# Patient Record
Sex: FEMALE | Race: WHITE | NOT HISPANIC OR LATINO
[De-identification: names, ages, dates, MRNs, and addresses within clinical notes are randomized per-mention and may not be internally consistent; named-entity substitution may affect disease eponyms.]

---

## 2018-10-17 ENCOUNTER — TRANSCRIPTION ENCOUNTER (OUTPATIENT)
Age: 63
End: 2018-10-17

## 2019-12-09 ENCOUNTER — EMERGENCY (EMERGENCY)
Facility: HOSPITAL | Age: 64
LOS: 0 days | Discharge: HOME | End: 2019-12-09
Attending: EMERGENCY MEDICINE | Admitting: EMERGENCY MEDICINE
Payer: MEDICARE

## 2019-12-09 VITALS
OXYGEN SATURATION: 98 % | HEART RATE: 96 BPM | DIASTOLIC BLOOD PRESSURE: 67 MMHG | TEMPERATURE: 99 F | SYSTOLIC BLOOD PRESSURE: 156 MMHG | RESPIRATION RATE: 16 BRPM

## 2019-12-09 DIAGNOSIS — R10.9 UNSPECIFIED ABDOMINAL PAIN: ICD-10-CM

## 2019-12-09 DIAGNOSIS — F17.200 NICOTINE DEPENDENCE, UNSPECIFIED, UNCOMPLICATED: ICD-10-CM

## 2019-12-09 DIAGNOSIS — K57.92 DIVERTICULITIS OF INTESTINE, PART UNSPECIFIED, WITHOUT PERFORATION OR ABSCESS WITHOUT BLEEDING: ICD-10-CM

## 2019-12-09 LAB
ALBUMIN SERPL ELPH-MCNC: 4.2 G/DL — SIGNIFICANT CHANGE UP (ref 3.5–5.2)
ALP SERPL-CCNC: 59 U/L — SIGNIFICANT CHANGE UP (ref 30–115)
ALT FLD-CCNC: <5 U/L — SIGNIFICANT CHANGE UP (ref 0–41)
ANION GAP SERPL CALC-SCNC: 18 MMOL/L — HIGH (ref 7–14)
AST SERPL-CCNC: 19 U/L — SIGNIFICANT CHANGE UP (ref 0–41)
BASOPHILS # BLD AUTO: 0.11 K/UL — SIGNIFICANT CHANGE UP (ref 0–0.2)
BASOPHILS NFR BLD AUTO: 0.9 % — SIGNIFICANT CHANGE UP (ref 0–1)
BILIRUB SERPL-MCNC: 0.2 MG/DL — SIGNIFICANT CHANGE UP (ref 0.2–1.2)
BUN SERPL-MCNC: 9 MG/DL — LOW (ref 10–20)
CALCIUM SERPL-MCNC: 9.6 MG/DL — SIGNIFICANT CHANGE UP (ref 8.5–10.1)
CHLORIDE SERPL-SCNC: 104 MMOL/L — SIGNIFICANT CHANGE UP (ref 98–110)
CO2 SERPL-SCNC: 21 MMOL/L — SIGNIFICANT CHANGE UP (ref 17–32)
CREAT SERPL-MCNC: 0.9 MG/DL — SIGNIFICANT CHANGE UP (ref 0.7–1.5)
EOSINOPHIL # BLD AUTO: 0.25 K/UL — SIGNIFICANT CHANGE UP (ref 0–0.7)
EOSINOPHIL NFR BLD AUTO: 1.9 % — SIGNIFICANT CHANGE UP (ref 0–8)
GLUCOSE SERPL-MCNC: 114 MG/DL — HIGH (ref 70–99)
HCT VFR BLD CALC: 39.5 % — SIGNIFICANT CHANGE UP (ref 37–47)
HGB BLD-MCNC: 13.3 G/DL — SIGNIFICANT CHANGE UP (ref 12–16)
IMM GRANULOCYTES NFR BLD AUTO: 0.4 % — HIGH (ref 0.1–0.3)
LACTATE SERPL-SCNC: 0.9 MMOL/L — SIGNIFICANT CHANGE UP (ref 0.7–2)
LIDOCAIN IGE QN: 17 U/L — SIGNIFICANT CHANGE UP (ref 7–60)
LYMPHOCYTES # BLD AUTO: 1.99 K/UL — SIGNIFICANT CHANGE UP (ref 1.2–3.4)
LYMPHOCYTES # BLD AUTO: 15.5 % — LOW (ref 20.5–51.1)
MCHC RBC-ENTMCNC: 33.7 G/DL — SIGNIFICANT CHANGE UP (ref 32–37)
MCHC RBC-ENTMCNC: 33.8 PG — HIGH (ref 27–31)
MCV RBC AUTO: 100.5 FL — HIGH (ref 81–99)
MONOCYTES # BLD AUTO: 0.81 K/UL — HIGH (ref 0.1–0.6)
MONOCYTES NFR BLD AUTO: 6.3 % — SIGNIFICANT CHANGE UP (ref 1.7–9.3)
NEUTROPHILS # BLD AUTO: 9.65 K/UL — HIGH (ref 1.4–6.5)
NEUTROPHILS NFR BLD AUTO: 75 % — SIGNIFICANT CHANGE UP (ref 42.2–75.2)
NRBC # BLD: 0 /100 WBCS — SIGNIFICANT CHANGE UP (ref 0–0)
PLATELET # BLD AUTO: 560 K/UL — HIGH (ref 130–400)
POTASSIUM SERPL-MCNC: 4.2 MMOL/L — SIGNIFICANT CHANGE UP (ref 3.5–5)
POTASSIUM SERPL-SCNC: 4.2 MMOL/L — SIGNIFICANT CHANGE UP (ref 3.5–5)
PROT SERPL-MCNC: 6.3 G/DL — SIGNIFICANT CHANGE UP (ref 6–8)
RBC # BLD: 3.93 M/UL — LOW (ref 4.2–5.4)
RBC # FLD: 12.3 % — SIGNIFICANT CHANGE UP (ref 11.5–14.5)
SODIUM SERPL-SCNC: 143 MMOL/L — SIGNIFICANT CHANGE UP (ref 135–146)
WBC # BLD: 12.86 K/UL — HIGH (ref 4.8–10.8)
WBC # FLD AUTO: 12.86 K/UL — HIGH (ref 4.8–10.8)

## 2019-12-09 PROCEDURE — 99284 EMERGENCY DEPT VISIT MOD MDM: CPT

## 2019-12-09 PROCEDURE — 74177 CT ABD & PELVIS W/CONTRAST: CPT | Mod: 26

## 2019-12-09 RX ORDER — METRONIDAZOLE 500 MG
1 TABLET ORAL
Qty: 21 | Refills: 0
Start: 2019-12-09 | End: 2019-12-15

## 2019-12-09 RX ORDER — MOXIFLOXACIN HYDROCHLORIDE TABLETS, 400 MG 400 MG/1
1 TABLET, FILM COATED ORAL
Qty: 14 | Refills: 0
Start: 2019-12-09 | End: 2019-12-15

## 2019-12-09 NOTE — ED PROVIDER NOTE - CLINICAL SUMMARY MEDICAL DECISION MAKING FREE TEXT BOX
pt presented for left lower abd pain in settting of recent tx of diverticulitis. ct done exclude perforation of abscess formation. rectal exam unremarkable as per TANYA kelly. ct showing diverticulitis. pt advised GI f/u dc home

## 2019-12-09 NOTE — ED PROVIDER NOTE - PATIENT PORTAL LINK FT
You can access the FollowMyHealth Patient Portal offered by [facility] by registering at the following website: [email or web address]. By joining [facility]’s FollowMyHealth portal, you will also be able to view your health information using other applications (apps) compatible with our system.

## 2019-12-09 NOTE — ED PROVIDER NOTE - OBJECTIVE STATEMENT
Pt is a 64 year old female with PMH Diverticulitis, presents to ED with c/o abdominal pain. Pt states pain began x2 weeks ago Pt is a 64 year old female with PMH hypothyroidism, diverticulitis, presents to ED with c/o abdominal pain. Pt states pain began x2 weeks ago. Pain is sharp located to left lower quadrant. Pain is moderate, and constant. States she [place] [facility] [place] had CT done showing diverticulitis. States she has been on cipro/flagyl for 10 days finished her last course on Sat. Last colonoscopy 3 yo ago. Last flare up 2 yo and a half ago. States she felt relief from Wed-Sat but states pain is now worsening and is now having pain with defecation. No cp, sob, fever, chills, vomiting, diarrhea, back pain, urinary symptoms, headache, dizziness, paresthesias, or weakness.

## 2019-12-09 NOTE — ED ADULT NURSE NOTE - OBJECTIVE STATEMENT
patient alert and oriented x4, complaining of abdominal pain. states she was dx with diverticulitis and finished her abx on saturday. states she still has intermittent sharp abdominal pain. denies fever, nausea, vomiting

## 2019-12-09 NOTE — ED PROVIDER NOTE - CARE PROVIDER_API CALL
Troy Gibson)  Gastroenterology  [address] [place] [address]  Phone: [phone]  Fax: [phone]  Follow Up Time: 1-3 Days

## 2019-12-09 NOTE — ED PROVIDER NOTE - PROGRESS NOTE DETAILS
Attending Note: 65 y/o F presents for evaluation of LLQ abdominal pain and pain when going to the bathroom. Pt states she went to [facility] 2 weeks ago and had CT showing mild diverticulitis. Pt took ABX but is still having pain. Pt notes she feels bloated. No urinary symptoms, back pain, fevers or chills. PE: Well appearing, awake and alert. Cardio – S1S2. Resp - CTAB. Abdomen: Mild LLQ tenderness, soft, ND. Ext- no calf swelling. Neuro – grossly unremarkable. Impression: Repeat CT to ensure no progression of disease. I was directly involved in the management of this patient. Case was discussed with PA Medardo Carey.

## 2019-12-09 NOTE — ED PROVIDER NOTE - NS ED ROS FT
Review of Systems:  	•	CONSTITUTIONAL - no fever, no diaphoresis, no chills  	•	SKIN - no rash  	•	HEMATOLOGIC - no bleeding, no bruising  	•	EYES - no eye pain, no blurry vision  	•	ENT - no congestion  	•	RESPIRATORY - no shortness of breath, no cough  	•	CARDIAC - no chest pain, no palpitations  	•	GI - +pain with defecation, + abd pain, + nausea, no vomiting, no diarrhea, no constipation  	•	GENITO-URINARY - no dysuria; no hematuria, no increased urinary frequency  	•	MUSCULOSKELETAL - no joint paint, no swelling, no redness  	•	NEUROLOGIC - no weakness, no headache, no paresthesias, no LOC  	•	PSYCH - no anxiety, no depression  	All other ROS are negative except as documented in HPI.

## 2019-12-29 ENCOUNTER — INPATIENT (INPATIENT)
Facility: HOSPITAL | Age: 64
LOS: 4 days | Discharge: HOME HEALTH | End: 2020-01-03
Attending: INTERNAL MEDICINE | Admitting: INTERNAL MEDICINE
Payer: MEDICARE

## 2019-12-29 VITALS
RESPIRATION RATE: 18 BRPM | SYSTOLIC BLOOD PRESSURE: 143 MMHG | WEIGHT: 130.07 LBS | DIASTOLIC BLOOD PRESSURE: 73 MMHG | OXYGEN SATURATION: 98 % | TEMPERATURE: 98 F | HEART RATE: 89 BPM | HEIGHT: 65 IN

## 2019-12-29 DIAGNOSIS — Z98.51 TUBAL LIGATION STATUS: Chronic | ICD-10-CM

## 2019-12-29 LAB
ALBUMIN SERPL ELPH-MCNC: 4 G/DL — SIGNIFICANT CHANGE UP (ref 3.5–5.2)
ALP SERPL-CCNC: 66 U/L — SIGNIFICANT CHANGE UP (ref 30–115)
ALT FLD-CCNC: <5 U/L — SIGNIFICANT CHANGE UP (ref 0–41)
ANION GAP SERPL CALC-SCNC: 14 MMOL/L — SIGNIFICANT CHANGE UP (ref 7–14)
APPEARANCE UR: CLEAR — SIGNIFICANT CHANGE UP
AST SERPL-CCNC: 17 U/L — SIGNIFICANT CHANGE UP (ref 0–41)
BACTERIA # UR AUTO: NEGATIVE — SIGNIFICANT CHANGE UP
BASOPHILS # BLD AUTO: 0.07 K/UL — SIGNIFICANT CHANGE UP (ref 0–0.2)
BASOPHILS NFR BLD AUTO: 0.5 % — SIGNIFICANT CHANGE UP (ref 0–1)
BILIRUB SERPL-MCNC: 0.4 MG/DL — SIGNIFICANT CHANGE UP (ref 0.2–1.2)
BILIRUB UR-MCNC: NEGATIVE — SIGNIFICANT CHANGE UP
BUN SERPL-MCNC: 10 MG/DL — SIGNIFICANT CHANGE UP (ref 10–20)
CALCIUM SERPL-MCNC: 9.7 MG/DL — SIGNIFICANT CHANGE UP (ref 8.5–10.1)
CHLORIDE SERPL-SCNC: 102 MMOL/L — SIGNIFICANT CHANGE UP (ref 98–110)
CO2 SERPL-SCNC: 23 MMOL/L — SIGNIFICANT CHANGE UP (ref 17–32)
COLOR SPEC: SIGNIFICANT CHANGE UP
CREAT SERPL-MCNC: 0.7 MG/DL — SIGNIFICANT CHANGE UP (ref 0.7–1.5)
DIFF PNL FLD: NEGATIVE — SIGNIFICANT CHANGE UP
EOSINOPHIL # BLD AUTO: 0.33 K/UL — SIGNIFICANT CHANGE UP (ref 0–0.7)
EOSINOPHIL NFR BLD AUTO: 2.4 % — SIGNIFICANT CHANGE UP (ref 0–8)
EPI CELLS # UR: 4 /HPF — SIGNIFICANT CHANGE UP (ref 0–5)
GLUCOSE SERPL-MCNC: 88 MG/DL — SIGNIFICANT CHANGE UP (ref 70–99)
GLUCOSE UR QL: NEGATIVE — SIGNIFICANT CHANGE UP
HCT VFR BLD CALC: 40.1 % — SIGNIFICANT CHANGE UP (ref 37–47)
HGB BLD-MCNC: 13.5 G/DL — SIGNIFICANT CHANGE UP (ref 12–16)
HYALINE CASTS # UR AUTO: 1 /LPF — SIGNIFICANT CHANGE UP (ref 0–7)
IMM GRANULOCYTES NFR BLD AUTO: 0.4 % — HIGH (ref 0.1–0.3)
KETONES UR-MCNC: NEGATIVE — SIGNIFICANT CHANGE UP
LACTATE SERPL-SCNC: 1.2 MMOL/L — SIGNIFICANT CHANGE UP (ref 0.7–2)
LEUKOCYTE ESTERASE UR-ACNC: ABNORMAL
LIDOCAIN IGE QN: 17 U/L — SIGNIFICANT CHANGE UP (ref 7–60)
LYMPHOCYTES # BLD AUTO: 17.6 % — LOW (ref 20.5–51.1)
LYMPHOCYTES # BLD AUTO: 2.42 K/UL — SIGNIFICANT CHANGE UP (ref 1.2–3.4)
MCHC RBC-ENTMCNC: 33.7 G/DL — SIGNIFICANT CHANGE UP (ref 32–37)
MCHC RBC-ENTMCNC: 33.7 PG — HIGH (ref 27–31)
MCV RBC AUTO: 100 FL — HIGH (ref 81–99)
MONOCYTES # BLD AUTO: 0.72 K/UL — HIGH (ref 0.1–0.6)
MONOCYTES NFR BLD AUTO: 5.2 % — SIGNIFICANT CHANGE UP (ref 1.7–9.3)
NEUTROPHILS # BLD AUTO: 10.16 K/UL — HIGH (ref 1.4–6.5)
NEUTROPHILS NFR BLD AUTO: 73.9 % — SIGNIFICANT CHANGE UP (ref 42.2–75.2)
NITRITE UR-MCNC: NEGATIVE — SIGNIFICANT CHANGE UP
NRBC # BLD: 0 /100 WBCS — SIGNIFICANT CHANGE UP (ref 0–0)
PH UR: 6.5 — SIGNIFICANT CHANGE UP (ref 5–8)
PLATELET # BLD AUTO: 345 K/UL — SIGNIFICANT CHANGE UP (ref 130–400)
POTASSIUM SERPL-MCNC: 4.7 MMOL/L — SIGNIFICANT CHANGE UP (ref 3.5–5)
POTASSIUM SERPL-SCNC: 4.7 MMOL/L — SIGNIFICANT CHANGE UP (ref 3.5–5)
PROT SERPL-MCNC: 6.5 G/DL — SIGNIFICANT CHANGE UP (ref 6–8)
PROT UR-MCNC: NEGATIVE — SIGNIFICANT CHANGE UP
RBC # BLD: 4.01 M/UL — LOW (ref 4.2–5.4)
RBC # FLD: 12.5 % — SIGNIFICANT CHANGE UP (ref 11.5–14.5)
RBC CASTS # UR COMP ASSIST: 1 /HPF — SIGNIFICANT CHANGE UP (ref 0–4)
SODIUM SERPL-SCNC: 139 MMOL/L — SIGNIFICANT CHANGE UP (ref 135–146)
SP GR SPEC: 1.01 — SIGNIFICANT CHANGE UP (ref 1.01–1.02)
UROBILINOGEN FLD QL: SIGNIFICANT CHANGE UP
WBC # BLD: 13.76 K/UL — HIGH (ref 4.8–10.8)
WBC # FLD AUTO: 13.76 K/UL — HIGH (ref 4.8–10.8)
WBC UR QL: 13 /HPF — HIGH (ref 0–5)

## 2019-12-29 PROCEDURE — 99285 EMERGENCY DEPT VISIT HI MDM: CPT

## 2019-12-29 PROCEDURE — 74177 CT ABD & PELVIS W/CONTRAST: CPT | Mod: 26

## 2019-12-29 RX ORDER — ENOXAPARIN SODIUM 100 MG/ML
40 INJECTION SUBCUTANEOUS DAILY
Refills: 0 | Status: DISCONTINUED | OUTPATIENT
Start: 2019-12-29 | End: 2020-01-03

## 2019-12-29 RX ORDER — LEVOTHYROXINE SODIUM 125 MCG
25 TABLET ORAL DAILY
Refills: 0 | Status: DISCONTINUED | OUTPATIENT
Start: 2019-12-29 | End: 2020-01-03

## 2019-12-29 RX ORDER — SODIUM CHLORIDE 9 MG/ML
1000 INJECTION, SOLUTION INTRAVENOUS
Refills: 0 | Status: DISCONTINUED | OUTPATIENT
Start: 2019-12-29 | End: 2020-01-01

## 2019-12-29 RX ORDER — PIPERACILLIN AND TAZOBACTAM 4; .5 G/20ML; G/20ML
3.38 INJECTION, POWDER, LYOPHILIZED, FOR SOLUTION INTRAVENOUS ONCE
Refills: 0 | Status: COMPLETED | OUTPATIENT
Start: 2019-12-29 | End: 2019-12-29

## 2019-12-29 RX ORDER — SODIUM CHLORIDE 9 MG/ML
1000 INJECTION, SOLUTION INTRAVENOUS ONCE
Refills: 0 | Status: COMPLETED | OUTPATIENT
Start: 2019-12-29 | End: 2019-12-29

## 2019-12-29 RX ORDER — PIPERACILLIN AND TAZOBACTAM 4; .5 G/20ML; G/20ML
3.38 INJECTION, POWDER, LYOPHILIZED, FOR SOLUTION INTRAVENOUS EVERY 8 HOURS
Refills: 0 | Status: DISCONTINUED | OUTPATIENT
Start: 2019-12-29 | End: 2020-01-02

## 2019-12-29 RX ORDER — CHLORHEXIDINE GLUCONATE 213 G/1000ML
1 SOLUTION TOPICAL
Refills: 0 | Status: DISCONTINUED | OUTPATIENT
Start: 2019-12-29 | End: 2020-01-03

## 2019-12-29 RX ADMIN — SODIUM CHLORIDE 1000 MILLILITER(S): 9 INJECTION, SOLUTION INTRAVENOUS at 16:55

## 2019-12-29 RX ADMIN — PIPERACILLIN AND TAZOBACTAM 200 GRAM(S): 4; .5 INJECTION, POWDER, LYOPHILIZED, FOR SOLUTION INTRAVENOUS at 21:10

## 2019-12-29 NOTE — H&P ADULT - ASSESSMENT
# Diverticulitis  - c/w zosyn  - IV hydration  - GI consult for Dr. Gibson  - Bowel rest for 24 hrs  - If no symptomatic improvement may need surgical eval    # Pancreatic calcifications  - patient reports these where also seen on past CT scan 10 years ago  - no evidence of acute pancreatitis    # Hypothyroidism  - c/w synthroid    #DVT ppx: lovenox  #Diet: NPO  #Activity: ambulate w/ assistance

## 2019-12-29 NOTE — ED ADULT TRIAGE NOTE - CHIEF COMPLAINT QUOTE
Pt seen in ED 12/9 weeks ago for diverticulitis. Pt followed up with GI and was switched from cipro to augmentin by GI for 10 days which completed on 12/22. Pt reports increased LLQ pain since last night with painful BM. Pt denies N/V/D.

## 2019-12-29 NOTE — H&P ADULT - NSHPPHYSICALEXAM_GEN_ALL_CORE
PHYSICAL EXAM:  GENERAL: NAD, well-developed  HEAD:  Atraumatic, Normocephalic  CHEST/LUNG: Clear to auscultation bilaterally; No wheeze, ronchi or rales  HEART: Regular rate and rhythm; No murmurs, rubs, or gallops  ABDOMEN: tender to deep palpation of left lower quadrant  EXTREMITIES:  2+ Peripheral Pulses, No clubbing, cyanosis, or edema  PSYCH: AAOx3  NEUROLOGY: non-focal  SKIN: No rashes or lesions

## 2019-12-29 NOTE — H&P ADULT - NSHPLABSRESULTS_GEN_ALL_CORE
13.5   13.76 )-----------( 345      ( 29 Dec 2019 16:45 )             40.1           139  |  102  |  10  ----------------------------<  88  4.7   |  23  |  0.7    Ca    9.7      29 Dec 2019 16:45    TPro  6.5  /  Alb  4.0  /  TBili  0.4  /  DBili  x   /  AST  17  /  ALT  <5  /  AlkPhos  66                Urinalysis Basic - ( 29 Dec 2019 16:45 )    Color: Light Yellow / Appearance: Clear / S.010 / pH: x  Gluc: x / Ketone: Negative  / Bili: Negative / Urobili: <2 mg/dL   Blood: x / Protein: Negative / Nitrite: Negative   Leuk Esterase: Moderate / RBC: 1 /HPF / WBC 13 /HPF   Sq Epi: x / Non Sq Epi: 4 /HPF / Bacteria: Negative            Lactate Trend   @ 16:45 Lactate:1.2             CAPILLARY BLOOD GLUCOSE        < from: CT Abdomen and Pelvis w/ IV Cont (19 @ 19:23) >    IMPRESSION:     Colonic diverticulosis with focal circumferential thickening of the sigmoid colon and pericolonic inflammatory changes. Findings consistent with uncomplicated diverticulitis, overall unchanged from CT abdomen and pelvis 2019.     < end of copied text >

## 2019-12-29 NOTE — H&P ADULT - HISTORY OF PRESENT ILLNESS
65 y/o F w/ PMHx of hypothyroidism and prior diverticulitis roughly 10 years ago presents for abdominal pain. History goes back to Nov 26th when patient first started to feel a left lower quadrant that gives a gassy/bloating sensation. She started oral cipro and flagyl with no improvement then subsequently went to urgent care where a CT scan shows diverticulitis and she continued with antibiotics. However there was no improvement and then patient came to [facility] ED where another CT scan showed persistent diverticulitis. Patient sent home on continued cipro and flagyl. Saw her gastroenterologist where she was then started on Augmentin on Dec 13th. Patient has continued to have to improvement in pain after finishing Augmentin. Now present to ED with persistent pain. Her pain is associated with pain with defecation. Denies any nausea, vomiting, fever, night sweats, blood per rectum.

## 2019-12-29 NOTE — ED PROVIDER NOTE - CLINICAL SUMMARY MEDICAL DECISION MAKING FREE TEXT BOX
Pt presented to ED for LLQ pain, consistent with hx of diverticulitis, that failed 3 outpt abx regiments. Pt states feels the same. Labs, advanced imaging obtained. PT with diverticulitis without perf or abscess. IV abx ordered. Will admit to inpatient management. Endorsed to medical team.

## 2019-12-29 NOTE — ED PROVIDER NOTE - OBJECTIVE STATEMENT
Pt is a 63 y/o female with hx of hypothyroidism, diverticulitis, s/p 2 rounds of abx for diverticulitis presents to ED for continual pain to LLQ for 2 weeks, worse since last night, moderate, crampy, worse with palpation. + nausea. No vomiting, diarrhea, black or bloody stool, urinary complaints, fever.

## 2019-12-29 NOTE — H&P ADULT - ATTENDING COMMENTS
65 y/o F w/ PMHx of hypothyroidism and prior diverticulitis roughly 10 years ago presents for abdominal pain. History goes back to Nov 26th when patient first started to feel a left lower quadrant that gives a gassy/bloating sensation. She started oral Cipro and flagyl with no improvement then subsequently went to urgent care where a CT scan shows diverticulitis and she continued with antibiotics. However there was no improvement and then patient came to [facility] ED where another CT scan showed persistent diverticulitis. Patient sent home on continued cipro and flagyl. Saw her gastroenterologist where she was then started on Augmentin on Dec 13th. Patient has continued to have to improvement in pain after finishing Augmentin. Now present to ED with persistent pain. Her pain is associated with pain with defecation. Denies any nausea, vomiting, fever, night sweats, blood per rectum.      REVIEW OF SYSTEMS: see cc/HPI  CONSTITUTIONAL: No weakness, fevers or chills  EYES/ENT: No visual changes;  No vertigo or throat pain   NECK: No pain or stiffness  RESPIRATORY: No cough, wheezing, hemoptysis; No shortness of breath  CARDIOVASCULAR: No chest pain or palpitations  GASTROINTESTINAL: (+) abdominal - LLQ pain. No nausea, vomiting, or hematemesis; No diarrhea or constipation. No melena or hematochezia.  GENITOURINARY: No dysuria, frequency or hematuria  NEUROLOGICAL: No numbness or weakness  SKIN: No itching, rashes    Physical Exam:  General: WN/WD NAD  Neurology: A&Ox3, nonfocal, follows commands  Eyes: PERRLA/ EOMI  ENT/Neck: Neck supple, trachea midline, No JVD  Respiratory: CTA B/L, No wheezing, rales, rhonchi  CV: Normal rate regular rhythm, S1S2, no murmurs, rubs or gallops  Abdominal: Soft, NT, ND +BS,   Extremities: No edema, + peripheral pulses  Skin: No Rashes, Hematoma, Ecchymosis  Incisions: n/a  Tubes: n/a 63 y/o F w/ PMHx of hypothyroidism and prior diverticulitis roughly 10 years ago presents for abdominal pain. History goes back to Nov 26th when patient first started to feel a left lower quadrant that gives a gassy/bloating sensation. She started oral Cipro and flagyl with no improvement then subsequently went to urgent care where a CT scan shows diverticulitis and she continued with antibiotics. However there was no improvement and then patient came to [facility] ED where another CT scan showed persistent diverticulitis. Patient sent home on continued cipro and flagyl. Saw her gastroenterologist where she was then started on Augmentin on Dec 13th. Patient has continued to have to improvement in pain after finishing Augmentin. Now present to ED with persistent pain. Her pain is associated with pain with defecation. Denies any nausea, vomiting, fever, night sweats, blood per rectum.      REVIEW OF SYSTEMS: see cc/HPI  CONSTITUTIONAL: No weakness, fevers or chills  EYES/ENT: No visual changes;  No vertigo or throat pain   NECK: No pain or stiffness  RESPIRATORY: No cough, wheezing, hemoptysis; No shortness of breath  CARDIOVASCULAR: No chest pain or palpitations  GASTROINTESTINAL: (+) abdominal - LLQ pain. No nausea, vomiting, or hematemesis; No diarrhea or constipation. No melena or hematochezia.  GENITOURINARY: No dysuria, frequency or hematuria  NEUROLOGICAL: No numbness or weakness  SKIN: No itching, rashes    Physical Exam:  General: WN/WD NAD  Neurology: A&Ox3, nonfocal, follows commands  Eyes: PERRLA/ EOMI  ENT/Neck: Neck supple, trachea midline, No JVD  Respiratory: CTA B/L, No wheezing, rales, rhonchi  CV: Normal rate regular rhythm, S1S2, no murmurs, rubs or gallops  Abdominal: Soft, (+) LLQ tenderness, ND +BS,   Extremities: No edema, + peripheral pulses  Skin: No Rashes, Hematoma, Ecchymosis  Incisions: n/a  Tubes: n/a    A/P   Acute diverticulitis w/ failure of outpatient Rx  -IV Abx   -check blood Cx and repeat WBC  -IV fluids and PPI  -NPO for now  -GI eval     Pancreatic calcification - chronic   -no intervention     Hypothyroidism  -c/w levothyroxine     DVT prophylaxis

## 2019-12-29 NOTE — H&P ADULT - NSHPSOCIALHISTORY_GEN_ALL_CORE
Active smoker since age 18 years - 1/2 PPD, also has extensive drinking history 2 drinks of wine daily - last drink one month ago - has not drank since abdominal pain started

## 2019-12-30 LAB
ALBUMIN SERPL ELPH-MCNC: 3.6 G/DL — SIGNIFICANT CHANGE UP (ref 3.5–5.2)
ALP SERPL-CCNC: 57 U/L — SIGNIFICANT CHANGE UP (ref 30–115)
ALT FLD-CCNC: <5 U/L — SIGNIFICANT CHANGE UP (ref 0–41)
ANION GAP SERPL CALC-SCNC: 12 MMOL/L — SIGNIFICANT CHANGE UP (ref 7–14)
AST SERPL-CCNC: 13 U/L — SIGNIFICANT CHANGE UP (ref 0–41)
BILIRUB SERPL-MCNC: 0.5 MG/DL — SIGNIFICANT CHANGE UP (ref 0.2–1.2)
BUN SERPL-MCNC: 8 MG/DL — LOW (ref 10–20)
CALCIUM SERPL-MCNC: 9.1 MG/DL — SIGNIFICANT CHANGE UP (ref 8.5–10.1)
CHLORIDE SERPL-SCNC: 105 MMOL/L — SIGNIFICANT CHANGE UP (ref 98–110)
CO2 SERPL-SCNC: 26 MMOL/L — SIGNIFICANT CHANGE UP (ref 17–32)
CREAT SERPL-MCNC: 0.8 MG/DL — SIGNIFICANT CHANGE UP (ref 0.7–1.5)
GLUCOSE SERPL-MCNC: 90 MG/DL — SIGNIFICANT CHANGE UP (ref 70–99)
MAGNESIUM SERPL-MCNC: 1.5 MG/DL — LOW (ref 1.8–2.4)
POTASSIUM SERPL-MCNC: 4.4 MMOL/L — SIGNIFICANT CHANGE UP (ref 3.5–5)
POTASSIUM SERPL-SCNC: 4.4 MMOL/L — SIGNIFICANT CHANGE UP (ref 3.5–5)
PROT SERPL-MCNC: 5.6 G/DL — LOW (ref 6–8)
SODIUM SERPL-SCNC: 143 MMOL/L — SIGNIFICANT CHANGE UP (ref 135–146)

## 2019-12-30 PROCEDURE — 99233 SBSQ HOSP IP/OBS HIGH 50: CPT

## 2019-12-30 RX ORDER — INFLUENZA VIRUS VACCINE 15; 15; 15; 15 UG/.5ML; UG/.5ML; UG/.5ML; UG/.5ML
0.5 SUSPENSION INTRAMUSCULAR ONCE
Refills: 0 | Status: DISCONTINUED | OUTPATIENT
Start: 2019-12-30 | End: 2020-01-03

## 2019-12-30 RX ADMIN — Medication 25 MICROGRAM(S): at 05:57

## 2019-12-30 RX ADMIN — CHLORHEXIDINE GLUCONATE 1 APPLICATION(S): 213 SOLUTION TOPICAL at 05:57

## 2019-12-30 RX ADMIN — PIPERACILLIN AND TAZOBACTAM 25 GRAM(S): 4; .5 INJECTION, POWDER, LYOPHILIZED, FOR SOLUTION INTRAVENOUS at 05:57

## 2019-12-30 RX ADMIN — PIPERACILLIN AND TAZOBACTAM 25 GRAM(S): 4; .5 INJECTION, POWDER, LYOPHILIZED, FOR SOLUTION INTRAVENOUS at 14:47

## 2019-12-30 NOTE — CONSULT NOTE ADULT - SUBJECTIVE AND OBJECTIVE BOX
Gastroenterology Consultation:    Patient is a 64y old  Female who presented with a chief complaint of persistent left lower abdominal pain    Admitted on: 12-29-19  HPI:  65 y/o F w/ PMHx of hypothyroidism and prior diverticulitis roughly 10 years ago presents for abdominal pain. History goes back to Nov 26th when patient first started to feel a left lower quadrant that gives a gassy/bloating sensation. She started oral cipro and flagyl with no improvement then subsequently went to urgent care where a CT scan shows diverticulitis and she continued with antibiotics. However there was no improvement and then patient came to [facility] ED where another CT scan showed persistent diverticulitis. Patient sent home on continued cipro and flagyl. Saw her gastroenterologist where she was then started on Augmentin on Dec 13th. Patient has continued to have to improvement in pain after finishing Augmentin. Now present to ED with persistent pain. Her pain is associated with pain with defecation. Denies any nausea, vomiting, fever, night sweats, blood per rectum. (29 Dec 2019 21:35)      Prior records Reviewed (Y/N): y  History obtained from person other than patient (Y/N): n    Prior EGD:  Prior Colonoscopy:      PAST MEDICAL & SURGICAL HISTORY:  Diverticulitis  Hypothyroidism  H/O tubal ligation      FAMILY HISTORY:  FH: myocardial infarction: mother  FH: diabetes mellitus: mother      Social History:  Tobacco:  Alcohol:  Drugs:    Home Medications:  Synthroid 25 mcg (0.025 mg) oral tablet: 1 tab(s) orally once a day (29 Dec 2019 22:46)    MEDICATIONS  (STANDING):  chlorhexidine 4% Liquid 1 Application(s) Topical <User Schedule>  enoxaparin Injectable 40 milliGRAM(s) SubCutaneous daily  lactated ringers. 1000 milliLiter(s) (75 mL/Hr) IV Continuous <Continuous>  levothyroxine 25 MICROGram(s) Oral daily  piperacillin/tazobactam IVPB.. 3.375 Gram(s) IV Intermittent every 8 hours    MEDICATIONS  (PRN):      Allergies  No Known Allergies      Review of Systems:   Constitutional:  No Fever, No Chills  ENT/Mouth:  No Hearing Changes,  No Difficulty Swallowing  Eyes:  No Eye Pain, No Vision Changes  Cardiovascular:  No Chest Pain, No Palpitations  Respiratory:  No Cough, No Dyspnea  Gastrointestinal:  As described in HPI  Musculoskeletal:  No Joint Swelling, No Back Pain  Skin:  No Skin Lesions, No Jaundice  Neuro:  No Syncope, No Dizziness  Heme/Lymph:  No Bruising, No Bleeding.          Physical Examination:  T(C): 36.4 (12-29-19 @ 23:41), Max: 36.9 (12-29-19 @ 13:58)  HR: 65 (12-29-19 @ 23:41) (65 - 89)  BP: 124/75 (12-29-19 @ 23:41) (124/75 - 143/75)  RR: 18 (12-29-19 @ 23:41) (18 - 18)  SpO2: 100% (12-29-19 @ 16:36) (98% - 100%)  Height (cm): 165.1 (12-29-19 @ 13:58)  Weight (kg): 59 (12-29-19 @ 13:58)      Constitutional: No acute distress.  Eyes:. Conjunctivae are clear, Sclera is non-icteric.  Ears Nose and Throat: The external ears are normal appearing,  Oral mucosa is pink and moist.  Respiratory:  No signs of respiratory distress. Lung sounds are clear bilaterally.  Cardiovascular:  S1 S2, Regular rate and rhythm.  GI: Abdomen is soft, symmetric, and non-tender without distention. There are no visible lesions or scars. Bowel sounds are present and normoactive in all four quadrants. No masses, hepatomegaly, or splenomegaly are noted.   Neuro: No Tremor, No involuntary movements  Skin: No rashes, No Jaundice.          Data: (reviewed by attending)                        13.5   13.76 )-----------( 345      ( 29 Dec 2019 16:45 )             40.1     Hgb Trend:  13.5  12-29-19 @ 16:45      12-29    139  |  102  |  10  ----------------------------<  88  4.7   |  23  |  0.7    Ca    9.7      29 Dec 2019 16:45    TPro  6.5  /  Alb  4.0  /  TBili  0.4  /  DBili  x   /  AST  17  /  ALT  <5  /  AlkPhos  66  12-29    Liver panel trend:  TBili 0.4   /   AST 17   /   ALT <5   /   AlkP 66   /   Tptn 6.5   /   Alb 4.0    /   DBili --      12-29              Radiology:(reviewed by attending)  CT Abdomen and Pelvis w/ IV Cont:   EXAM:  CT ABDOMEN AND PELVIS IC            PROCEDURE DATE:  12/29/2019            INTERPRETATION:  CLINICAL STATEMENT: Left lower quadrant abdominal pain.      TECHNIQUE: Contiguous axial CT images were obtained from the lower chest to the pubic symphysis following administration of 100cc Optiray 320 intravenous contrast.  Oral contrast was not administered.  Reformatted images in the coronal and sagittal planes were acquired.    COMPARISON: CT abdomen and pelvis 12/9/2019.      FINDINGS:    LOWER CHEST: Right middle lobe subsegmental atelectasis and bibasilar subsegmental atelectasis.    HEPATOBILIARY: Unremarkable.    SPLEEN: Unremarkable.    PANCREAS: Coarse calcifications of the pancreatic parenchyma with mild prominence of the pancreatic duct, consistent with sequelae of chronic pancreatitis.    ADRENAL GLANDS: Unremarkable.    KIDNEYS: Symmetric renal enhancement. No hydroureteronephrosis.    ABDOMINOPELVIC NODES: No adenopathy.    PELVIC ORGANS: Unremarkable.    PERITONEUM/MESENTERY/BOWEL: Colonic diverticulosis with focal circumferential thickening of the sigmoid colon and pericolonic inflammatory changes. No intraperitoneal free air or focal drainable collection. No bowel obstruction or ascites.    BONES/SOFT TISSUES: Multilevel degenerative changes of the spine. Mild degenerative changes of bilateral hip joints.    OTHER: Normal caliber aorta. Atherosclerotic vascular calcifications.      IMPRESSION:     Colonic diverticulosis with focal circumferential thickening of the sigmoid colon and pericolonic inflammatory changes. Findings consistent with uncomplicated diverticulitis, overall unchanged from CT abdomen and pelvis 12/9/2019.               TEENA CARROLL M.D., RESIDENT RADIOLOGIST  This document has been electronically signed.  BRADY CARNES M.D., ATTENDING RADIOLOGIST  This document has been electronically signed. Dec 29 2019  8:21PM             (12-29-19 @ 19:23)

## 2019-12-30 NOTE — PROGRESS NOTE ADULT - ASSESSMENT
65 y/o F w/ PMHx of hypothyroidism and prior diverticulitis roughly 10 years ago presents for abdominal pain. History goes back to Nov 26th when patient first started to feel a left lower quadrant that gives a gassy/bloating sensation. She started oral cipro and flagyl with no improvement then subsequently went to urgent care where a CT scan shows diverticulitis and she continued with antibiotics. However there was no improvement and then patient came to [facility] ED where another CT scan showed persistent diverticulitis. Patient sent home on continued cipro and flagyl. Saw her gastroenterologist where she was then started on Augmentin on Dec 13th. Patient has continued to have to improvement in pain after finishing Augmentin. Now present to ED with persistent pain. Her pain is associated with pain with defecation. Denies any nausea, vomiting, fever, night sweats, blood per rectum.      # Diverticulitis - failed po cipor/flagyl/augmentin   - c/w zosyn for now  - IV hydration  - GI consult for Dr. Gibson - will likely need surgical eval   - cont bowel rest for now      # Pancreatic calcifications  - patient reports these where also seen on past CT scan 10 years ago  - no evidence of acute pancreatitis    # Hypothyroidism  - c/w synthroid

## 2019-12-30 NOTE — PATIENT PROFILE ADULT - DO YOU FEEL UNSAFE AT SCHOOL?
SUBJECTIVE/OBJECTIVE:                           Murray Nicholson is a 61 year old male called for an initial visit for Medication Therapy Management.  He was referred to me from Txp team. Discharged from the [facility] for heart txp since 7/2/18.     Chief Complaint: Discuss blood sugars post txp.     Allergies/ADRs: Reviewed in Epic  Tobacco: No tobacco use  Alcohol: not currently using  Caffeine: 1 cups/day of coffee  Activity: been going out and about with his friends.   PMH: Reviewed in Epic    Medication Adherence/Access:  Patient uses pill box(es).  Patient takes medications 4 time(s) per day.   Per patient, misses medication 0 times per week.     Heart Transplant:  Current immunosuppressants include TAC 3mg mg qAM & 2.5mg qPM, Prednisone 20mg qAM & 15mg qPM and MMF 1500mg BID. Pt complains of soft/ loose stools.   Transplant date: 6/14/18  Estimated Creatinine Clearance: 13.2 mL/min (based on Cr of 6.29).  CMV prophylaxis: using Valcyte BIW Donor (+), Recipient (-), treat 6 months post tx   PCP prophylaxis: Bactrim 1/2 tab SS daily for 6 months post txp  Antifungal Prophylaxis: Nystatin until off steroids.   PPI use: Pantoprazole 40mg qAM  Current supplements for electrolyte replacement:  .  Tx Coordinator: Bob Ulloa MD: [place], Using Med Card: Yes  Recent Infections:  Concerns about a post OP infection. Pt is getting Vancomycin IV after Dialysis sessions.   Recent Hospitalizations: recent txp  Home BPs: July 3, 129/75, 95/59 PM, 7/4 129/73, 7/5 133/74, 7/6 152/81.    Diabetes:  Pt currently taking NPH  28 units qAM, 14 units qevening.  Increased NPH last night.  For adjusting your NPH use the following guidelines:    If your fasting glucose is less than 100, please reduce your night time NPH by 2-4 units    If your pre-dinner glucose is less than 100, please reduce your morning NPH by 2-4 units    If your fasting glucose is  greater than 200  for 2 or more days, please increase your night time NPH by 2-4 units  If  your pre-dinner glucose is greater than 200 for 2 or more days, please increase your morning NPH by 2-4 units Pt is not experiencing side effects.  SMBG: two times daily.   Ranges (patient reported):   Date FBG/ 2hours post Dinner /2hours post   7/6 (increased NPH) 227    7/5 244 312   7/4 195 306   7/3 152 199   7/2 Discharged from hospital.  173     Patient is not experiencing hypoglycemia  Recent symptoms of high blood sugar? none  Diet/Exercise: Spinach, potato, rotisserie chicken, Carrots dinner. Low sodium    ESRD: Dialysis Tues, Thurs, Saturday.     Thrombus formed around dialysis catheter: Pt is taking Eliquis 2.5mg BID, Aspirin 81mg daily. Denies bleeds.     GOUT: Pt has not been taking Allopurinol.     Current labs include:  Today's Vitals: There were no vitals taken for this visit.  BP Readings from Last 3 Encounters:   07/05/18 148/81   07/02/18 91/51   04/16/18 110/75     Lab Results   Component Value Date    A1C 7.0 04/26/2018   .  Lab Results   Component Value Date    CHOL 152 04/16/2018     Lab Results   Component Value Date    TRIG 233 04/16/2018     Lab Results   Component Value Date    HDL 46 04/16/2018     Lab Results   Component Value Date    LDL 60 04/16/2018       Liver Function Studies -   Recent Labs   Lab Test  06/23/18   2126   PROTTOTAL  7.0   ALBUMIN  3.1*   BILITOTAL  0.6   ALKPHOS  136   AST  13   ALT  26       Lab Results   Component Value Date    UCRR 136 05/17/2017    MICROL 1160 05/19/2015    UMALCR 1266.38 (H) 05/19/2015       Last Basic Metabolic Panel:  Lab Results   Component Value Date     07/05/2018      Lab Results   Component Value Date    POTASSIUM 5.0 07/05/2018     Lab Results   Component Value Date    CHLORIDE 101 07/05/2018     Lab Results   Component Value Date    BUN 54 07/05/2018     Lab Results   Component Value Date    CR 6.29 07/05/2018     GFR Estimate   Date Value Ref Range Status   07/05/2018 9 (L) >60 mL/min/1.7m2 Final     Comment:     Non   American GFR Calc   07/02/2018 9 (L) >60 mL/min/1.7m2 Final     Comment:     Non  GFR Calc   07/01/2018 13 (L) >60 mL/min/1.7m2 Final     Comment:     Non  GFR Calc     GFR Estimate If Black   Date Value Ref Range Status   07/05/2018 11 (L) >60 mL/min/1.7m2 Final     Comment:      GFR Calc   07/02/2018 11 (L) >60 mL/min/1.7m2 Final     Comment:      GFR Calc   07/01/2018 16 (L) >60 mL/min/1.7m2 Final     Comment:      GFR Calc     Most Recent Immunizations   Administered Date(s) Administered     HEPA 02/09/2010     HepB 04/05/2016     Influenza (H1N1) 02/09/2010     Influenza (IIV3) PF 11/26/2012     Influenza Vaccine IM 3yrs+ 4 Valent IIV4 11/07/2017     Mantoux Tuberculin Skin Test 01/23/2018     Pneumo Conj 13-V (2010&after) 09/23/2015     Pneumococcal 23 valent 02/23/2011     TD (ADULT, 7+) 08/12/2004     TDAP Vaccine (Adacel) 02/28/2013     Zoster vaccine, live 04/09/2015       ASSESSMENT:                             Current medications were reviewed today.     Medication Adherence: good, no issues identified    Heart Transplant:  Stable. TAC level out of goal of 10-15.     Diabetes:  Stable. Pt has been appropriately increasing NPH. BG not at goal. Instructed to to increased ever 2-3 days until he is between 100-200 as directed. I will follow up in several weeks.     ESRD: Max dose of Hydralazine should be 300mg daily. Will discuss with txp team. Preferred dose in renal disease is 25-50mg.  For now recommend reduction down to TID.     Thrombus formed around dialysis catheter: Stable    GOUT: Pt has not been taking Allouprinol, will follow up with txp team about this.     PLAN:                            Txp Team...  1. Pt has not been taking Allopurinol. States he is out of refills. If you would like him to continue this medication, please send over additional refills.  2. TAC level lower than goal 10-15.   3. Max dose of  Hydralazine is 300mg, recommend reducing to 100mg q8 hours max.     Pt to..  1. Continue adjusting NPH as needed.     I spent 50 minutes with this patient today. I offer these suggestions for consideration by the txp team. A copy of the visit note was provided to the patient's txp care provider.    Will follow up in 3 weeks.     The patient was sent via [employer] a summary of these recommendations as an after visit summary.     Eduardo Lea, PharmD  [facility] Pharmacist    Phone: [phone]      no

## 2019-12-30 NOTE — CONSULT NOTE ADULT - SUBJECTIVE AND OBJECTIVE BOX
Chief Complaint: Patient is a 64y old  Female who presents with a chief complaint of lower   abdo pains about 1 month ago and over the past month hqs been treted with cipr0 , Flagyl over 	    HPI:  Pt started having lower abdo- pains about 1 month ago and treated with ci[ro , flagyl andhas not had much of a response in the last 3 weeks.  Sent to E     Medications:  chlorhexidine 4% Liquid 1 Application(s) Topical <User Schedule>  enoxaparin Injectable 40 milliGRAM(s) SubCutaneous daily  lactated ringers. 1000 milliLiter(s) IV Continuous <Continuous>  levothyroxine 25 MICROGram(s) Oral daily  piperacillin/tazobactam IVPB.. 3.375 Gram(s) IV Intermittent every 8 hours      PMHX/PSHX:  Diverticulitis  Hypothyroidism  H/O tubal ligation      Family history:  FH: myocardial infarction  FH: diabetes mellitus      Social History:     Allergies:  No Known Allergies        Review of Systems:  General:  No wt loss, fevers, chills, night sweats, fatigue or pruritis.  Eyes:  Good vision, no reported pain or redness.  ENT:  No sore throat, pain, runny nose, or difficulty swallowing  CV:  No pain, palpitations, hypo/hypertension  Resp:  No dyspnea, cough, tachypnea, wheezing  GI:  No pain, nausea, vomiting, dysphagia, heartburn, diarrhea, constipation, or weight loss. , No rectal bleeding, tarry stools, or hematemesis.  :  No pain, bleeding/discharges, incontinence, nocturia  Musculoskeletal:  No pain, weakness or fasciculations.  Neuro:  No weakness, tingling, memory problems or paresthesias  Psych:  No fatigue, insomnia, mood problems, depression  Endocrine:  No polyuria, polydipsia, cold/heat intolerance  Heme:  No petechiae, ecchymosis, easy bruisability  Skin:  No rash, pruritis, tattoos, scars, or edema      PHYSICAL EXAM:   Vital Signs:  Vital Signs Last 24 Hrs  T(C): 36.4 (30 Dec 2019 08:30), Max: 36.9 (29 Dec 2019 13:58)  T(F): 97.5 (30 Dec 2019 08:30), Max: 98.4 (29 Dec 2019 13:58)  HR: 74 (30 Dec 2019 08:30) (65 - 89)  BP: 148/78 (30 Dec 2019 08:30) (124/75 - 148/78)  BP(mean): --  RR: 16 (30 Dec 2019 08:30) (16 - 18)  SpO2: 97% (30 Dec 2019 08:30) (97% - 100%)  Daily Height in cm: 165.1 (29 Dec 2019 13:58)    Daily     T(C): 36.4 (12-30-19 @ 08:30), Max: 36.9 (12-29-19 @ 13:58)  HR: 74 (12-30-19 @ 08:30) (65 - 89)  BP: 148/78 (12-30-19 @ 08:30) (124/75 - 148/78)  RR: 16 (12-30-19 @ 08:30) (16 - 18)  SpO2: 97% (12-30-19 @ 08:30) (97% - 100%)    GENERAL:  Appears stated age, well-groomed, well-nourished, no distress  HEENT:  Conjunctivae clear and pink, no thyromegaly, nodules, adenopathy, no JVD, sclera -anicteric  CHEST:  Full & symmetric excursion, no increased effort, breath sounds clear  HEART:  Regular rhythm, S1, S2, no murmur/rub/S3/S4, no abdominal bruit, no edema  ABDOMEN:  Soft, mild tenderness in yhe hypogastric arear, non-distended, normoactive bowel sounds,  no masses ,no hepato-splenomegaly, no signs of chronic liver disease  EXTEREMITIES:  no cyanosis,clubbing or edema  SKIN:  No rash/erythema/ecchymoses/petechiae/wounds/abscess/warm/dry  NEURO:  Alert, oriented, no asterixis, no tremor, no encephalopathy    LABS:                        12.3   8.42  )-----------( 344      ( 30 Dec 2019 04:30 )             36.7     12-30    143  |  105  |  8<L>  ----------------------------<  90  4.4   |  26  |  0.8    Ca    9.1      30 Dec 2019 04:30  Mg     1.5     12-30    TPro  5.6<L>  /  Alb  3.6  /  TBili  0.5  /  DBili  x   /  AST  13  /  ALT  <5  /  AlkPhos  57  12-30    LIVER FUNCTIONS - ( 30 Dec 2019 04:30 )  Alb: 3.6 g/dL / Pro: 5.6 g/dL / ALK PHOS: 57 U/L / ALT: <5 U/L / AST: 13 U/L / GGT: x               Amylase Serum--      Lipase serum17       Ammonia--    GGT--        Imaging:      Assessment and Plan:  Mild diverticulitis - not respounsive to oral   Cipro and Flagyl    Plan  1.o Acute diverticullitis  2. NPO    ID consult

## 2019-12-31 LAB
HCV AB S/CO SERPL IA: 0.15 S/CO — SIGNIFICANT CHANGE UP (ref 0–0.99)
HCV AB SERPL-IMP: SIGNIFICANT CHANGE UP

## 2019-12-31 PROCEDURE — 99233 SBSQ HOSP IP/OBS HIGH 50: CPT

## 2019-12-31 RX ORDER — ACETAMINOPHEN 500 MG
650 TABLET ORAL EVERY 6 HOURS
Refills: 0 | Status: DISCONTINUED | OUTPATIENT
Start: 2019-12-31 | End: 2020-01-03

## 2019-12-31 RX ORDER — MAGNESIUM SULFATE 500 MG/ML
2 VIAL (ML) INJECTION ONCE
Refills: 0 | Status: COMPLETED | OUTPATIENT
Start: 2019-12-31 | End: 2019-12-31

## 2019-12-31 RX ADMIN — ENOXAPARIN SODIUM 40 MILLIGRAM(S): 100 INJECTION SUBCUTANEOUS at 13:23

## 2019-12-31 RX ADMIN — PIPERACILLIN AND TAZOBACTAM 25 GRAM(S): 4; .5 INJECTION, POWDER, LYOPHILIZED, FOR SOLUTION INTRAVENOUS at 13:23

## 2019-12-31 RX ADMIN — Medication 25 MICROGRAM(S): at 06:26

## 2019-12-31 RX ADMIN — PIPERACILLIN AND TAZOBACTAM 25 GRAM(S): 4; .5 INJECTION, POWDER, LYOPHILIZED, FOR SOLUTION INTRAVENOUS at 21:18

## 2019-12-31 RX ADMIN — PIPERACILLIN AND TAZOBACTAM 25 GRAM(S): 4; .5 INJECTION, POWDER, LYOPHILIZED, FOR SOLUTION INTRAVENOUS at 00:14

## 2019-12-31 RX ADMIN — Medication 50 GRAM(S): at 10:35

## 2019-12-31 RX ADMIN — PIPERACILLIN AND TAZOBACTAM 25 GRAM(S): 4; .5 INJECTION, POWDER, LYOPHILIZED, FOR SOLUTION INTRAVENOUS at 06:25

## 2019-12-31 RX ADMIN — CHLORHEXIDINE GLUCONATE 1 APPLICATION(S): 213 SOLUTION TOPICAL at 06:25

## 2019-12-31 NOTE — CONSULT NOTE ADULT - ASSESSMENT
ASSESSMENT  63 y/o F w/ PMHx of hypothyroidism and prior diverticulitis roughly 10 years ago presents for abdominal pain. History goes back to Nov 26th when patient first started to feel a left lower quadrant that gives a gassy/bloating sensation. She started oral cipro and flagyl with no improvement then subsequently went to urgent care where a CT scan shows diverticulitis and she continued with antibiotics. However there was no improvement and then patient came to [facility] ED where another CT scan showed persistent diverticulitis. Patient sent home on continued cipro and flagyl. Saw her gastroenterologist where she was then started on Augmentin on Dec 13th. Patient has continued to have to improvement in pain after finishing Augmentin. Now present to ED with persistent pain.    IMPRESSION  #Diverticulitis, persistent after PO cipro/flagyl and augmentin courses    CTAP Colonic diverticulosis with focal circumferential thickening of the sigmoid colon and pericolonic inflammatory changes. Findings consistent with uncomplicated diverticulitis, overall unchanged from CT abdomen and pelvis 12/9/2019.   #Right middle lobe subsegmental atelectasis and bibasilar subsegmental atelectasis.  #Sepsis ruled out on admission     RECOMMENDATIONS  This is an incomplete pended note, all final recommendations to follow.   - ESR/CRP    Spectra [id number] ASSESSMENT  65 y/o F w/ PMHx of hypothyroidism and prior diverticulitis roughly 10 years ago presents for abdominal pain. History goes back to Nov 26th when patient first started to feel a left lower quadrant that gives a gassy/bloating sensation. She started oral cipro and flagyl with no improvement then subsequently went to urgent care where a CT scan shows diverticulitis and she continued with antibiotics. However there was no improvement and then patient came to [facility] ED where another CT scan showed persistent diverticulitis. Patient sent home on continued cipro and flagyl. Saw her gastroenterologist where she was then started on Augmentin on Dec 13th. Patient has continued to have to improvement in pain after finishing Augmentin. Now present to ED with persistent pain.    IMPRESSION  #Diverticulitis, persistent after PO cipro/flagyl and augmentin courses    CTAP Colonic diverticulosis with focal circumferential thickening of the sigmoid colon and pericolonic inflammatory changes. Findings consistent with uncomplicated diverticulitis, overall unchanged from CT abdomen and pelvis 12/9/2019.   #Right middle lobe subsegmental atelectasis and bibasilar subsegmental atelectasis.  #Sepsis ruled out on admission     RECOMMENDATIONS  - ertapenem 1g q24h iv via midline x 14 days (end 1/11/20)  - ESR/CRP  - GI followup   - Weekly CBC, CMP  - ID follow-up with Emeli Moser or Thurs Dr. Mohsena Amin      [address] [facility]       [phone] (call to make an appointment, walk-ins Tuesdays 10:30 AM)      Fax [phone] spectra [phone]

## 2019-12-31 NOTE — CONSULT NOTE ADULT - SUBJECTIVE AND OBJECTIVE BOX
KIM WEISS  64y, Female  Allergy: No Known Allergies      CHIEF COMPLAINT: diverticulitis resistant to antibiotic rx (31 Dec 2019 10:04)      HPI:  63 y/o F w/ PMHx of hypothyroidism and prior diverticulitis roughly 10 years ago presents for abdominal pain. History goes back to  when patient first started to feel a left lower quadrant that gives a gassy/bloating sensation. She started oral cipro and flagyl with no improvement then subsequently went to urgent care where a CT scan shows diverticulitis and she continued with antibiotics. However there was no improvement and then patient came to [facility] ED where another CT scan showed persistent diverticulitis. Patient sent home on continued cipro and flagyl. Saw her gastroenterologist where she was then started on Augmentin on Dec 13th. Patient has continued to have to improvement in pain after finishing Augmentin. Now present to ED with persistent pain. Her pain is associated with pain with defecation. Denies any nausea, vomiting, fever, night sweats, blood per rectum. (29 Dec 2019 21:35)      PAST MEDICAL & SURGICAL HISTORY:  Diverticulitis  Hypothyroidism  H/O tubal ligation      FAMILY HISTORY  FH: myocardial infarction  FH: diabetes mellitus      SOCIAL HISTORY  Pt denies any current heavy ETOH use, IVDU. No recent travel outside the [place].       ROS  General: Denies rigors, nightsweats  HEENT: Denies headache, rhinorrhea, sore throat, eye pain  CV: Denies CP, palpitations  PULM: Denies wheezing, hemoptysis  GI: Denies hematemesis, hematochezia, melena  : Denies discharge, hematuria  MSK: Denies arthralgias, myalgias  SKIN: Denies rash, lesions  NEURO: Denies paresthesias, weakness  PSYCH: Denies depression, anxiety    VITALS:  T(F): 97.4, Max: 97.6 (19 @ 18:21)  HR: 65  BP: 138/85  RR: 20Vital Signs Last 24 Hrs  T(C): 36.3 (31 Dec 2019 07:30), Max: 36.4 (30 Dec 2019 18:21)  T(F): 97.4 (31 Dec 2019 07:30), Max: 97.6 (30 Dec 2019 18:21)  HR: 65 (30 Dec 2019 23:38) (59 - 65)  BP: 138/85 (31 Dec 2019 07:30) (130/58 - 143/67)  BP(mean): --  RR: 20 (31 Dec 2019 07:30) (18 - 20)  SpO2: 97% (30 Dec 2019 15:53) (97% - 97%)    PHYSICAL EXAM:  Gen: NAD, resting in bed  HEENT: Normocephalic, atraumatic  Neck: supple, no lymphadenopathy  CV: Regular rate & regular rhythm  Lungs: decreased BS at bases, no fremitus  Abdomen: Soft, BS present  Ext: Warm, well perfused  Neuro: non focal, awake  Skin: no rash, no erythema  Lines: no phlebitis    TESTS & MEASUREMENTS:                        12.3   8.42  )-----------( 344      ( 30 Dec 2019 04:30 )             36.7         143  |  105  |  8<L>  ----------------------------<  90  4.4   |  26  |  0.8    Ca    9.1      30 Dec 2019 04:30  Mg     1.5         TPro  5.6<L>  /  Alb  3.6  /  TBili  0.5  /  DBili  x   /  AST  13  /  ALT  <5  /  AlkPhos  57        LIVER FUNCTIONS - ( 30 Dec 2019 04:30 )  Alb: 3.6 g/dL / Pro: 5.6 g/dL / ALK PHOS: 57 U/L / ALT: <5 U/L / AST: 13 U/L / GGT: x           Urinalysis Basic - ( 29 Dec 2019 16:45 )    Color: Light Yellow / Appearance: Clear / S.010 / pH: x  Gluc: x / Ketone: Negative  / Bili: Negative / Urobili: <2 mg/dL   Blood: x / Protein: Negative / Nitrite: Negative   Leuk Esterase: Moderate / RBC: 1 /HPF / WBC 13 /HPF   Sq Epi: x / Non Sq Epi: 4 /HPF / Bacteria: Negative          Lactate, Blood: 1.2 mmol/L (19 @ 16:45)      INFECTIOUS DISEASES TESTING  Hepatitis C Virus Interpretation: Nonreact (19 @ 04:30)      RADIOLOGY & ADDITIONAL TESTS:  I have personally reviewed the last Chest xray  CXR      CT  CT Abdomen and Pelvis w/ IV Cont:   EXAM:  CT ABDOMEN AND PELVIS IC            PROCEDURE DATE:  2019            INTERPRETATION:  CLINICAL STATEMENT: Left lower quadrant abdominal pain.      TECHNIQUE: Contiguous axial CT images were obtained from the lower chest to the pubic symphysis following administration of 100cc Optiray 320 intravenous contrast.  Oral contrast was not administered.  Reformatted images in the coronal and sagittal planes were acquired.    COMPARISON: CT abdomen and pelvis 2019.      FINDINGS:    LOWER CHEST: Right middle lobe subsegmental atelectasis and bibasilar subsegmental atelectasis.    HEPATOBILIARY: Unremarkable.    SPLEEN: Unremarkable.    PANCREAS: Coarse calcifications of the pancreatic parenchyma with mild prominence of the pancreatic duct, consistent with sequelae of chronic pancreatitis.    ADRENAL GLANDS: Unremarkable.    KIDNEYS: Symmetric renal enhancement. No hydroureteronephrosis.    ABDOMINOPELVIC NODES: No adenopathy.    PELVIC ORGANS: Unremarkable.    PERITONEUM/MESENTERY/BOWEL: Colonic diverticulosis with focal circumferential thickening of the sigmoid colon and pericolonic inflammatory changes. No intraperitoneal free air or focal drainable collection. No bowel obstruction or ascites.    BONES/SOFT TISSUES: Multilevel degenerative changes of the spine. Mild degenerative changes of bilateral hip joints.    OTHER: Normal caliber aorta. Atherosclerotic vascular calcifications.      IMPRESSION:     Colonic diverticulosis with focal circumferential thickening of the sigmoid colon and pericolonic inflammatory changes. Findings consistent with uncomplicated diverticulitis, overall unchanged from CT abdomen and pelvis 2019.               TEENA CARROLL M.D., RESIDENT RADIOLOGIST  This document has been electronically signed.  BRADY CARNES M.D., ATTENDING RADIOLOGIST  This document has been electronically signed. Dec 29 2019  8:21PM             (19 @ 19:23)      CARDIOLOGY TESTING      MEDICATIONS  chlorhexidine 4% Liquid 1  enoxaparin Injectable 40  influenza   Vaccine 0.5  lactated ringers. 1000  levothyroxine 25  piperacillin/tazobactam IVPB.. 3.375      ANTIBIOTICS:  piperacillin/tazobactam IVPB.. 3.375 Gram(s) IV Intermittent every 8 hours      ALLERGIES:  No Known Allergies KIM WEISS  64y, Female  Allergy: No Known Allergies      CHIEF COMPLAINT: diverticulitis resistant to antibiotic rx (31 Dec 2019 10:04)      HPI:  63 y/o F w/ PMHx of hypothyroidism and prior diverticulitis roughly 10 years ago presents for abdominal pain. History goes back to  when patient first started to feel a left lower quadrant that gives a gassy/bloating sensation. She started oral cipro and flagyl with no improvement then subsequently went to urgent care where a CT scan shows diverticulitis and she continued with antibiotics. However there was no improvement and then patient came to [facility] ED where another CT scan showed persistent diverticulitis. Patient sent home on continued cipro and flagyl. Saw her gastroenterologist where she was then started on Augmentin on Dec 13th. Patient has continued to have to improvement in pain after finishing Augmentin. Now present to ED with persistent pain. Her pain is associated with pain with defecation. Denies any nausea, vomiting, fever, night sweats, blood per rectum. (29 Dec 2019 21:35)      PAST MEDICAL & SURGICAL HISTORY:  Diverticulitis  Hypothyroidism  H/O tubal ligation      FAMILY HISTORY  FH: myocardial infarction  FH: diabetes mellitus      SOCIAL HISTORY  Pt denies any current heavy ETOH use, IVDU. No recent travel outside the [place].       ROS  General: Denies rigors, nightsweats  HEENT: Denies headache, rhinorrhea, sore throat, eye pain  CV: Denies CP, palpitations  PULM: Denies wheezing, hemoptysis  GI: Denies hematemesis, hematochezia, melena  : Denies discharge, hematuria  MSK: Denies arthralgias, myalgias  SKIN: Denies rash, lesions  NEURO: Denies paresthesias, weakness  PSYCH: Denies depression, anxiety    VITALS:  T(F): 97.4, Max: 97.6 (19 @ 18:21)  HR: 65  BP: 138/85  RR: 20Vital Signs Last 24 Hrs  T(C): 36.3 (31 Dec 2019 07:30), Max: 36.4 (30 Dec 2019 18:21)  T(F): 97.4 (31 Dec 2019 07:30), Max: 97.6 (30 Dec 2019 18:21)  HR: 65 (30 Dec 2019 23:38) (59 - 65)  BP: 138/85 (31 Dec 2019 07:30) (130/58 - 143/67)  BP(mean): --  RR: 20 (31 Dec 2019 07:30) (18 - 20)  SpO2: 97% (30 Dec 2019 15:53) (97% - 97%)    PHYSICAL EXAM:  Gen: NAD, resting in bed  HEENT: Normocephalic, atraumatic  Neck: supple, no lymphadenopathy  CV: Regular rate & regular rhythm  Lungs: decreased BS at bases, no fremitus  Abdomen: Soft, BS present LLQ TTP  Ext: Warm, well perfused  Neuro: non focal, awake  Skin: no rash, no erythema  Lines: no phlebitis    TESTS & MEASUREMENTS:                        12.3   8.42  )-----------( 344      ( 30 Dec 2019 04:30 )             36.7         143  |  105  |  8<L>  ----------------------------<  90  4.4   |  26  |  0.8    Ca    9.1      30 Dec 2019 04:30  Mg     1.5         TPro  5.6<L>  /  Alb  3.6  /  TBili  0.5  /  DBili  x   /  AST  13  /  ALT  <5  /  AlkPhos  57        LIVER FUNCTIONS - ( 30 Dec 2019 04:30 )  Alb: 3.6 g/dL / Pro: 5.6 g/dL / ALK PHOS: 57 U/L / ALT: <5 U/L / AST: 13 U/L / GGT: x           Urinalysis Basic - ( 29 Dec 2019 16:45 )    Color: Light Yellow / Appearance: Clear / S.010 / pH: x  Gluc: x / Ketone: Negative  / Bili: Negative / Urobili: <2 mg/dL   Blood: x / Protein: Negative / Nitrite: Negative   Leuk Esterase: Moderate / RBC: 1 /HPF / WBC 13 /HPF   Sq Epi: x / Non Sq Epi: 4 /HPF / Bacteria: Negative          Lactate, Blood: 1.2 mmol/L (19 @ 16:45)      INFECTIOUS DISEASES TESTING  Hepatitis C Virus Interpretation: Nonreact (19 @ 04:30)      RADIOLOGY & ADDITIONAL TESTS:  I have personally reviewed the last Chest xray  CXR      CT  CT Abdomen and Pelvis w/ IV Cont:   EXAM:  CT ABDOMEN AND PELVIS IC            PROCEDURE DATE:  2019            INTERPRETATION:  CLINICAL STATEMENT: Left lower quadrant abdominal pain.      TECHNIQUE: Contiguous axial CT images were obtained from the lower chest to the pubic symphysis following administration of 100cc Optiray 320 intravenous contrast.  Oral contrast was not administered.  Reformatted images in the coronal and sagittal planes were acquired.    COMPARISON: CT abdomen and pelvis 2019.      FINDINGS:    LOWER CHEST: Right middle lobe subsegmental atelectasis and bibasilar subsegmental atelectasis.    HEPATOBILIARY: Unremarkable.    SPLEEN: Unremarkable.    PANCREAS: Coarse calcifications of the pancreatic parenchyma with mild prominence of the pancreatic duct, consistent with sequelae of chronic pancreatitis.    ADRENAL GLANDS: Unremarkable.    KIDNEYS: Symmetric renal enhancement. No hydroureteronephrosis.    ABDOMINOPELVIC NODES: No adenopathy.    PELVIC ORGANS: Unremarkable.    PERITONEUM/MESENTERY/BOWEL: Colonic diverticulosis with focal circumferential thickening of the sigmoid colon and pericolonic inflammatory changes. No intraperitoneal free air or focal drainable collection. No bowel obstruction or ascites.    BONES/SOFT TISSUES: Multilevel degenerative changes of the spine. Mild degenerative changes of bilateral hip joints.    OTHER: Normal caliber aorta. Atherosclerotic vascular calcifications.      IMPRESSION:     Colonic diverticulosis with focal circumferential thickening of the sigmoid colon and pericolonic inflammatory changes. Findings consistent with uncomplicated diverticulitis, overall unchanged from CT abdomen and pelvis 2019.               TEENA CARROLL M.D., RESIDENT RADIOLOGIST  This document has been electronically signed.  BRADY CARNES M.D., ATTENDING RADIOLOGIST  This document has been electronically signed. Dec 29 2019  8:21PM             (19 @ 19:23)      CARDIOLOGY TESTING      MEDICATIONS  chlorhexidine 4% Liquid 1  enoxaparin Injectable 40  influenza   Vaccine 0.5  lactated ringers. 1000  levothyroxine 25  piperacillin/tazobactam IVPB.. 3.375      ANTIBIOTICS:  piperacillin/tazobactam IVPB.. 3.375 Gram(s) IV Intermittent every 8 hours      ALLERGIES:  No Known Allergies

## 2019-12-31 NOTE — PROGRESS NOTE ADULT - ASSESSMENT
63 y/o F w/ PMHx of hypothyroidism and prior diverticulitis roughly 10 years ago presents for abdominal pain. History goes back to Nov 26th when patient first started to feel a left lower quadrant that gives a gassy/bloating sensation. She started oral cipro and flagyl with no improvement then subsequently went to urgent care where a CT scan shows diverticulitis and she continued with antibiotics. However there was no improvement and then patient came to [facility] ED where another CT scan showed persistent diverticulitis. Patient sent home on continued cipro and flagyl. Saw her gastroenterologist where she was then started on Augmentin on Dec 13th. Patient has continued to have to improvement in pain after finishing Augmentin. Now present to ED with persistent pain. Her pain is associated with pain with defecation. Denies any nausea, vomiting, fever, night sweats, blood per rectum.  FEELING BETTER TODAY     # Diverticulitis - failed po cipor/flagyl/augmentin   - c/w zosyn for now  - IV hydration  - GI consult recc reviewed    - clears for now   - ID cx     # hypomagnesemia  - replacing via IV 2 g       # Pancreatic calcifications  - patient reports these where also seen on past CT scan 10 years ago  - no evidence of acute pancreatitis    # Hypothyroidism  - c/w synthroid    D/W DR. CHERY

## 2019-12-31 NOTE — PROGRESS NOTE ADULT - ASSESSMENT
IMP:  Diverticulitis seems to be improving    PLAN:  start clear fluids and increase slowly as toleerated

## 2020-01-01 PROCEDURE — 99233 SBSQ HOSP IP/OBS HIGH 50: CPT

## 2020-01-01 RX ADMIN — PIPERACILLIN AND TAZOBACTAM 25 GRAM(S): 4; .5 INJECTION, POWDER, LYOPHILIZED, FOR SOLUTION INTRAVENOUS at 05:38

## 2020-01-01 RX ADMIN — PIPERACILLIN AND TAZOBACTAM 25 GRAM(S): 4; .5 INJECTION, POWDER, LYOPHILIZED, FOR SOLUTION INTRAVENOUS at 21:30

## 2020-01-01 RX ADMIN — PIPERACILLIN AND TAZOBACTAM 25 GRAM(S): 4; .5 INJECTION, POWDER, LYOPHILIZED, FOR SOLUTION INTRAVENOUS at 13:16

## 2020-01-01 RX ADMIN — Medication 25 MICROGRAM(S): at 05:38

## 2020-01-01 NOTE — PROGRESS NOTE ADULT - ASSESSMENT
63 y/o F w/ PMHx of hypothyroidism and prior diverticulitis roughly 10 years ago presents for abdominal pain. History goes back to Nov 26th when patient first started to feel a left lower quadrant that gives a gassy/bloating sensation. She started oral cipro and flagyl with no improvement then subsequently went to urgent care where a CT scan shows diverticulitis and she continued with antibiotics. However there was no improvement and then patient came to [facility] ED where another CT scan showed persistent diverticulitis. Patient sent home on continued cipro and flagyl. Saw her gastroenterologist where she was then started on Augmentin on Dec 13th. Patient has continued to have to improvement in pain after finishing Augmentin. Now present to ED with persistent pain. Her pain is associated with pain with defecation. Denies any nausea, vomiting, fever, night sweats, blood per rectum.      # Diverticulitis - failed po cipor/flagyl/augmentin   - awaiting surgery eval   -midline to be placed in am and ertapenum upon dc  -gi follow up   -op id follow up  - advance diet to gi soft     # Pancreatic calcifications    # Hypothyroidism  - c/w synthroid    #Tobacco abuse counseled     #Atelectasis deep breathing     #CKD 2     #H/o 3mm left lower lobe granule    Progress Note Handoff    Pending:  anticipate dc in am     Family discussion: patient is of sound mind and agrees to plan of care    Disposition: Home___

## 2020-01-02 ENCOUNTER — TRANSCRIPTION ENCOUNTER (OUTPATIENT)
Age: 65
End: 2020-01-02

## 2020-01-02 PROCEDURE — 99239 HOSP IP/OBS DSCHRG MGMT >30: CPT

## 2020-01-02 PROCEDURE — 99221 1ST HOSP IP/OBS SF/LOW 40: CPT

## 2020-01-02 RX ORDER — ERTAPENEM SODIUM 1 G/1
1000 INJECTION, POWDER, LYOPHILIZED, FOR SOLUTION INTRAMUSCULAR; INTRAVENOUS EVERY 24 HOURS
Refills: 0 | Status: DISCONTINUED | OUTPATIENT
Start: 2020-01-02 | End: 2020-01-03

## 2020-01-02 RX ORDER — PIPERACILLIN AND TAZOBACTAM 4; .5 G/20ML; G/20ML
1 INJECTION, POWDER, LYOPHILIZED, FOR SOLUTION INTRAVENOUS
Qty: 0 | Refills: 0 | DISCHARGE
Start: 2020-01-02

## 2020-01-02 RX ORDER — ERTAPENEM SODIUM 1 G/1
1 INJECTION, POWDER, LYOPHILIZED, FOR SOLUTION INTRAMUSCULAR; INTRAVENOUS
Qty: 0 | Refills: 0 | DISCHARGE
Start: 2020-01-02

## 2020-01-02 RX ADMIN — ERTAPENEM SODIUM 120 MILLIGRAM(S): 1 INJECTION, POWDER, LYOPHILIZED, FOR SOLUTION INTRAMUSCULAR; INTRAVENOUS at 16:31

## 2020-01-02 RX ADMIN — Medication 25 MICROGRAM(S): at 06:20

## 2020-01-02 RX ADMIN — PIPERACILLIN AND TAZOBACTAM 25 GRAM(S): 4; .5 INJECTION, POWDER, LYOPHILIZED, FOR SOLUTION INTRAVENOUS at 13:36

## 2020-01-02 RX ADMIN — PIPERACILLIN AND TAZOBACTAM 25 GRAM(S): 4; .5 INJECTION, POWDER, LYOPHILIZED, FOR SOLUTION INTRAVENOUS at 06:18

## 2020-01-02 NOTE — DISCHARGE NOTE NURSING/CASE MANAGEMENT/SOCIAL WORK - NSDCFUADDAPPT_GEN_ALL_CORE_FT
ID follow-up with Emeli Moser or Thurs Dr. Mohsena Amin      [address] [facility]       [phone] (call to make an appointment, walk-ins Tuesdays 10:30 AM)      Fax [phone]

## 2020-01-02 NOTE — CONSULT NOTE ADULT - SUBJECTIVE AND OBJECTIVE BOX
KIM WEISS [id number]  64y Female    HPI:  63 y/o F w/ PMHx of hypothyroidism and prior diverticulitis roughly 10 years ago presents for abdominal pain. History goes back to Nov 26th when patient first started to feel a left lower quadrant that gives a gassy/bloating sensation. She started oral cipro and flagyl with no improvement then subsequently went to urgent care where a CT scan shows diverticulitis and she continued with antibiotics. However there was no improvement and then patient came to [facility] ED where another CT scan showed persistent diverticulitis. Patient sent home on continued cipro and flagyl. Saw her gastroenterologist where she was then started on Augmentin on Dec 13th. Patient has continued to have to improvement in pain after finishing Augmentin. Now present to ED with persistent pain. Her pain is associated with pain with defecation. Denies any nausea, vomiting, fever, night sweats, blood per rectum. (29 Dec 2019 21:35)    Surgery consulted to discuss possible surgical intervention. Per the patient she first started feeling LLQ pain on 11/25, started herself on a 10d course of oral cipro/flagyl given to her by her physician during a previous encounter. She completed her 10d course, however d/t persistent pain, presented to the ED for evaluation on 12/9, was scanned with CT AP findings of focal area of sigmoid wall thickening with adjacent inflammatory fat stranding  consistent with diverticulitis, and was discharged on 10d cipro/flagyl. She went to her GI doctor Prince for evaluation on 12/13 and was started on 10d of augmentin. D/t persistent pain, she then re-presented to the ED for evaluation w/ CT AP findings of colonic diverticulosis with focal circumferential thickening of the sigmoid colon and pericolonic inflammatory changes, globally unchanged from previous CT AP. She was admitted to Medicine for IV zosyn, with ID recommendation for IV ertapenem for 10d        PAST MEDICAL & SURGICAL HISTORY:  Diverticulitis  Hypothyroidism  H/O tubal ligation        MEDICATIONS  (STANDING):  chlorhexidine 4% Liquid 1 Application(s) Topical <User Schedule>  enoxaparin Injectable 40 milliGRAM(s) SubCutaneous daily  influenza   Vaccine 0.5 milliLiter(s) IntraMuscular once  levothyroxine 25 MICROGram(s) Oral daily  piperacillin/tazobactam IVPB.. 3.375 Gram(s) IV Intermittent every 8 hours    MEDICATIONS  (PRN):  acetaminophen   Tablet .. 650 milliGRAM(s) Oral every 6 hours PRN Temp greater or equal to 38C (100.4F), Mild Pain (1 - 3)      Allergies    No Known Allergies    Intolerances        REVIEW OF SYSTEMS    [ ] A ten-point review of systems was otherwise negative except as noted.  [ ] Due to altered mental status/intubation, subjective information were not able to be obtained from the patient. History was obtained, to the extent possible, from review of the chart and collateral sources of information.      Vital Signs Last 24 Hrs  T(C): 36.5 (02 Jan 2020 07:30), Max: 36.5 (02 Jan 2020 07:30)  T(F): 97.7 (02 Jan 2020 07:30), Max: 97.7 (02 Jan 2020 07:30)  HR: 58 (02 Jan 2020 07:30) (53 - 58)  BP: 129/65 (02 Jan 2020 07:30) (129/65 - 160/56)  BP(mean): --  RR: 17 (02 Jan 2020 07:30) (17 - 18)  SpO2: --    PHYSICAL EXAM:  GENERAL: NAD, well-appearing  CHEST/LUNG: Clear to auscultation bilaterally  HEART: Regular rate and rhythm  ABDOMEN: Soft, Nontender, Nondistended;   EXTREMITIES:  No clubbing, cyanosis, or edema      LABS:  Labs:  CAPILLARY BLOOD GLUCOSE                      LFTs:         Coags:                    RADIOLOGY & ADDITIONAL STUDIES: KIM WEISS [id number]  64y Female    HPI:  63 y/o F w/ PMHx of hypothyroidism and prior diverticulitis roughly 10 years ago presents for abdominal pain. History goes back to Nov 26th when patient first started to feel a left lower quadrant that gives a gassy/bloating sensation. She started oral cipro and flagyl with no improvement then subsequently went to urgent care where a CT scan shows diverticulitis and she continued with antibiotics. However there was no improvement and then patient came to [facility] ED where another CT scan showed persistent diverticulitis. Patient sent home on continued cipro and flagyl. Saw her gastroenterologist where she was then started on Augmentin on Dec 13th. Patient has continued to have to improvement in pain after finishing Augmentin. Now present to ED with persistent pain. Her pain is associated with pain with defecation. Denies any nausea, vomiting, fever, night sweats, blood per rectum. (29 Dec 2019 21:35)    Surgery consulted to discuss possible surgical intervention. Per the patient she first started feeling LLQ pain on 11/25, started herself on a 10d course of oral cipro/flagyl given to her by her physician during a previous encounter. She completed her 10d course, however d/t persistent pain, presented to the ED for evaluation on 12/9, was scanned with CT AP findings of focal area of sigmoid wall thickening with adjacent inflammatory fat stranding  consistent with diverticulitis, and was discharged on 10d cipro/flagyl. She went to her GI doctor Prince for evaluation on 12/13 and was started on 10d of augmentin. D/t persistent pain, she then re-presented to the ED for evaluation w/ CT AP findings of colonic diverticulosis with focal circumferential thickening of the sigmoid colon and pericolonic inflammatory changes, globally unchanged from previous CT AP. She was admitted to Medicine for IV zosyn, with ID recommendation for IV ertapenem for 10d with plan for discharge today.    PAST MEDICAL & SURGICAL HISTORY:  Diverticulitis  Hypothyroidism  H/O tubal ligation        MEDICATIONS  (STANDING):  chlorhexidine 4% Liquid 1 Application(s) Topical <User Schedule>  enoxaparin Injectable 40 milliGRAM(s) SubCutaneous daily  influenza   Vaccine 0.5 milliLiter(s) IntraMuscular once  levothyroxine 25 MICROGram(s) Oral daily  piperacillin/tazobactam IVPB.. 3.375 Gram(s) IV Intermittent every 8 hours    MEDICATIONS  (PRN):  acetaminophen   Tablet .. 650 milliGRAM(s) Oral every 6 hours PRN Temp greater or equal to 38C (100.4F), Mild Pain (1 - 3)      Allergies    No Known Allergies    Intolerances        REVIEW OF SYSTEMS    [ x] A ten-point review of systems was otherwise negative except as noted.  [ ] Due to altered mental status/intubation, subjective information were not able to be obtained from the patient. History was obtained, to the extent possible, from review of the chart and collateral sources of information.      Vital Signs Last 24 Hrs  T(C): 36.5 (02 Jan 2020 07:30), Max: 36.5 (02 Jan 2020 07:30)  T(F): 97.7 (02 Jan 2020 07:30), Max: 97.7 (02 Jan 2020 07:30)  HR: 58 (02 Jan 2020 07:30) (53 - 58)  BP: 129/65 (02 Jan 2020 07:30) (129/65 - 160/56)  BP(mean): --  RR: 17 (02 Jan 2020 07:30) (17 - 18)  SpO2: --    PHYSICAL EXAM:  GENERAL: NAD, well-appearing  CHEST/LUNG: Clear to auscultation bilaterally  HEART: Regular rate and rhythm  ABDOMEN: Soft, Nontender, Nondistended;

## 2020-01-02 NOTE — CONSULT NOTE ADULT - ASSESSMENT
63 y/o F w/ PMHx of hypothyroidism and prior diverticulitis roughly 10 years ago presenting with approximately 1 mo of diverticulitis with plan for discharge on 10d of IV ertapenem    Plan:  - recommend outpatient colonoscopy 6 weeks from admission  - please set up follow up appointment with Dr. Collins in the next 2-3 weeks  - d/w Dr. Collins  - please recall as needed

## 2020-01-02 NOTE — DISCHARGE NOTE PROVIDER - PROVIDER TOKENS
PROVIDER:[TOKEN:[[id number]]],PROVIDER:[TOKEN:[[id number]]],PROVIDER:[TOKEN:[[id number]]] PROVIDER:[TOKEN:[[id number]]],PROVIDER:[TOKEN:[[id number]]],PROVIDER:[TOKEN:[[id number]]],PROVIDER:[TOKEN:[[id number]]]

## 2020-01-02 NOTE — DISCHARGE NOTE PROVIDER - CARE PROVIDERS DIRECT ADDRESSES
,[email or web address] [email or web address],DirectAddress_Unknown,DirectAddress_Unknown

## 2020-01-02 NOTE — PROGRESS NOTE ADULT - ASSESSMENT
IMP:  Resolving diverticulitris    PLAN:  Pt to go home today on soft diet.  PICC line and continue antibiortics for another 2 weeks.  F/u office visit next week.   Colonoscopy  in 6-8 weeks.  Final decision re surgical resection afgter the colonoscopy.

## 2020-01-02 NOTE — PROCEDURE NOTE - NSPROCDETAILS_GEN_ALL_CORE
sterile dressing applied/supine position/location identified, draped/prepped, sterile technique used/ultrasound guidance

## 2020-01-02 NOTE — DISCHARGE NOTE PROVIDER - CARE PROVIDER_API CALL
Natanael Darnell)  Internal Medicine  [address] [place] [address]  Phone: [phone]  Fax: [phone]  Follow Up Time:     Lpuillo Collins)  Surgery  [address] [place] [address], [address] [place] [address]  Phone: [phone]  Fax: [phone]  Follow Up Time:     Roland Aranda)  Gastroenterology; Internal Medicine  [address] [place] [address]  Phone: [phone]  Fax: [phone]  Follow Up Time: Natanael Darnell)  Internal Medicine  [address] [place] [address]  Phone: [phone]  Fax: [phone]  Follow Up Time:     Lupillo Collins)  Surgery  [address] [place] [address], [address]rd [place] [address]  Phone: [phone]  Fax: [phone]  Follow Up Time:     Roland Aranda)  Gastroenterology; Internal Medicine  [address] [place] [address]  Phone: [phone]  Fax: [phone]  Follow Up Time:     Josias Moser)  Infectious Disease; Internal Medicine  [address] [place] [address]  Phone: [phone]  Fax: [phone]  Follow Up Time:

## 2020-01-02 NOTE — PROGRESS NOTE ADULT - ASSESSMENT
65 y/o F w/ PMHx of hypothyroidism and prior diverticulitis roughly 10 years ago presents for abdominal pain. History goes back to Nov 26th when patient first started to feel a left lower quadrant that gives a gassy/bloating sensation. She started oral cipro and flagyl with no improvement then subsequently went to urgent care where a CT scan shows diverticulitis and she continued with antibiotics. However there was no improvement and then patient came to [facility] ED where another CT scan showed persistent diverticulitis. Patient sent home on continued cipro and flagyl. Saw her gastroenterologist where she was then started on Augmentin on Dec 13th. Patient has continued to have to improvement in pain after finishing Augmentin. Now present to ED with persistent pain. Her pain is associated with pain with defecation. Denies any nausea, vomiting, fever, night sweats, blood per rectum.      # Diverticulitis - failed po cipro/flagyl/augmentin   - awaiting surgery eval   - midline to be placed in am and Ertapenem 1g q24 upon dc x 14 days till 1/11/20  - f/u CBC, ESR and weekly CBC/CMP  - gi follow up   - op id follow up  - advance diet to gi soft     # Pancreatic calcifications  - patient reports these where also seen on past CT scan 10 years ago  - no evidence of acute pancreatitis    # Hypothyroidism  - c/w synthroid    #Atelectasis deep breathing   #CKD 2   #H/o 3mm left lower lobe granule    #Diet: soft: fiber restricted  #GIppx  #DVTppx: Lovenox  #Dispo: Home w/ [facility]  #Fullcode 65 y/o F w/ PMHx of hypothyroidism and prior diverticulitis roughly 10 years ago presents for abdominal pain. History goes back to Nov 26th when patient first started to feel a left lower quadrant that gives a gassy/bloating sensation. She started oral cipro and flagyl with no improvement then subsequently went to urgent care where a CT scan shows diverticulitis and she continued with antibiotics. However there was no improvement and then patient came to [facility] ED where another CT scan showed persistent diverticulitis. Patient sent home on continued cipro and flagyl. Saw her gastroenterologist where she was then started on Augmentin on Dec 13th. Patient has continued to have to improvement in pain after finishing Augmentin. Now present to ED with persistent pain. Her pain is associated with pain with defecation. Denies any nausea, vomiting, fever, night sweats, blood per rectum.      # Diverticulitis - failed po cipro/flagyl/augmentin   - awaiting surgery eval   - midline to be placed in am and Ertapenem 1g q24 upon dc x 14 days till 1/11/20  - f/u CBC, ESR and weekly CBC/CMP  - f/u ID, GI and surgery as out patient  - advance diet to gi soft     # Pancreatic calcifications  - patient reports these where also seen on past CT scan 10 years ago  - no evidence of acute pancreatitis    # Hypothyroidism  - c/w synthroid    #Atelectasis deep breathing   #CKD 2   #H/o 3mm left lower lobe granule    #Diet: soft: fiber restricted  #GIppx  #DVTppx: Lovenox  #Dispo: Home w/ [facility]  #Fullcode

## 2020-01-02 NOTE — PROGRESS NOTE ADULT - PROVIDER SPECIALTY LIST ADULT
Internal Medicine My chart message sent that lab work fine except some elevated cholesterol. Lifestyle changes recommended.

## 2020-01-02 NOTE — DISCHARGE NOTE PROVIDER - NSDCFUSCHEDAPPT_GEN_ALL_CORE_FT
KIM WEISS ; 03/02/2020 ; [facility] [id number] KIM WEISS ; 03/02/2020 ; [facility] [id number] KIM WEISS ; 03/02/2020 ; [facility] [id number]

## 2020-01-02 NOTE — DISCHARGE NOTE PROVIDER - NSDCMRMEDTOKEN_GEN_ALL_CORE_FT
piperacillin-tazobactam: 1 gram(s) intravenous once a day for 14 days total till 1/11/20  Synthroid 25 mcg (0.025 mg) oral tablet: 1 tab(s) orally once a day ertapenem 1 g injection: 1 gram(s) injectable once a day till 1/11/20  Synthroid 25 mcg (0.025 mg) oral tablet: 1 tab(s) orally once a day

## 2020-01-02 NOTE — DISCHARGE NOTE PROVIDER - HOSPITAL COURSE
65 y/o F w/ PMHx of hypothyroidism and prior diverticulitis roughly 10 years ago presents for abdominal pain. History goes back to Nov 26th when patient first started to feel a left lower quadrant that gives a gassy/bloating sensation. She started oral cipro and flagyl with no improvement then subsequently went to urgent care where a CT scan shows diverticulitis and she continued with antibiotics. However there was no improvement and then patient came to [facility] ED where another CT scan showed persistent diverticulitis. Patient sent home on continued cipro and flagyl. Saw her gastroenterologist where she was then started on Augmentin on Dec 13th. Patient has continued to have to improvement in pain after finishing Augmentin. Now present to ED with persistent pain. Her pain is associated with pain with defecation. Denies any nausea, vomiting, fever, night sweats, blood per rectum.    Pt was followed up by id, started on ertapenem, will go home on abx via midline. Will f/u with ID as out patient and surgery for colonscopy after 6 weeks.

## 2020-01-02 NOTE — CONSULT NOTE ADULT - ATTENDING COMMENTS
REINA Aranda
this patient has uncomplicated diverticulitis with initial treatment failure, likely related to inadequate abx coverage.  She can see me or another surgeon as outpatient to discuss surveillance and surgical options.

## 2020-01-02 NOTE — DISCHARGE NOTE PROVIDER - NSDCCPCAREPLAN_GEN_ALL_CORE_FT
PRINCIPAL DISCHARGE DIAGNOSIS  Diagnosis: Diverticulitis  Assessment and Plan of Treatment: You came in becasue of abdominal pain due to recurrent diverticulitis. We did imaging studies and schanged your previosu antibiotics to new antyibiotics. Pleasebe compliant with your medications and follow up with your PMD and GI as out patient.

## 2020-01-02 NOTE — DISCHARGE NOTE PROVIDER - NSDCFUADDAPPT_GEN_ALL_CORE_FT
ID follow-up with Emeli Moser or Thurs Dr. Mohsena Amin      [address] [facility]       [phone] (call to make an appointment, walk-ins Tuesdays 10:30 AM)      Fax [phone]

## 2020-01-03 VITALS
DIASTOLIC BLOOD PRESSURE: 69 MMHG | SYSTOLIC BLOOD PRESSURE: 141 MMHG | RESPIRATION RATE: 18 BRPM | TEMPERATURE: 98 F | HEART RATE: 73 BPM

## 2020-01-03 LAB
ALBUMIN SERPL ELPH-MCNC: 4.1 G/DL — SIGNIFICANT CHANGE UP (ref 3.5–5.2)
ALP SERPL-CCNC: 60 U/L — SIGNIFICANT CHANGE UP (ref 30–115)
ALT FLD-CCNC: 12 U/L — SIGNIFICANT CHANGE UP (ref 0–41)
ANION GAP SERPL CALC-SCNC: 13 MMOL/L — SIGNIFICANT CHANGE UP (ref 7–14)
AST SERPL-CCNC: 29 U/L — SIGNIFICANT CHANGE UP (ref 0–41)
BASOPHILS # BLD AUTO: 0.07 K/UL — SIGNIFICANT CHANGE UP (ref 0–0.2)
BASOPHILS NFR BLD AUTO: 0.8 % — SIGNIFICANT CHANGE UP (ref 0–1)
BILIRUB SERPL-MCNC: 0.3 MG/DL — SIGNIFICANT CHANGE UP (ref 0.2–1.2)
BUN SERPL-MCNC: 11 MG/DL — SIGNIFICANT CHANGE UP (ref 10–20)
CALCIUM SERPL-MCNC: 9.9 MG/DL — SIGNIFICANT CHANGE UP (ref 8.5–10.1)
CHLORIDE SERPL-SCNC: 102 MMOL/L — SIGNIFICANT CHANGE UP (ref 98–110)
CO2 SERPL-SCNC: 26 MMOL/L — SIGNIFICANT CHANGE UP (ref 17–32)
CREAT SERPL-MCNC: 0.8 MG/DL — SIGNIFICANT CHANGE UP (ref 0.7–1.5)
EOSINOPHIL # BLD AUTO: 0.83 K/UL — HIGH (ref 0–0.7)
EOSINOPHIL NFR BLD AUTO: 9.6 % — HIGH (ref 0–8)
GLUCOSE SERPL-MCNC: 96 MG/DL — SIGNIFICANT CHANGE UP (ref 70–99)
HCT VFR BLD CALC: 43.4 % — SIGNIFICANT CHANGE UP (ref 37–47)
HGB BLD-MCNC: 15 G/DL — SIGNIFICANT CHANGE UP (ref 12–16)
IMM GRANULOCYTES NFR BLD AUTO: 0.2 % — SIGNIFICANT CHANGE UP (ref 0.1–0.3)
LYMPHOCYTES # BLD AUTO: 2.82 K/UL — SIGNIFICANT CHANGE UP (ref 1.2–3.4)
LYMPHOCYTES # BLD AUTO: 32.6 % — SIGNIFICANT CHANGE UP (ref 20.5–51.1)
MCHC RBC-ENTMCNC: 33.8 PG — HIGH (ref 27–31)
MCHC RBC-ENTMCNC: 34.6 G/DL — SIGNIFICANT CHANGE UP (ref 32–37)
MCV RBC AUTO: 97.7 FL — SIGNIFICANT CHANGE UP (ref 81–99)
MONOCYTES # BLD AUTO: 0.65 K/UL — HIGH (ref 0.1–0.6)
MONOCYTES NFR BLD AUTO: 7.5 % — SIGNIFICANT CHANGE UP (ref 1.7–9.3)
NEUTROPHILS # BLD AUTO: 4.26 K/UL — SIGNIFICANT CHANGE UP (ref 1.4–6.5)
NEUTROPHILS NFR BLD AUTO: 49.3 % — SIGNIFICANT CHANGE UP (ref 42.2–75.2)
NRBC # BLD: 0 /100 WBCS — SIGNIFICANT CHANGE UP (ref 0–0)
PLATELET # BLD AUTO: 403 K/UL — HIGH (ref 130–400)
POTASSIUM SERPL-MCNC: 4.3 MMOL/L — SIGNIFICANT CHANGE UP (ref 3.5–5)
POTASSIUM SERPL-SCNC: 4.3 MMOL/L — SIGNIFICANT CHANGE UP (ref 3.5–5)
PROT SERPL-MCNC: 6.5 G/DL — SIGNIFICANT CHANGE UP (ref 6–8)
RBC # BLD: 4.44 M/UL — SIGNIFICANT CHANGE UP (ref 4.2–5.4)
RBC # FLD: 12.1 % — SIGNIFICANT CHANGE UP (ref 11.5–14.5)
SODIUM SERPL-SCNC: 141 MMOL/L — SIGNIFICANT CHANGE UP (ref 135–146)
WBC # BLD: 8.65 K/UL — SIGNIFICANT CHANGE UP (ref 4.8–10.8)
WBC # FLD AUTO: 8.65 K/UL — SIGNIFICANT CHANGE UP (ref 4.8–10.8)

## 2020-01-03 PROCEDURE — 99232 SBSQ HOSP IP/OBS MODERATE 35: CPT

## 2020-01-03 RX ADMIN — CHLORHEXIDINE GLUCONATE 1 APPLICATION(S): 213 SOLUTION TOPICAL at 06:33

## 2020-01-03 RX ADMIN — ERTAPENEM SODIUM 120 MILLIGRAM(S): 1 INJECTION, POWDER, LYOPHILIZED, FOR SOLUTION INTRAMUSCULAR; INTRAVENOUS at 12:17

## 2020-01-03 RX ADMIN — Medication 25 MICROGRAM(S): at 06:35

## 2020-01-03 NOTE — PROGRESS NOTE ADULT - ASSESSMENT
65 y/o F w/ PMHx of hypothyroidism and prior diverticulitis roughly 10 years ago presents for abdominal pain. History goes back to Nov 26th when patient first started to feel a left lower quadrant that gives a gassy/bloating sensation. She started oral cipro and flagyl with no improvement then subsequently went to urgent care where a CT scan shows diverticulitis and she continued with antibiotics. However there was no improvement and then patient came to [facility] ED where another CT scan showed persistent diverticulitis. Patient sent home on continued cipro and flagyl. Saw her gastroenterologist where she was then started on Augmentin on Dec 13th. Patient has continued to have to improvement in pain after finishing Augmentin. Now present to ED with persistent pain. Her pain is associated with pain with defecation. Denies any nausea, vomiting, fever, night sweats, blood per rectum.      # Diverticulitis - failed po cipor/flagyl/augmentin   dc home with iv ertapenum   patient has been cleared but insurance approval pending    # Pancreatic calcifications    # Hypothyroidism  - c/w synthroid    #Tobacco abuse counseled     #Atelectasis deep breathing     #CKD 2     #H/o 3mm left lower lobe granule    Progress Note Handoff    Pending:  insurance auth    Family discussion: patient is of sound mind and agrees to plan of care    Disposition: Home___

## 2020-01-03 NOTE — CHART NOTE - NSCHARTNOTEFT_GEN_A_CORE
<<<RESIDENT DISCHARGE NOTE>>>     KIM WEISS  MRN-[id number]    VITAL SIGNS:  T(F): 97.9 (01-03-20 @ 07:38), Max: 97.9 (01-03-20 @ 07:38)  HR: 73 (01-03-20 @ 07:38)  BP: 141/69 (01-03-20 @ 07:38)  SpO2: --      PHYSICAL EXAMINATION:  General:   Head & Neck:  Pulmonary:clear  Cardiovascular:[id number]  Gastrointestinal/Abdomen & Pelvis: non tender, BS+  Neurologic/Motor: non focal    TEST RESULTS:                        15.0   8.65  )-----------( 403      ( 03 Jan 2020 04:30 )             43.4       01-03    141  |  102  |  11  ----------------------------<  96  4.3   |  26  |  0.8    Ca    9.9      03 Jan 2020 04:30    TPro  6.5  /  Alb  4.1  /  TBili  0.3  /  DBili  x   /  AST  29  /  ALT  12  /  AlkPhos  60  01-03      FINAL DISCHARGE INTERVIEW:  Resident(s) Present: (Name:_Celso___________)    DISCHARGE MEDICATION RECONCILIATION  reviewed with Attending (Name:charissa    DISPOSITION:   [  ] Home,    [ * ] Home with Visiting Nursing Services,   [    ]  SNF/ NH,    [   ] Acute Rehab (4A),   [   ] Other (Specify:_________)

## 2020-01-03 NOTE — PROGRESS NOTE ADULT - SUBJECTIVE AND OBJECTIVE BOX
Chief Complaint: Patient is a 64y old  Female who presents with a chief complaint of LLQ pain (31 Dec 2019 09:02)      HPI:    Medications:  chlorhexidine 4% Liquid 1 Application(s) Topical <User Schedule>  enoxaparin Injectable 40 milliGRAM(s) SubCutaneous daily  influenza   Vaccine 0.5 milliLiter(s) IntraMuscular once  lactated ringers. 1000 milliLiter(s) IV Continuous <Continuous>  levothyroxine 25 MICROGram(s) Oral daily  magnesium sulfate  IVPB 2 Gram(s) IV Intermittent once  piperacillin/tazobactam IVPB.. 3.375 Gram(s) IV Intermittent every 8 hours      PMHX/PSHX:  Diverticulitis  Hypothyroidism  H/O tubal ligation      Family history:  FH: myocardial infarction  FH: diabetes mellitus      Social History:     Allergies:  No Known Allergies        Review of Systems:  General:  No wt loss, fevers, chills, night sweats, fatigue or pruritis.  Eyes:  Good vision, no reported pain or redness.  ENT:  No sore throat, pain, runny nose, or difficulty swallowing  CV:  No pain, palpitations, hypo/hypertension  Resp:  No dyspnea, cough, tachypnea, wheezing  GI:  No pain, nausea, vomiting, dysphagia, heartburn, diarrhea, constipation, or weight loss. , No rectal bleeding, tarry stools, or hematemesis.  :  No pain, bleeding/discharges, incontinence, nocturia  Musculoskeletal:  No pain, weakness or fasciculations.  Neuro:  No weakness, tingling, memory problems or paresthesias  Psych:  No fatigue, insomnia, mood problems, depression  Endocrine:  No polyuria, polydipsia, cold/heat intolerance  Heme:  No petechiae, ecchymosis, easy bruisability  Skin:  No rash, pruritis, tattoos, scars, or edema      PHYSICAL EXAM:   Vital Signs:  Vital Signs Last 24 Hrs  T(C): 36.3 (31 Dec 2019 07:30), Max: 36.4 (30 Dec 2019 18:21)  T(F): 97.4 (31 Dec 2019 07:30), Max: 97.6 (30 Dec 2019 18:21)  HR: 65 (30 Dec 2019 23:38) (59 - 65)  BP: 138/85 (31 Dec 2019 07:30) (130/58 - 143/67)  BP(mean): --  RR: 20 (31 Dec 2019 07:30) (18 - 20)  SpO2: 97% (30 Dec 2019 15:53) (97% - 97%)  Daily     Daily     T(C): 36.3 (12-31-19 @ 07:30), Max: 36.4 (12-30-19 @ 18:21)  HR: 65 (12-30-19 @ 23:38) (59 - 65)  BP: 138/85 (12-31-19 @ 07:30) (130/58 - 143/67)  RR: 20 (12-31-19 @ 07:30) (18 - 20)  SpO2: 97% (12-30-19 @ 15:53) (97% - 97%)    GENERAL:  Appears stated age, well-groomed, well-nourished, no distress  HEENT:  Conjunctivae clear and pink, no thyromegaly, nodules, adenopathy, no JVD, sclera -anicteric  CHEST:  Full & symmetric excursion, no increased effort, breath sounds clear  HEART:  Regular rhythm, S1, S2, no murmur/rub/S3/S4, no abdominal bruit, no edema  ABDOMEN:  Soft, non-tender, non-distended, normoactive bowel sounds,  no masses ,no hepato-splenomegaly, no signs of chronic liver disease  EXTEREMITIES:  no cyanosis,clubbing or edema  SKIN:  No rash/erythema/ecchymoses/petechiae/wounds/abscess/warm/dry  NEURO:  Alert, oriented, no asterixis, no tremor, no encephalopathy    LABS:                        12.3   8.42  )-----------( 344      ( 30 Dec 2019 04:30 )             36.7     12-30    143  |  105  |  8<L>  ----------------------------<  90  4.4   |  26  |  0.8    Ca    9.1      30 Dec 2019 04:30  Mg     1.5     12-30    TPro  5.6<L>  /  Alb  3.6  /  TBili  0.5  /  DBili  x   /  AST  13  /  ALT  <5  /  AlkPhos  57  12-30    LIVER FUNCTIONS - ( 30 Dec 2019 04:30 )  Alb: 3.6 g/dL / Pro: 5.6 g/dL / ALK PHOS: 57 U/L / ALT: <5 U/L / AST: 13 U/L / GGT: x                   Imaging:      Assessment and Plan:    IMP:  pt feeling better. c/o hunger.  Pain is definitriely better.  WBC is 8.4 , afebrile       abd soft with mild para midline  hypogastric tenderness    REC:  Star clear fluid diet and increase slowly as tolerated  Continue I V antibiotcs - Piperacillen and Tazobactan
HOSPITALIST ATTENDING NOTE    KIM WEISS  64y Female  [id number]    INTERVAL HPI/OVERNIGHT EVENTS: still persistent LLQ pain - but slightly improved - no diarrhea, blood in stool  or fever    T(C): 36.4 (12-30-19 @ 08:30), Max: 36.9 (12-29-19 @ 13:58)  HR: 74 (12-30-19 @ 08:30) (65 - 89)  BP: 148/78 (12-30-19 @ 08:30) (124/75 - 148/78)  RR: 16 (12-30-19 @ 08:30) (16 - 18)  SpO2: 97% (12-30-19 @ 08:30) (97% - 100%)  Wt(kg): --      PHYSICAL EXAM:  GENERAL: NAD  HEAD:  Atraumatic, Normocephalic  EYES: EOMI, PERRLA, conjunctiva and sclera clear  ENMT: No tonsillar erythema, exudates, or enlargement  NECK: Supple, No JVD, Normal thyroid  NERVOUS SYSTEM:  Alert & Oriented X3, Good concentration; Non-focal- Motor Strength 5/5 B/L upper and lower extremities;  CHEST/LUNG: Clear to ascultation bilaterally; No rales, rhonchi, wheezing,   HEART: Regular rate and rhythm; No murmurs, rubs, or gallops  ABDOMEN: Soft, LLQ tenderness to palp - no rebound or guardiung Nondistended; Bowel sounds present  EXTREMITIES: No clubbing, cyanosis, or edema      Consultant(s) Notes Reviewed:  [x ] YES  [ ] NO  Care Discussed with Consultants/Other Providers/ Housestaff [ x] YES  [ ] NO    LABS:                        12.3   8.42  )-----------( 344      ( 30 Dec 2019 04:30 )             36.7     12-30    143  |  105  |  8<L>  ----------------------------<  90  4.4   |  26  |  0.8    Ca    9.1      30 Dec 2019 04:30  Mg     1.5     12-30    TPro  5.6<L>  /  Alb  3.6  /  TBili  0.5  /  DBili  x   /  AST  13  /  ALT  <5  /  AlkPhos  57  12-30    < from: CT Abdomen and Pelvis w/ IV Cont (12.29.19 @ 19:23) >    IMPRESSION:     Colonic diverticulosis with focal circumferential thickening of the sigmoid colon and pericolonic inflammatory changes. Findings consistent with uncomplicated diverticulitis, overall unchanged from CT abdomen and pelvis 12/9/2019.     < end of copied text >            RADIOLOGY & ADDITIONAL TESTS:    Imaging or report Personally Reviewed:  [ ] YES  [ ] NO    Case discussed with resident    Care discussed with pt/family      HEALTH ISSUES - PROBLEM Dx:
KIM WEISS  64y  [facility]      Patient is a 64y old  Female who presents with a chief complaint of Abdominal pain (02 Jan 2020 13:40)      INTERVAL HPI/OVERNIGHT EVENTS:  no events overnight       REVIEW OF SYSTEMS:  CONSTITUTIONAL: No fever, weight loss, or fatigue  EYES: No eye pain, visual disturbances, or discharge  ENMT:  No difficulty hearing, tinnitus, vertigo; No sinus or throat pain  NECK: No pain or stiffness  BREASTS: No pain, masses, or nipple discharge  RESPIRATORY: No cough, wheezing, chills or hemoptysis; No shortness of breath  CARDIOVASCULAR: No chest pain, palpitations, dizziness, or leg swelling  GASTROINTESTINAL: No abdominal or epigastric pain. No nausea, vomiting, or hematemesis; No diarrhea or constipation. No melena or hematochezia.  GENITOURINARY: No dysuria, frequency, hematuria, or incontinence  NEUROLOGICAL: No headaches, memory loss, loss of strength, numbness, or tremors  SKIN: No itching, burning, rashes, or lesions   LYMPH NODES: No enlarged glands  ENDOCRINE: No heat or cold intolerance; No hair loss  MUSCULOSKELETAL: No joint pain or swelling; No muscle, back, or extremity pain  PSYCHIATRIC: No depression, anxiety, mood swings, or difficulty sleeping  HEME/LYMPH: No easy bruising, or bleeding gums  ALLERY AND IMMUNOLOGIC: No hives or eczema  FAMILY HISTORY:  FH: myocardial infarction: mother  FH: diabetes mellitus: mother    T(C): 36.6 (01-03-20 @ 07:38), Max: 36.6 (01-03-20 @ 07:38)  HR: 73 (01-03-20 @ 07:38) (59 - 73)  BP: 141/69 (01-03-20 @ 07:38) (141/69 - 154/69)  RR: 18 (01-03-20 @ 07:38) (18 - 18)  SpO2: --  Wt(kg): --Vital Signs Last 24 Hrs  T(C): 36.6 (03 Jan 2020 07:38), Max: 36.6 (03 Jan 2020 07:38)  T(F): 97.9 (03 Jan 2020 07:38), Max: 97.9 (03 Jan 2020 07:38)  HR: 73 (03 Jan 2020 07:38) (59 - 73)  BP: 141/69 (03 Jan 2020 07:38) (141/69 - 154/69)  BP(mean): --  RR: 18 (03 Jan 2020 07:38) (18 - 18)  SpO2: --    PHYSICAL EXAM:  GENERAL: NAD, well-groomed, well-developed  HEAD:  Atraumatic, Normocephalic  EYES: EOMI, PERRLA, conjunctiva and sclera clear  ENMT: No tonsillar erythema, exudates, or enlargement; Moist mucous membranes, Good dentition, No lesions  NECK: Supple, No JVD, Normal thyroid  NERVOUS SYSTEM:  Alert & Oriented X3, Good concentration; Motor Strength 5/5 B/L upper and lower extremities; DTRs 2+ intact and symmetric  PULM: Clear to auscultation bilaterally  CARDIAC: Regular rate and rhythm; No murmurs, rubs, or gallops  GI: Soft, Nontender, Nondistended; Bowel sounds present  EXTREMITIES:  2+ Peripheral Pulses, No clubbing, cyanosis, or edema  LYMPH: No lymphadenopathy noted  SKIN: No rashes or lesions    Consultant(s) Notes Reviewed:  [x ] YES  [ ] NO  Care Discussed with Consultants/Other Providers [ x] YES  [ ] NO    LABS:                            15.0   8.65  )-----------( 403      ( 03 Jan 2020 04:30 )             43.4   01-03    141  |  102  |  11  ----------------------------<  96  4.3   |  26  |  0.8    Ca    9.9      03 Jan 2020 04:30    TPro  6.5  /  Alb  4.1  /  TBili  0.3  /  DBili  x   /  AST  29  /  ALT  12  /  AlkPhos  60  01-03            acetaminophen   Tablet .. 650 milliGRAM(s) Oral every 6 hours PRN  chlorhexidine 4% Liquid 1 Application(s) Topical <User Schedule>  enoxaparin Injectable 40 milliGRAM(s) SubCutaneous daily  ertapenem  IVPB 1000 milliGRAM(s) IV Intermittent every 24 hours  influenza   Vaccine 0.5 milliLiter(s) IntraMuscular once  levothyroxine 25 MICROGram(s) Oral daily      HEALTH ISSUES - PROBLEM Dx:          Case Discussed with House Staff      Spectra [phone]
K[facility] WEISS  64[facility]      Patient is a 64y old  Female who presents with a chief complaint of diverticulitis (31 Dec 2019 12:20)      INTERVAL HPI/OVERNIGHT EVENTS:    no acute overnight events, per patient she feels ready for diet advancement     REVIEW OF SYSTEMS:  CONSTITUTIONAL: No fever, weight loss, or fatigue  EYES: No eye pain, visual disturbances, or discharge  ENMT:  No difficulty hearing, tinnitus, vertigo; No sinus or throat pain  NECK: No pain or stiffness  BREASTS: No pain, masses, or nipple discharge  RESPIRATORY: No cough, wheezing, chills or hemoptysis; No shortness of breath  CARDIOVASCULAR: No chest pain, palpitations, dizziness, or leg swelling  GASTROINTESTINAL: No abdominal or epigastric pain. No nausea, vomiting, or hematemesis; No diarrhea or constipation. No melena or hematochezia.  GENITOURINARY: No dysuria, frequency, hematuria, or incontinence  NEUROLOGICAL: No headaches, memory loss, loss of strength, numbness, or tremors  SKIN: No itching, burning, rashes, or lesions   LYMPH NODES: No enlarged glands  ENDOCRINE: No heat or cold intolerance; No hair loss  MUSCULOSKELETAL: No joint pain or swelling; No muscle, back, or extremity pain  PSYCHIATRIC: No depression, anxiety, mood swings, or difficulty sleeping  HEME/LYMPH: No easy bruising, or bleeding gums  ALLERY AND IMMUNOLOGIC: No hives or eczema  FAMILY HISTORY:  FH: myocardial infarction: mother  FH: diabetes mellitus: mother    T(C): 36.2 (01-01-20 @ 07:30), Max: 36.2 (01-01-20 @ 07:30)  HR: 58 (01-01-20 @ 07:30) (58 - 59)  BP: 128/64 (01-01-20 @ 07:30) (128/64 - 144/82)  RR: 17 (01-01-20 @ 07:30) (17 - 17)  SpO2: --  Wt(kg): --Vital Signs Last 24 Hrs  T(C): 36.2 (01 Jan 2020 07:30), Max: 36.2 (01 Jan 2020 07:30)  T(F): 97.1 (01 Jan 2020 07:30), Max: 97.1 (01 Jan 2020 07:30)  HR: 58 (01 Jan 2020 07:30) (58 - 59)  BP: 128/64 (01 Jan 2020 07:30) (128/64 - 144/82)  BP(mean): --  RR: 17 (01 Jan 2020 07:30) (17 - 17)  SpO2: --    PHYSICAL EXAM:  GENERAL: NAD, well-groomed, well-developed  HEAD:  Atraumatic, Normocephalic  EYES: EOMI, PERRLA, conjunctiva and sclera clear  ENMT: No tonsillar erythema, exudates, or enlargement; Moist mucous membranes, Good dentition, No lesions  NECK: Supple, No JVD, Normal thyroid  NERVOUS SYSTEM:  Alert & Oriented X3, Good concentration; Motor Strength 5/5 B/L upper and lower extremities; DTRs 2+ intact and symmetric  PULM: Clear to auscultation bilaterally  CARDIAC: Regular rate and rhythm; No murmurs, rubs, or gallops  GI: Soft, Nontender, Nondistended; Bowel sounds present  EXTREMITIES:  2+ Peripheral Pulses, No clubbing, cyanosis, or edema  LYMPH: No lymphadenopathy noted  SKIN: No rashes or lesions                      acetaminophen   Tablet .. 650 milliGRAM(s) Oral every 6 hours PRN  chlorhexidine 4% Liquid 1 Application(s) Topical <User Schedule>  enoxaparin Injectable 40 milliGRAM(s) SubCutaneous daily  influenza   Vaccine 0.5 milliLiter(s) IntraMuscular once  lactated ringers. 1000 milliLiter(s) IV Continuous <Continuous>  levothyroxine 25 MICROGram(s) Oral daily  piperacillin/tazobactam IVPB.. 3.375 Gram(s) IV Intermittent every 8 hours      HEALTH ISSUES - PROBLEM Dx:          Case Discussed with House Staff     Spectra [phone]
KIM WEISS  64y Female  [id number]    INTERVAL HPI/OVERNIGHT EVENTS: still persistent LLQ pain - but improved - no diarrhea, blood in stool  or fever/ chills                                                        No BM today , WANTS TO TO EAT     Last Colonoscopy 3 years ago with Dr. Gibson and scheduled next month for another scope.  Last admission for diverticulitis was 10 years ago.    Vital Signs Last 24 Hrs  T(C): 36.3 (31 Dec 2019 07:30), Max: 36.4 (30 Dec 2019 18:21)  T(F): 97.4 (31 Dec 2019 07:30), Max: 97.6 (30 Dec 2019 18:21)  HR: 65 (30 Dec 2019 23:38) (59 - 65)  BP: 138/85 (31 Dec 2019 07:30) (130/58 - 143/67)  RR: 20 (31 Dec 2019 07:30) (18 - 20)  SpO2: 97% (30 Dec 2019 15:53) (97% - 97%)      PHYSICAL EXAM:  GENERAL: NAD  HEAD:  Atraumatic, Normocephalic  EYES: EOMI, PERRLA, conjunctiva and sclera clear  ENMT: No tonsillar erythema, exudates, or enlargement  NECK: Supple, No JVD, Normal thyroid  NERVOUS SYSTEM:  Alert & Oriented X3, Good concentration; Non-focal- Motor Strength 5/5 B/L upper and lower extremities;  CHEST/LUNG: Clear to ascultation bilaterally; No rales, rhonchi, wheezing,   HEART: Regular rate and rhythm; No murmurs, rubs, or gallops  ABDOMEN: Soft, LLQ tenderness to palp - no rebound or guarding Nondistended; Bowel sounds present  EXTREMITIES: No clubbing, cyanosis, or edema        LABS: NO NEW LABS TODAY                         12.3   8.42  )-----------( 344      ( 30 Dec 2019 04:30 )             36.7     12-30    143  |  105  |  8<L>  ----------------------------<  90  4.4   |  26  |  0.8    Ca    9.1      30 Dec 2019 04:30  Mg     1.5     12-30    TPro  5.6<L>  /  Alb  3.6  /  TBili  0.5  /  DBili  x   /  AST  13  /  ALT  <5  /  AlkPhos  57  12-30    < from: CT Abdomen and Pelvis w/ IV Cont (12.29.19 @ 19:23) >    IMPRESSION:     Colonic diverticulosis with focal circumferential thickening of the sigmoid colon and pericolonic inflammatory changes. Findings consistent with uncomplicated diverticulitis, overall unchanged from CT abdomen and pelvis 12/9/2019.     < end of copied text >            RADIOLOGY & ADDITIONAL TESTS:    Imaging or report Personally Reviewed:  [ ] YES  [ ] NO    Case discussed with resident    Care discussed with pt/family      HEALTH ISSUES - PROBLEM Dx:
Patient is a 64y old  Female who presents with a chief complaint of diverticulitis (31 Dec 2019 12:20)      HPI:  65 y/o F w/ PMHx of hypothyroidism and prior diverticulitis roughly 10 years ago presents for abdominal pain. History goes back to Nov 26th when patient first started to feel a left lower quadrant that gives a gassy/bloating sensation. She started oral cipro and flagyl with no improvement then subsequently went to urgent care where a CT scan shows diverticulitis and she continued with antibiotics. However there was no improvement and then patient came to [facility] ED where another CT scan showed persistent diverticulitis. Patient sent home on continued cipro and flagyl. Saw her gastroenterologist where she was then started on Augmentin on Dec 13th. Patient has continued to have to improvement in pain after finishing Augmentin. Now present to ED with persistent pain. Her pain is associated with pain with defecation. Denies any nausea, vomiting, fever, night sweats, blood per rectum. (29 Dec 2019 21:35)      INTERVAL HPI/OVERNIGHT EVENTS: Pt seen and examined at bedside, in NAD. Denies N/V/D, abdominal pain, CP, SOB, palpitations, fevers.Pt  doing well and tolerating diet.  Pain is minimal.      REVIEW OF SYSTEMS:  CONSTITUTIONAL: No fever, weight loss, or fatigue  EYES: No eye pain, visual disturbances, or discharge  ENMT:  No difficulty hearing, tinnitus, vertigo; No sinus or throat pain  NECK: No pain or stiffness  BREASTS: No pain, no masses,   RESPIRATORY: No cough, wheezing, chills or hemoptysis; No shortness of breath  CARDIOVASCULAR: No chest pain, palpitations, dizziness, or leg swelling  GASTROINTESTINAL: No abdominal or epigastric pain. No nausea, vomiting, or hematemesis; No diarrhea or constipation. No melena or hematochezia.  GENITOURINARY: No dysuria, frequency, hematuria, or incontinence  NEUROLOGICAL: No headaches, memory loss, loss of strength, numbness, or tremors  SKIN: No itching, burning, rashes, or lesions   LYMPH NODES: No enlarged glands  MUSCULOSKELETAL: No joint pain or swelling; No muscle, back, or extremity pain  PSYCHIATRIC: No depression, anxiety, mood swings, or difficulty sleeping  ALLERY No hives or eczema    PHYSICAL EXAM:  GENERAL: NAD, well-groomed, well-developed  HEAD:  Atraumatic, Normocephalic  EYES: EOMI, PERRLA, conjunctiva and sclera clear  ENMT: No tonsillar erythema, exudates, or enlargement; Moist mucous membranes, Good dentition, No lesions  NECK: Supple, No JVD, Normal thyroid  NERVOUS SYSTEM:  Alert & Oriented X3, Good concentration; Motor Strength 5/5 B/L upper and lower extremities; DTRs 2+ intact and symmetric  CHEST/LUNG: Clear to percussion bilaterally; No rales, rhonchi, wheezing, or rubs  HEART: Regular rate and rhythm; No murmurs, rubs, or gallops  ABDOMEN: Soft, Nontender, Nondistended; Bowel sounds present  EXTREMITIES:  2+ Peripheral Pulses, No clubbing, cyanosis, or edema  LYMPH: No lymphadenopathy noted  SKIN: No rashes or lesions    T(C): 36.5 (01-02-20 @ 07:30), Max: 36.5 (01-02-20 @ 07:30)  HR: 58 (01-02-20 @ 07:30) (53 - 58)  BP: 129/65 (01-02-20 @ 07:30) (129/65 - 160/56)  RR: 17 (01-02-20 @ 07:30) (17 - 18)  SpO2: --  Wt(kg): --  I&O's Summary    01 Jan 2020 07:01  -  02 Jan 2020 07:00  --------------------------------------------------------  IN: 780 mL / OUT: 0 mL / NET: 780 mL        MEDICATIONS  (STANDING):  chlorhexidine 4% Liquid 1 Application(s) Topical <User Schedule>  enoxaparin Injectable 40 milliGRAM(s) SubCutaneous daily  influenza   Vaccine 0.5 milliLiter(s) IntraMuscular once  levothyroxine 25 MICROGram(s) Oral daily  piperacillin/tazobactam IVPB.. 3.375 Gram(s) IV Intermittent every 8 hours    MEDICATIONS  (PRN):  acetaminophen   Tablet .. 650 milliGRAM(s) Oral every 6 hours PRN Temp greater or equal to 38C (100.4F), Mild Pain (1 - 3)      LABS:              CAPILLARY BLOOD GLUCOSE                  RADIOLOGY & ADDITIONAL TESTS:    Imaging Personally Reviewed:       Advance Directives:      Palliative Care:
Pt was not d/c'd yesterday  due to insurance issues on IV antibiotic [id number] at home - problem being worked out with D/c today.  Pt is feeling good denies any abdo pain.  Tolerating soft diet  	Abd - soft, n on tender no masses    Plan - D/C today.  continue IV antibiotics at home  Will see pt in 1 week in office
SUBJECTIVE:    Patient is a 64y old Female who presents with a chief complaint of Diverticul;itis (02 Jan 2020 12:23)    Currently admitted to medicine with the primary diagnosis of Diverticulitis     Today is hospital day 4d. This morning she is resting comfortably in bed and reports no new issues or overnight events.     PAST MEDICAL & SURGICAL HISTORY  Diverticulitis  Hypothyroidism  H/O tubal ligation    SOCIAL HISTORY:  Negative for smoking/alcohol/drug use.     ALLERGIES:  No Known Allergies    MEDICATIONS:  STANDING MEDICATIONS  chlorhexidine 4% Liquid 1 Application(s) Topical <User Schedule>  enoxaparin Injectable 40 milliGRAM(s) SubCutaneous daily  influenza   Vaccine 0.5 milliLiter(s) IntraMuscular once  levothyroxine 25 MICROGram(s) Oral daily  piperacillin/tazobactam IVPB.. 3.375 Gram(s) IV Intermittent every 8 hours    PRN MEDICATIONS  acetaminophen   Tablet .. 650 milliGRAM(s) Oral every 6 hours PRN    VITALS:   T(F): 97.7  HR: 58  BP: 129/65  RR: 17  SpO2: --    LABS:                        RADIOLOGY:  no radiology in past 24 hours.  PHYSICAL EXAM:  GEN: No acute distress  LUNGS: Clear to auscultation bilaterally   HEART: S1/S2 present. RRR.   ABD: Soft, non-tender, non-distended. Bowel sounds present  EXT: NC/NC/NE/2+PP/GAY  NEURO: AAOX3

## 2020-01-03 NOTE — PROGRESS NOTE ADULT - ATTENDING COMMENTS
REINA Aranda
REINA Aranda
patient seen and examined , agree with pa assesment and plan except as indicated above,   GEN Lying in no acute distress  HEENT Pupils equal and reactive to light and accommodationSupple Neck  PULM Clear to auscultation bilaterally  CV s1s2 regular rate and rhythm  GI + bowel sounds tender llq  EXT no cyanosis or edema  PSYCH awake alert and oriented x 3  INTEG No Lesions  NEURO GAY  #LLQ abdominal pain secondary to recurrent uncomplicated diverticulitis   cw zosyn   dw gi will advance to clears today , if tolerates gi soft tonight   id consult given failure of abx regiments  surgery consult for recurrent diverticulitis   #Atelectasis deep breathing   #CKD 2   #H/o 3mm left lower lobe granule no appreciated on this admission ct  #h/o chronic pancreatitis avoid toxins   Progress Note Handoff    Pending:  ID consult for abx recommendation, and surgery consult   Family discussion: patient is of sound mind and verbalizes understanding to the plan of care    Disposition: Home___
patient seen and examined , agree with pgy 1 assesment and plan except as indicated above,   GEN Lying in no acute distress  HEENT Pupils equal and reactive to light and accommodationSupple Neck  PULM Clear to auscultation bilaterally  CV s1s2 regular rate and rhythm  GI + bowel sounds nontnender  EXT no cyanosis or edema  PSYCH awake alert and oriented x 3  INTEG No Lesions  NEURO GAY  DC HOME to continue with ertapenum x 14 days, time spent 42 minutes coordinating care

## 2020-01-08 DIAGNOSIS — E83.42 HYPOMAGNESEMIA: ICD-10-CM

## 2020-01-08 DIAGNOSIS — K57.32 DIVERTICULITIS OF LARGE INTESTINE WITHOUT PERFORATION OR ABSCESS WITHOUT BLEEDING: ICD-10-CM

## 2020-01-08 DIAGNOSIS — K86.89 OTHER SPECIFIED DISEASES OF PANCREAS: ICD-10-CM

## 2020-01-08 DIAGNOSIS — J98.11 ATELECTASIS: ICD-10-CM

## 2020-01-08 DIAGNOSIS — E03.9 HYPOTHYROIDISM, UNSPECIFIED: ICD-10-CM

## 2020-01-08 DIAGNOSIS — N18.2 CHRONIC KIDNEY DISEASE, STAGE 2 (MILD): ICD-10-CM

## 2020-01-08 DIAGNOSIS — Z59.7 INSUFFICIENT SOCIAL INSURANCE AND WELFARE SUPPORT: ICD-10-CM

## 2020-01-08 DIAGNOSIS — F17.210 NICOTINE DEPENDENCE, CIGARETTES, UNCOMPLICATED: ICD-10-CM

## 2020-01-08 SDOH — ECONOMIC STABILITY - INCOME SECURITY: INSUFFICIENT SOCIAL INSURANCE AND WELFARE SUPPORT: Z59.7

## 2020-01-29 ENCOUNTER — INPATIENT (INPATIENT)
Facility: HOSPITAL | Age: 65
LOS: 16 days | Discharge: HOME | End: 2020-02-15
Attending: INTERNAL MEDICINE | Admitting: INTERNAL MEDICINE
Payer: MEDICARE

## 2020-01-29 VITALS
WEIGHT: 119.93 LBS | DIASTOLIC BLOOD PRESSURE: 68 MMHG | HEIGHT: 62 IN | SYSTOLIC BLOOD PRESSURE: 120 MMHG | OXYGEN SATURATION: 99 % | RESPIRATION RATE: 18 BRPM | TEMPERATURE: 98 F | HEART RATE: 115 BPM

## 2020-01-29 DIAGNOSIS — Z98.51 TUBAL LIGATION STATUS: Chronic | ICD-10-CM

## 2020-01-29 PROBLEM — E03.9 HYPOTHYROIDISM, UNSPECIFIED: Chronic | Status: ACTIVE | Noted: 2019-12-29

## 2020-01-29 PROBLEM — K57.92 DIVERTICULITIS OF INTESTINE, PART UNSPECIFIED, WITHOUT PERFORATION OR ABSCESS WITHOUT BLEEDING: Chronic | Status: ACTIVE | Noted: 2019-12-29

## 2020-01-29 LAB
ALBUMIN SERPL ELPH-MCNC: 4.4 G/DL — SIGNIFICANT CHANGE UP (ref 3.5–5.2)
ALP SERPL-CCNC: 93 U/L — SIGNIFICANT CHANGE UP (ref 30–115)
ALT FLD-CCNC: <5 U/L — SIGNIFICANT CHANGE UP (ref 0–41)
ANION GAP SERPL CALC-SCNC: 16 MMOL/L — HIGH (ref 7–14)
APPEARANCE UR: CLEAR — SIGNIFICANT CHANGE UP
AST SERPL-CCNC: 18 U/L — SIGNIFICANT CHANGE UP (ref 0–41)
BASOPHILS # BLD AUTO: 0.05 K/UL — SIGNIFICANT CHANGE UP (ref 0–0.2)
BASOPHILS NFR BLD AUTO: 0.2 % — SIGNIFICANT CHANGE UP (ref 0–1)
BILIRUB SERPL-MCNC: 0.5 MG/DL — SIGNIFICANT CHANGE UP (ref 0.2–1.2)
BILIRUB UR-MCNC: NEGATIVE — SIGNIFICANT CHANGE UP
BUN SERPL-MCNC: 10 MG/DL — SIGNIFICANT CHANGE UP (ref 10–20)
CALCIUM SERPL-MCNC: 10 MG/DL — SIGNIFICANT CHANGE UP (ref 8.5–10.1)
CHLORIDE SERPL-SCNC: 98 MMOL/L — SIGNIFICANT CHANGE UP (ref 98–110)
CO2 SERPL-SCNC: 24 MMOL/L — SIGNIFICANT CHANGE UP (ref 17–32)
COLOR SPEC: YELLOW — SIGNIFICANT CHANGE UP
CREAT SERPL-MCNC: 0.9 MG/DL — SIGNIFICANT CHANGE UP (ref 0.7–1.5)
DIFF PNL FLD: NEGATIVE — SIGNIFICANT CHANGE UP
EOSINOPHIL # BLD AUTO: 0.07 K/UL — SIGNIFICANT CHANGE UP (ref 0–0.7)
EOSINOPHIL NFR BLD AUTO: 0.3 % — SIGNIFICANT CHANGE UP (ref 0–8)
EPI CELLS # UR: ABNORMAL /HPF
GLUCOSE SERPL-MCNC: 95 MG/DL — SIGNIFICANT CHANGE UP (ref 70–99)
GLUCOSE UR QL: NEGATIVE MG/DL — SIGNIFICANT CHANGE UP
HCT VFR BLD CALC: 41 % — SIGNIFICANT CHANGE UP (ref 37–47)
HGB BLD-MCNC: 13.8 G/DL — SIGNIFICANT CHANGE UP (ref 12–16)
IMM GRANULOCYTES NFR BLD AUTO: 0.4 % — HIGH (ref 0.1–0.3)
KETONES UR-MCNC: 40
LEUKOCYTE ESTERASE UR-ACNC: ABNORMAL
LYMPHOCYTES # BLD AUTO: 12.6 % — LOW (ref 20.5–51.1)
LYMPHOCYTES # BLD AUTO: 2.66 K/UL — SIGNIFICANT CHANGE UP (ref 1.2–3.4)
MCHC RBC-ENTMCNC: 33 PG — HIGH (ref 27–31)
MCHC RBC-ENTMCNC: 33.7 G/DL — SIGNIFICANT CHANGE UP (ref 32–37)
MCV RBC AUTO: 98.1 FL — SIGNIFICANT CHANGE UP (ref 81–99)
MONOCYTES # BLD AUTO: 1.2 K/UL — HIGH (ref 0.1–0.6)
MONOCYTES NFR BLD AUTO: 5.7 % — SIGNIFICANT CHANGE UP (ref 1.7–9.3)
NEUTROPHILS # BLD AUTO: 17.02 K/UL — HIGH (ref 1.4–6.5)
NEUTROPHILS NFR BLD AUTO: 80.8 % — HIGH (ref 42.2–75.2)
NITRITE UR-MCNC: NEGATIVE — SIGNIFICANT CHANGE UP
NRBC # BLD: 0 /100 WBCS — SIGNIFICANT CHANGE UP (ref 0–0)
PH UR: 7 — SIGNIFICANT CHANGE UP (ref 5–8)
PLATELET # BLD AUTO: 394 K/UL — SIGNIFICANT CHANGE UP (ref 130–400)
POTASSIUM SERPL-MCNC: 4.6 MMOL/L — SIGNIFICANT CHANGE UP (ref 3.5–5)
POTASSIUM SERPL-SCNC: 4.6 MMOL/L — SIGNIFICANT CHANGE UP (ref 3.5–5)
PROT SERPL-MCNC: 7.7 G/DL — SIGNIFICANT CHANGE UP (ref 6–8)
PROT UR-MCNC: NEGATIVE MG/DL — SIGNIFICANT CHANGE UP
RBC # BLD: 4.18 M/UL — LOW (ref 4.2–5.4)
RBC # FLD: 12.5 % — SIGNIFICANT CHANGE UP (ref 11.5–14.5)
RBC CASTS # UR COMP ASSIST: NEGATIVE — SIGNIFICANT CHANGE UP
SODIUM SERPL-SCNC: 138 MMOL/L — SIGNIFICANT CHANGE UP (ref 135–146)
SP GR SPEC: 1.01 — SIGNIFICANT CHANGE UP (ref 1.01–1.03)
UROBILINOGEN FLD QL: 0.2 MG/DL — SIGNIFICANT CHANGE UP (ref 0.2–0.2)
WBC # BLD: 21.09 K/UL — HIGH (ref 4.8–10.8)
WBC # FLD AUTO: 21.09 K/UL — HIGH (ref 4.8–10.8)
WBC UR QL: SIGNIFICANT CHANGE UP /HPF

## 2020-01-29 PROCEDURE — 99223 1ST HOSP IP/OBS HIGH 75: CPT

## 2020-01-29 PROCEDURE — 99285 EMERGENCY DEPT VISIT HI MDM: CPT

## 2020-01-29 PROCEDURE — 74177 CT ABD & PELVIS W/CONTRAST: CPT | Mod: 26

## 2020-01-29 RX ORDER — ERTAPENEM SODIUM 1 G/1
1000 INJECTION, POWDER, LYOPHILIZED, FOR SOLUTION INTRAMUSCULAR; INTRAVENOUS EVERY 24 HOURS
Refills: 0 | Status: COMPLETED | OUTPATIENT
Start: 2020-01-30 | End: 2020-02-03

## 2020-01-29 RX ORDER — SODIUM CHLORIDE 9 MG/ML
1000 INJECTION, SOLUTION INTRAVENOUS
Refills: 0 | Status: DISCONTINUED | OUTPATIENT
Start: 2020-01-29 | End: 2020-02-01

## 2020-01-29 RX ORDER — CHLORHEXIDINE GLUCONATE 213 G/1000ML
1 SOLUTION TOPICAL
Refills: 0 | Status: DISCONTINUED | OUTPATIENT
Start: 2020-01-29 | End: 2020-02-15

## 2020-01-29 RX ORDER — PANTOPRAZOLE SODIUM 20 MG/1
40 TABLET, DELAYED RELEASE ORAL
Refills: 0 | Status: DISCONTINUED | OUTPATIENT
Start: 2020-01-29 | End: 2020-01-31

## 2020-01-29 RX ORDER — ERTAPENEM SODIUM 1 G/1
1000 INJECTION, POWDER, LYOPHILIZED, FOR SOLUTION INTRAMUSCULAR; INTRAVENOUS ONCE
Refills: 0 | Status: COMPLETED | OUTPATIENT
Start: 2020-01-29 | End: 2020-01-29

## 2020-01-29 RX ORDER — MORPHINE SULFATE 50 MG/1
1 CAPSULE, EXTENDED RELEASE ORAL EVERY 6 HOURS
Refills: 0 | Status: DISCONTINUED | OUTPATIENT
Start: 2020-01-29 | End: 2020-01-29

## 2020-01-29 RX ORDER — LEVOTHYROXINE SODIUM 125 MCG
25 TABLET ORAL DAILY
Refills: 0 | Status: DISCONTINUED | OUTPATIENT
Start: 2020-01-29 | End: 2020-02-15

## 2020-01-29 RX ADMIN — MORPHINE SULFATE 1 MILLIGRAM(S): 50 CAPSULE, EXTENDED RELEASE ORAL at 22:14

## 2020-01-29 RX ADMIN — SODIUM CHLORIDE 75 MILLILITER(S): 9 INJECTION, SOLUTION INTRAVENOUS at 22:17

## 2020-01-29 RX ADMIN — MORPHINE SULFATE 1 MILLIGRAM(S): 50 CAPSULE, EXTENDED RELEASE ORAL at 23:00

## 2020-01-29 RX ADMIN — ERTAPENEM SODIUM 120 MILLIGRAM(S): 1 INJECTION, POWDER, LYOPHILIZED, FOR SOLUTION INTRAMUSCULAR; INTRAVENOUS at 18:07

## 2020-01-29 NOTE — CONSULT NOTE ADULT - ASSESSMENT
66 yo female with diverticulitis and poss intramural abscess.          Plan:  - no acute surgical intervention  - NPO, IVF  - serial abd exams  - fup CBC  - restart IV Ertapenem - if responds medically ideally she should have an extended treatment and go for elective colon resection while still on antibx. She will need colonoscopy prior.    All above d/w DR Camargo 64 yo female with diverticulitis and poss intramural abscess.          Plan:  - no acute surgical intervention  - NPO, IVF  - serial abd exams  - fup CBC  - restart IV Ertapenem - if responds medically ideally she should have an extended treatment and go for elective colon resection while still on antibx. She will need colonoscopy prior.  - GI eval with Dr Aranda    All above d/w DR Camargo

## 2020-01-29 NOTE — H&P ADULT - NSHPPHYSICALEXAM_GEN_ALL_CORE
PHYSICAL EXAM:    CONSTITUTIONAL: NAD, somnolent   ENMT: EOMI, PERRLA, No tonsillar erythema, exudates, or enlargement, neck supple, No JVD  PSYCH: Alert & Oriented X3  RESPIRATORY: Clear to percussion bilaterally; No rales, rhonchi, wheezing, or rubs  CARDIOVASCULAR: Regular rate and rhythm; No murmurs, rubs, or gallops, negative edema  GASTROINTESTINAL: Soft, Nontender, Nondistended; Bowel sounds present  EXTREMITIES:  2+ Peripheral Pulses, No clubbing, cyanosis  SKIN: No rashes or lesions PHYSICAL EXAM:    CONSTITUTIONAL: NAD, comfortable lying on stretcher  ENMT: EOMI, PERRLA, No tonsillar erythema, exudates, or enlargement, neck supple, No JVD  PSYCH: Alert & Oriented X3  RESPIRATORY: Clear to percussion bilaterally; No rales, rhonchi, wheezing, or rubs  CARDIOVASCULAR: Regular rate and rhythm; No murmurs, rubs, or gallops, negative edema  GASTROINTESTINAL: Soft, LLQ tenderness on deep palpation, Nondistended; Bowel sounds present  EXTREMITIES:  2+ Peripheral Pulses, No clubbing, cyanosis  SKIN: No rashes or lesions

## 2020-01-29 NOTE — ED PROVIDER NOTE - CLINICAL SUMMARY MEDICAL DECISION MAKING FREE TEXT BOX
Results reviewed and d/w patient, copies of reports given to her.  Surgery consulted.  Will need IV abx and admission,  Pt is aware of the plan.  She has declined pain meds while in ED,

## 2020-01-29 NOTE — H&P ADULT - HISTORY OF PRESENT ILLNESS
63 yo F with PMHx of Hypothyroidism, Recurrent Diverticulitis, recently admitted at [facility] and discharged on 01/03/2020 for Diverticulitis failed on outpatient antibiotics, presented with complaint of abdominal pain associated with decreased appetite on day of presentation. Patient denies fever, chills, nausea, vomiting, shortness of breath, changes in bowel or urinary habits. 65 yo F with PMHx of Hypothyroidism, Recurrent Diverticulitis, recently admitted at [facility] and discharged on 01/03/2020 for Diverticulitis failed on outpatient antibiotics, presented with complaint of dull, at times radiating to back left lower quadrant abdominal pain associated with decreased appetite since Sunday. Patient was seen by GI yesterday, reported to have constipation. Patient denies fever, chills, nausea, vomiting, shortness of breath, last bowel movement this morning, positive flatus. 65 yo F with PMHx of Hypothyroidism, Recurrent Diverticulitis, recently admitted at [facility] and discharged on 01/03/2020 for Diverticulitis failed on outpatient antibiotics, presented with complaint of dull, at times radiating to back left lower quadrant abdominal pain associated with decreased appetite since Sunday, pain currently 8/10, although patient appears comfortable. Patient was seen by GI yesterday, reported to have constipation. Patient denies fever, chills, nausea, vomiting, shortness of breath, last bowel movement this morning, positive flatus.

## 2020-01-29 NOTE — ED PROVIDER NOTE - ATTENDING CONTRIBUTION TO CARE
66 yo F PMHx noted presents with c/o lower abd pain x 3 days.  Pt with recent diverticulitis treated at the end of November with Cipro/Flagyl, then with Augmentin.  She presented to ED 12/29 and admitted at that time.  She was d/etelvina on 2 week course of IV Ertapenem which she completed on 1/17.  Pt states she did feel better, but never 100%.  Reports low grade fever last night,  no urinary complaints, no diarrhea.  Saw GI yesterday and is scheduled for colonoscopy 2/20.  On exam pt in NAD AAO x 3, non toxic appearing, OP clear, MMM, Lungs cta b/l, abd is soft + BS, + tender LLQ, no rebound

## 2020-01-29 NOTE — CONSULT NOTE ADULT - SUBJECTIVE AND OBJECTIVE BOX
HPI: 64 yo female presents to ER with c/o LLQ pain and low grade fever. Pt has been treated for diverticulitis with several different antibx since end of November. First she was on Cipro/Flagyl as OP then switched to Augmentin, when she did not resolve she was admitted for IV antibx and sent home on 2 weeks of IV Ertapenem - this finished on . Pt admits she never felt 100% resolved but was feeling well while on IV Ertapenem.   Admits to decreased appetite beginning today. States she has been having normal BMS daily, but sometimes takes laxative when she feels increased rectal pressure and can not hv BM.        PAST MEDICAL & SURGICAL HISTORY:  Diverticulitis  Hypothyroidism  H/O tubal ligation      MEDICATIONS  (STANDING):  ertapenem  IVPB 1000 milliGRAM(s) IV Intermittent Once    MEDICATIONS  (PRN):      Allergies    No Known Allergies    Intolerances        SOCIAL HISTORY:    FAMILY HISTORY:  FH: myocardial infarction: mother  FH: diabetes mellitus: mother          Physical Exam:  General: NAD, resting comfortably  HEENT: NC/AT, EOMI, normal hearing, no oral lesions, no LAD, neck supple  Pulmonary: normal resp effort, CTA-B  Cardiovascular: NSR, no murmurs  Abdominal: soft, ND, + LLQ tenderness, no organomegaly  Extremities: WWP, normal strength, no clubbing/cyanosis/edema  Neuro: A/O x 3, CNs II-XII grossly intact, normal sensation, no focal deficits  Pulses: palpable distal pulses    Vital Signs Last 24 Hrs  T(C): 36.8 (2020 13:26), Max: 36.8 (2020 13:26)  T(F): 98.2 (2020 13:26), Max: 98.2 (2020 13:26)  HR: 115 (2020 13:26) (115 - 115)  BP: 120/68 (2020 13:26) (120/68 - 120/68)  BP(mean): --  RR: 18 (2020 13:26) (18 - 18)  SpO2: 99% (2020 13:26) (99% - 99%)    I&O's Summary          LABS:                        13.8   21.09 )-----------( 394      ( 2020 14:05 )             41.0         138  |  98  |  10  ----------------------------<  95  4.6   |  24  |  0.9    Ca    10.0      2020 14:05    TPro  7.7  /  Alb  4.4  /  TBili  0.5  /  DBili  x   /  AST  18  /  ALT  <5  /  AlkPhos  93        Urinalysis Basic - ( 2020 14:05 )    Color: Yellow / Appearance: Clear / S.015 / pH: x  Gluc: x / Ketone: 40  / Bili: Negative / Urobili: 0.2 mg/dL   Blood: x / Protein: Negative mg/dL / Nitrite: Negative   Leuk Esterase: Trace / RBC: Negative / WBC 3-5 /HPF   Sq Epi: x / Non Sq Epi: Few /HPF / Bacteria: x      CAPILLARY BLOOD GLUCOSE        LIVER FUNCTIONS - ( 2020 14:05 )  Alb: 4.4 g/dL / Pro: 7.7 g/dL / ALK PHOS: 93 U/L / ALT: <5 U/L / AST: 18 U/L / GGT: x                   RADIOLOGY & ADDITIONAL STUDIES:  < from: CT Abdomen and Pelvis w/ IV Cont (20 @ 16:29) >  IMPRESSION: Colonic diverticulosis and diverticulitis is again noted. Compared with the previous study there is increased wall thickening around a diverticulum seen along the posterior margin of the sigmoid colon. (Series 2, image [id number]. There is increased gas and fluid within this diverticulum. The findings are compatible with an intramural abscess in this location.                      ANGELA LARSON M.D., ATTENDING RADIOLOGIST  This document has been electronically signed. 2020  4:49PM        < end of copied text >        Plan:

## 2020-01-29 NOTE — H&P ADULT - ASSESSMENT
64 yo F presented with abdominal pain.    #Recurrent Colonic Diverticulitis, Intramural Abscess: As per ED's discussion with Surgery, no need for IR at this time, surgery recommendations noted, awaiting GI, and ID evaluation (will continue with Ertapenem at this time), NPO, IVF, pain control, monitor fever curve    #Hypothyroidism: continue synthroid    Disposition: Home when medically stable.

## 2020-01-29 NOTE — ED PROVIDER NOTE - PHYSICAL EXAMINATION
--EXAM--  VITAL SIGNS: I have reviewed vs documented at present.  CONSTITUTIONAL: Well-developed; well-nourished; in no acute distress.   SKIN: Warm and dry, no acute rash.   HEAD: Normocephalic; atraumatic.  EYES: PERRL, EOM intact; conjunctiva and sclera clear. No nystagmus.  ENT: No nasal discharge; airway clear.  NECK: Supple; non tender.  CARD: S1, S2, Regular rate and rhythm.   RESP: No wheezes, rales or rhonchi.  ABD: Normal bowel sounds; soft; non-distended; tender LLQ  EXT: Normal ROM.   NEURO: Alert, oriented, grossly unremarkable. Strength 5/5 in all extremities. Sensation intact throughout.  PSYCH: Cooperative, appropriate.

## 2020-01-29 NOTE — H&P ADULT - NSHPLABSRESULTS_GEN_ALL_CORE
13.8   21.09 )-----------( 394      ( 2020 14:05 )             41.0           138  |  98  |  10  ----------------------------<  95  4.6   |  24  |  0.9    Ca    10.0      2020 14:05    TPro  7.7  /  Alb  4.4  /  TBili  0.5  /  DBili  x   /  AST  18  /  ALT  <5  /  AlkPhos  93                Urinalysis Basic - ( 2020 14:05 )    Color: Yellow / Appearance: Clear / S.015 / pH: x  Gluc: x / Ketone: 40  / Bili: Negative / Urobili: 0.2 mg/dL   Blood: x / Protein: Negative mg/dL / Nitrite: Negative   Leuk Esterase: Trace / RBC: Negative / WBC 3-5 /HPF   Sq Epi: x / Non Sq Epi: Few /HPF / Bacteria: x    CT ABDOMEN AND PELVIS:      IMPRESSION: Colonic diverticulosis and diverticulitis is again noted. Compared with the previous study there is increased wall thickening around a diverticulum seen along the posterior margin of the sigmoid colon. (Series 2, image [id number]. There is increased gas and fluid within this diverticulum. The findings are compatible with an intramural abscess in this location. 13.8   21.09 )-----------( 394      ( 2020 14:05 )             41.0           138  |  98  |  10  ----------------------------<  95  4.6   |  24  |  0.9    Ca    10.0      2020 14:05    TPro  7.7  /  Alb  4.4  /  TBili  0.5  /  DBili  x   /  AST  18  /  ALT  <5  /  AlkPhos  93            Urinalysis Basic - ( 2020 14:05 )    Color: Yellow / Appearance: Clear / S.015 / pH: x  Gluc: x / Ketone: 40  / Bili: Negative / Urobili: 0.2 mg/dL   Blood: x / Protein: Negative mg/dL / Nitrite: Negative   Leuk Esterase: Trace / RBC: Negative / WBC 3-5 /HPF   Sq Epi: x / Non Sq Epi: Few /HPF / Bacteria: x    CT ABDOMEN AND PELVIS:      IMPRESSION: Colonic diverticulosis and diverticulitis is again noted. Compared with the previous study there is increased wall thickening around a diverticulum seen along the posterior margin of the sigmoid colon. (Series 2, image [id number]. There is increased gas and fluid within this diverticulum. The findings are compatible with an intramural abscess in this location.

## 2020-01-30 DIAGNOSIS — E03.9 HYPOTHYROIDISM, UNSPECIFIED: ICD-10-CM

## 2020-01-30 DIAGNOSIS — K57.20 DIVERTICULITIS OF LARGE INTESTINE WITH PERFORATION AND ABSCESS WITHOUT BLEEDING: ICD-10-CM

## 2020-01-30 LAB
ALBUMIN SERPL ELPH-MCNC: 3.7 G/DL — SIGNIFICANT CHANGE UP (ref 3.5–5.2)
ALP SERPL-CCNC: 87 U/L — SIGNIFICANT CHANGE UP (ref 30–115)
ALT FLD-CCNC: <5 U/L — SIGNIFICANT CHANGE UP (ref 0–41)
ANION GAP SERPL CALC-SCNC: 20 MMOL/L — HIGH (ref 7–14)
AST SERPL-CCNC: 10 U/L — SIGNIFICANT CHANGE UP (ref 0–41)
BASOPHILS # BLD AUTO: 0.04 K/UL — SIGNIFICANT CHANGE UP (ref 0–0.2)
BASOPHILS NFR BLD AUTO: 0.3 % — SIGNIFICANT CHANGE UP (ref 0–1)
BILIRUB SERPL-MCNC: 0.5 MG/DL — SIGNIFICANT CHANGE UP (ref 0.2–1.2)
BUN SERPL-MCNC: 9 MG/DL — LOW (ref 10–20)
CALCIUM SERPL-MCNC: 9.1 MG/DL — SIGNIFICANT CHANGE UP (ref 8.5–10.1)
CHLORIDE SERPL-SCNC: 98 MMOL/L — SIGNIFICANT CHANGE UP (ref 98–110)
CO2 SERPL-SCNC: 21 MMOL/L — SIGNIFICANT CHANGE UP (ref 17–32)
CREAT SERPL-MCNC: 0.7 MG/DL — SIGNIFICANT CHANGE UP (ref 0.7–1.5)
EOSINOPHIL # BLD AUTO: 0.18 K/UL — SIGNIFICANT CHANGE UP (ref 0–0.7)
EOSINOPHIL NFR BLD AUTO: 1.2 % — SIGNIFICANT CHANGE UP (ref 0–8)
GLUCOSE SERPL-MCNC: 68 MG/DL — LOW (ref 70–99)
HCT VFR BLD CALC: 36 % — LOW (ref 37–47)
HGB BLD-MCNC: 12.1 G/DL — SIGNIFICANT CHANGE UP (ref 12–16)
IMM GRANULOCYTES NFR BLD AUTO: 0.5 % — HIGH (ref 0.1–0.3)
LYMPHOCYTES # BLD AUTO: 15.3 % — LOW (ref 20.5–51.1)
LYMPHOCYTES # BLD AUTO: 2.3 K/UL — SIGNIFICANT CHANGE UP (ref 1.2–3.4)
MAGNESIUM SERPL-MCNC: 1.6 MG/DL — LOW (ref 1.8–2.4)
MCHC RBC-ENTMCNC: 33.6 G/DL — SIGNIFICANT CHANGE UP (ref 32–37)
MCHC RBC-ENTMCNC: 33.6 PG — HIGH (ref 27–31)
MCV RBC AUTO: 100 FL — HIGH (ref 81–99)
MONOCYTES # BLD AUTO: 1.29 K/UL — HIGH (ref 0.1–0.6)
MONOCYTES NFR BLD AUTO: 8.6 % — SIGNIFICANT CHANGE UP (ref 1.7–9.3)
NEUTROPHILS # BLD AUTO: 11.14 K/UL — HIGH (ref 1.4–6.5)
NEUTROPHILS NFR BLD AUTO: 74.1 % — SIGNIFICANT CHANGE UP (ref 42.2–75.2)
NRBC # BLD: 0 /100 WBCS — SIGNIFICANT CHANGE UP (ref 0–0)
PHOSPHATE SERPL-MCNC: 4.1 MG/DL — SIGNIFICANT CHANGE UP (ref 2.1–4.9)
PLATELET # BLD AUTO: 366 K/UL — SIGNIFICANT CHANGE UP (ref 130–400)
POTASSIUM SERPL-MCNC: 4.2 MMOL/L — SIGNIFICANT CHANGE UP (ref 3.5–5)
POTASSIUM SERPL-SCNC: 4.2 MMOL/L — SIGNIFICANT CHANGE UP (ref 3.5–5)
PROT SERPL-MCNC: 6.2 G/DL — SIGNIFICANT CHANGE UP (ref 6–8)
RBC # BLD: 3.6 M/UL — LOW (ref 4.2–5.4)
RBC # FLD: 12.3 % — SIGNIFICANT CHANGE UP (ref 11.5–14.5)
SODIUM SERPL-SCNC: 139 MMOL/L — SIGNIFICANT CHANGE UP (ref 135–146)
WBC # BLD: 15.02 K/UL — HIGH (ref 4.8–10.8)
WBC # FLD AUTO: 15.02 K/UL — HIGH (ref 4.8–10.8)

## 2020-01-30 PROCEDURE — 99233 SBSQ HOSP IP/OBS HIGH 50: CPT

## 2020-01-30 RX ORDER — ACETAMINOPHEN 500 MG
650 TABLET ORAL ONCE
Refills: 0 | Status: COMPLETED | OUTPATIENT
Start: 2020-01-30 | End: 2020-01-30

## 2020-01-30 RX ADMIN — PANTOPRAZOLE SODIUM 40 MILLIGRAM(S): 20 TABLET, DELAYED RELEASE ORAL at 05:16

## 2020-01-30 RX ADMIN — Medication 650 MILLIGRAM(S): at 14:58

## 2020-01-30 RX ADMIN — Medication 25 MICROGRAM(S): at 05:16

## 2020-01-30 RX ADMIN — ERTAPENEM SODIUM 120 MILLIGRAM(S): 1 INJECTION, POWDER, LYOPHILIZED, FOR SOLUTION INTRAMUSCULAR; INTRAVENOUS at 17:28

## 2020-01-30 RX ADMIN — SODIUM CHLORIDE 75 MILLILITER(S): 9 INJECTION, SOLUTION INTRAVENOUS at 14:58

## 2020-01-30 RX ADMIN — Medication 650 MILLIGRAM(S): at 16:39

## 2020-01-30 NOTE — CONSULT NOTE ADULT - SUBJECTIVE AND OBJECTIVE BOX
KIM WEISS  65y, Female  Allergy: No Known Allergies      CHIEF COMPLAINT: Diverticulitis (2020 19:37)      HPI:  65 yo F with PMHx of Hypothyroidism, Recurrent Diverticulitis, recently admitted at [facility] and discharged on 2020 for Diverticulitis failed on outpatient antibiotics, presented with complaint of dull, at times radiating to back left lower quadrant abdominal pain associated with decreased appetite since , pain currently 8/10, although patient appears comfortable. Patient was seen by GI yesterday, reported to have constipation. Patient denies fever, chills, nausea, vomiting, shortness of breath, last bowel movement this morning, positive flatus. (2020 19:37)    FAMILY HISTORY:  FH: myocardial infarction: mother  FH: diabetes mellitus: mother    PAST MEDICAL & SURGICAL HISTORY:  Diverticulitis  Hypothyroidism  H/O tubal ligation    FSH - not relevant   Substance Use (  ) never used  (  ) IVDU (  ) Other:  Tobacco Usage:  (   ) never smoked   (   ) former smoker   (   ) current smoker   Alcohol Usage: (   ) social  (   ) daily use (   ) denies  Sexual History:       ROS  10 system review - abdal discomfort     VITALS:  T(F): 99.6, Max: 99.6 (20 @ 05:21)  HR: 84  BP: 99/53  RR: 16Vital Signs Last 24 Hrs  T(C): 37.6 (2020 05:21), Max: 37.6 (2020 05:21)  T(F): 99.6 (2020 05:21), Max: 99.6 (2020 05:21)  HR: 84 (2020 05:21) (81 - 115)  BP: 99/53 (2020 05:21) (99/53 - 126/59)  BP(mean): --  RR: 16 (2020 05:21) (16 - 19)  SpO2: 98% (2020 20:52) (98% - 99%)    PHYSICAL EXAM:  Gen: NAD, resting in bed  HEENT: Normocephalic, atraumatic  Neck: supple, no lymphadenopathy  CV:s1 s 2 +   Lungs: clear   Abdomen: Soft, BS present. mildly tender lower abdomen   Ext: Warm, well perfused  Neuro: non focal, awake  Skin: no rash, no erythema    TESTS & MEASUREMENTS:                        13.8   21.09 )-----------( 394      ( 2020 14:05 )             41.0         138  |  98  |  10  ----------------------------<  95  4.6   |  24  |  0.9    Ca    10.0      2020 14:05    TPro  7.7  /  Alb  4.4  /  TBili  0.5  /  DBili  x   /  AST  18  /  ALT  <5  /  AlkPhos  93      eGFR if Non African American: 66 mL/min/1.73M2 (20 @ 14:05)  eGFR if African American: 77 mL/min/1.73M2 (20 @ 14:05)    LIVER FUNCTIONS - ( 2020 14:05 )  Alb: 4.4 g/dL / Pro: 7.7 g/dL / ALK PHOS: 93 U/L / ALT: <5 U/L / AST: 18 U/L / GGT: x           Urinalysis Basic - ( 2020 14:05 )    Color: Yellow / Appearance: Clear / S.015 / pH: x  Gluc: x / Ketone: 40  / Bili: Negative / Urobili: 0.2 mg/dL   Blood: x / Protein: Negative mg/dL / Nitrite: Negative   Leuk Esterase: Trace / RBC: Negative / WBC 3-5 /HPF   Sq Epi: x / Non Sq Epi: Few /HPF / Bacteria: x              INFECTIOUS DISEASES TESTING      RADIOLOGY & ADDITIONAL TESTS:  I have personally reviewed the last Chest xray  CXR      CT  CT Abdomen and Pelvis w/ IV Cont:   EXAM:  CT ABDOMEN AND PELVIS IC            PROCEDURE DATE:  2020            INTERPRETATION:  REASON FOR EXAM / CLINICAL STATEMENT: LLQ pain    TECHNIQUE: Contiguous axial CT images were obtained from the lower chest to the pubic symphysis with intravenous contrast.   Reformatted images in the coronal and sagittal planes were acquired.    COMPARISON CT:    OTHER STUDIES USED FOR CORRELATION: None.         FINDINGS    LOWER CHEST:  Subsegmental atelectasis or fibrosis is noted in the right middle lobe, otherwise the lung bases are clear. No pleural or pericardial effusion.    HEPATIC: The liver is normal in appearance with no evidence of mass or bile duct dilatation.      BILIARY: No calcified gallstones are noted.    SPLEEN: Unremarkable.        PANCREAS: Diffuse calcifications are again noted throughout the pancreas compatible with chronic calcific pancreatitis.    ADRENAL GLANDS: Unremarkable.        KIDNEYS: No evidence of hydronephrosis, calcified stones, or solid mass.     ABDOMINOPELVIC NODES: Unremarkable.    PELVIC ORGANS: No evidence of pelvic mass or lymphadenopathy.        PERITONEUM/MESENTERY/BOWEL: Colonic diverticulosis and diverticulitis is again noted. Compared with the previous study there is increased wall thickening around a diverticulum seen along the posterior margin of the sigmoid colon. (Series 2, image [id number]. There is increased gas and fluid within this diverticulum. The findings are compatible with an intramural abscess in this location. Inflammatory changes are again seen in the pericolic fat.      BONES/SOFT TISSUES: Degenerative changes of the spine are noted.    OTHER:   Vascular calcifications are noted with no evidence of abdominal aortic aneurysm.      IMPRESSION: Colonic diverticulosis and diverticulitis is again noted. Compared with the previous study there is increased wall thickening around a diverticulum seen along the posterior margin of the sigmoid colon. (Series 2, image 55-59. There is increased gas and fluid within this diverticulum. The findings are compatible with an intramural abscess in this location.                      ANGELA LARSON M.D., ATTENDING RADIOLOGIST  This document has been electronically signed. 2020  4:49PM             (20 @ 16:29)      CARDIOLOGY TESTING      MEDICATIONS  chlorhexidine 4% Liquid 1  ertapenem  IVPB 1000  lactated ringers. 1000  levothyroxine 25  pantoprazole    Tablet 40      ANTIBIOTICS:  ertapenem  IVPB 1000 milliGRAM(s) IV Intermittent every 24 hours

## 2020-01-30 NOTE — PROGRESS NOTE ADULT - SUBJECTIVE AND OBJECTIVE BOX
S; Feels slightly better, LLQ pain is 8/10; feels rectal pressure, no BM yet but minimal flatus  O; Vital Signs Last 24 Hrs  T(C): 37.6 (2020 05:21), Max: 37.6 (2020 05:21)  T(F): 99.6 (2020 05:21), Max: 99.6 (2020 05:21)  HR: 84 (2020 05:21) (81 - 115)  BP: 99/53 (2020 05:21) (99/53 - 126/59)  BP(mean): --  RR: 16 (2020 05:21) (16 - 19)  SpO2: 98% (2020 20:52) (98% - 99%)    EXAM:  gen: nontoxic appearing  abd: soft, ND, mild LLQ tenderness    Labs:  CAPILLARY BLOOD GLUCOSE                              12.1   15.02 )-----------( 366      ( 2020 06:57 )             36.0       Auto Immature Granulocyte %: 0.5 % (20 @ 06:57)  Auto Neutrophil %: 74.1 % (20 @ 06:57)  Auto Immature Granulocyte %: 0.4 % (20 @ 14:05)  Auto Neutrophil %: 80.8 % (20 @ 14:05)        138  |  98  |  10  ----------------------------<  95  4.6   |  24  |  0.9      Calcium, Total Serum: 10.0 mg/dL (20 @ 14:05)      LFTs:             7.7  | 0.5  | 18       ------------------[93      ( 2020 14:05 )  4.4  | x    | <5          Lipase:x      Amylase:x             Coags:        Urinalysis Basic - ( 2020 14:05 )    Color: Yellow / Appearance: Clear / S.015 / pH: x  Gluc: x / Ketone: 40  / Bili: Negative / Urobili: 0.2 mg/dL   Blood: x / Protein: Negative mg/dL / Nitrite: Negative   Leuk Esterase: Trace / RBC: Negative / WBC 3-5 /HPF   Sq Epi: x / Non Sq Epi: Few /HPF / Bacteria: x

## 2020-01-30 NOTE — CONSULT NOTE ADULT - PROBLEM SELECTOR RECOMMENDATION 9
acute infection   prior recent abx   tolerating clear fluids   will need Iv Ertapenem 1 g Q24 - 3 weeks     follow up with GI / surgical sx - ? bowel resection indicated  pt refuses to follow up with ID Service out pt   Recall if needed

## 2020-01-30 NOTE — PROGRESS NOTE ADULT - ASSESSMENT
66yo F here with sigmoid diverticulitis with intramural abscess failing outpatient antibiotic    NPO  serial abdominal exams, no peritoneal signs at this time  IV abx per ID recs  GI eval- [facility]  appreciated surgical f/u  would try bowel rest in light of failed outpatient treatment, nutrition services for PPN if patient agreeable  doubt intramural abscess would be amenable for drainage, but will reach out to IR to review CT imaging      #Progress Note Handoff    Pending (specify):  Consults_________, Tests________, Test Results_______, Other__plan as above_______    Family discussion: plan as above discussed with patient    Disposition:  Unknown at this time________

## 2020-01-30 NOTE — CONSULT NOTE ADULT - ASSESSMENT
Recurremt diverticulitis       REC:  Comtinue present amtibiotcs           Rmtual colo antibiotic  If aat all possible do a colonoscopy post Rx  Strongly consider surgical resection of the sgmoid colon imvolved when the erea has cooled down   Pt requesting to see  Dr Diaz or  Dr Little for surgical opinion

## 2020-01-30 NOTE — CONSULT NOTE ADULT - SUBJECTIVE AND OBJECTIVE BOX
Chief Complaint: Patient is a 65y old  Female who presents with a chief complaint of Diverticulitis (30 Jan 2020 15:56)      HPI:  Pt admiitted originally in Nov for acute diverticulitis and has beem readmitted several timrs in the last 2 mos with recurrence of sx.  She has been of at least 3 more courses but she returns now wor LLq pain wbc 21,000.      Medications:  chlorhexidine 4% Liquid 1 Application(s) Topical <User Schedule>  ertapenem  IVPB 1000 milliGRAM(s) IV Intermittent every 24 hours  lactated ringers. 1000 milliLiter(s) IV Continuous <Continuous>  levothyroxine 25 MICROGram(s) Oral daily  morphine  - Injectable 1 milliGRAM(s) IV Push every 6 hours PRN  pantoprazole    Tablet 40 milliGRAM(s) Oral before breakfast      PMHX/PSHX:  Diverticulitis  Hypothyroidism  H/O tubal ligation      Family history:  FH: myocardial infarction  FH: diabetes mellitus      Social History:     Allergies:  No Known Allergies        Review of Systems:  General:  No wt loss, fevers, chills, night sweats, fatigue or pruritis.  Eyes:  Good vision, no reported pain or redness.  ENT:  No sore throat, pain, runny nose, or difficulty swallowing  CV:  No pain, palpitations, hypo/hypertension  Resp:  No dyspnea, cough, tachypnea, wheezing  GI:  No pain, nausea, vomiting, dysphagia, heartburn, diarrhea, constipation, or weight loss. , No rectal bleeding, tarry stools, or hematemesis.  :  No pain, bleeding/discharges, incontinence, nocturia  Musculoskeletal:  No pain, weakness or fasciculations.  Neuro:  No weakness, tingling, memory problems or paresthesias  Psych:  No fatigue, insomnia, mood problems, depression  Endocrine:  No polyuria, polydipsia, cold/heat intolerance  Heme:  No petechiae, ecchymosis, easy bruisability  Skin:  No rash, pruritis, tattoos, scars, or edema      PHYSICAL EXAM:   Vital Signs:  Vital Signs Last 24 Hrs  T(C): 36.8 (30 Jan 2020 14:24), Max: 37.6 (30 Jan 2020 05:21)  T(F): 98.3 (30 Jan 2020 14:24), Max: 99.6 (30 Jan 2020 05:21)  HR: 89 (30 Jan 2020 14:24) (81 - 99)  BP: 144/62 (30 Jan 2020 14:24) (99/53 - 144/62)  BP(mean): --  RR: 16 (30 Jan 2020 14:24) (16 - 19)  SpO2: 98% (29 Jan 2020 20:52) (98% - 98%)  Daily Height in cm: 165.1 (29 Jan 2020 22:22)    Daily     T(C): 36.8 (01-30-20 @ 14:24), Max: 37.6 (01-30-20 @ 05:21)  HR: 89 (01-30-20 @ 14:24) (81 - 99)  BP: 144/62 (01-30-20 @ 14:24) (99/53 - 144/62)  RR: 16 (01-30-20 @ 14:24) (16 - 19)  SpO2: 98% (01-29-20 @ 20:52) (98% - 98%)    GENERAL:  Appears stated age, well-groomed, well-nourished, no distress  HEENT:  Conjunctivae clear and pink, no thyromegaly, nodules, adenopathy, no JVD, sclera -anicteric  CHEST:  Full & symmetric excursion, no increased effort, breath sounds clear  HEART:  Regular rhythm, S1, S2, no murmur/rub/S3/S4, no abdominal bruit, no edema  ABDOMEN:  Soft, -tender LLQ, non-distended, normoactive bowel sounds,  no masses ,no hepato-splenomegaly, no signs of chronic liver disease  EXTEREMITIES:  no cyanosis,clubbing or edema  SKIN:  No rash/erythema/ecchymoses/petechiae/wounds/abscess/warm/dry  NEURO:  Alert, oriented, no asterixis, no tremor, no encephalopathy    LABS:                        12.1   15.02 )-----------( 366      ( 30 Jan 2020 06:57 ) admitted [facility] [id number] nwbc             36.0     01-30    139  |  98  |  9<L>  ----------------------------<  68<L>  4.2   |  21  |  0.7    Ca    9.1      30 Jan 2020 06:57  Phos  4.1     01-30  Mg     1.6     01-30    TPro  6.2  /  Alb  3.7  /  TBili  0.5  /  DBili  x   /  AST  10  /  ALT  <5  /  AlkPhos  87  01-30    LIVER FUNCTIONS - ( 30 Jan 2020 06:57 )  Alb: 3.7 g/dL / Pro: 6.2 g/dL / ALK PHOS: 87 U/L / ALT: <5 U/L / AST: 10 U/L / GGT: x                   Imaging:      Assessment and Plan:

## 2020-01-30 NOTE — PROGRESS NOTE ADULT - ASSESSMENT
66 yo female with diverticulitis and poss intramural abscess.          Plan:  - no acute surgical intervention  - NPO, IVF: consider clear liquids once pain/WBC's more improved  - serial abd exams  - f?up labs  - Per ID: continue  IV Ertapenem x 3 weeks - if responds medically ideally she should have an extended treatment and go for elective colon resection while still on antibx. She will need colonoscopy prior.  - F/U GI eval with Dr Aranda    Pt seen & examined with Dr. Ferro 66 yo female with diverticulitis and poss intramural abscess.          Plan:  - no acute surgical intervention  - NPO, IVF: consider clear liquids once pain/WBC's more improved  - serial abd exams  - f/up labs  - Per ID: continue  IV Ertapenem x 3 weeks - if responds medically ideally she should have an extended treatment and go for elective colon resection while still on antibx. She will need colonoscopy prior.  - F/U GI eval with Dr Aranda    Pt seen & examined with Dr. Ferro

## 2020-01-30 NOTE — PROGRESS NOTE ADULT - SUBJECTIVE AND OBJECTIVE BOX
KIM WEISS  65y  Female      Patient is a 65y old  Female who presents with a chief complaint of Diverticulitis (2020 10:02)      INTERVAL HPI/OVERNIGHT EVENTS: last bm yesterday, painful but formed. c/o LLQ abdominal pain today ongoing. no n/v      REVIEW OF SYSTEMS:  as above  All other review of systems negative    T(C): 36.8 (20 @ 14:24), Max: 37.6 (20 @ 05:21)  HR: 89 (20 @ 14:24) (81 - 99)  BP: 144/62 (20 @ 14:24) (99/53 - 144/62)  RR: 16 (20 @ 14:24) (16 - 19)  SpO2: 98% (20 @ 20:52) (98% - 98%)  Wt(kg): --Vital Signs Last 24 Hrs  T(C): 36.8 (2020 14:24), Max: 37.6 (2020 05:21)  T(F): 98.3 (2020 14:24), Max: 99.6 (2020 05:21)  HR: 89 (2020 14:24) (81 - 99)  BP: 144/62 (2020 14:24) (99/53 - 144/62)  BP(mean): --  RR: 16 (2020 14:24) (16 - 19)  SpO2: 98% (2020 20:52) (98% - 98%)      20 @ 07:01  -  20 @ 07:00  --------------------------------------------------------  IN: 0 mL / OUT: 1200 mL / NET: -1200 mL        PHYSICAL EXAM:  GENERAL: NAD  PSYCH: no agitation, baseline mentation  NERVOUS SYSTEM:  Alert & Oriented X3, no new focal deficits  PULMONARY: Clear to percussion bilaterally; No rales, rhonchi, wheezing, or rubs  CARDIOVASCULAR: Regular rate and rhythm; No murmurs, rubs, or gallops  GI:  LLQ + TTP, no rebound/ guarding, +BS  EXTREMITIES:  2+ Peripheral Pulses, No clubbing, cyanosis, or edema    Consultant(s) Notes Reviewed:  [x ] YES  [ ] NO    Discussed with Consultants/Other Providers [ x] YES     LABS                          12.1   15.02 )-----------( 366      ( 2020 06:57 )             36.0         139  |  98  |  9<L>  ----------------------------<  68<L>  4.2   |  21  |  0.7    Ca    9.1      2020 06:57  Phos  4.1       Mg     1.6         TPro  6.2  /  Alb  3.7  /  TBili  0.5  /  DBili  x   /  AST  10  /  ALT  <5  /  AlkPhos  87        Urinalysis Basic - ( 2020 14:05 )    Color: Yellow / Appearance: Clear / S.015 / pH: x  Gluc: x / Ketone: 40  / Bili: Negative / Urobili: 0.2 mg/dL   Blood: x / Protein: Negative mg/dL / Nitrite: Negative   Leuk Esterase: Trace / RBC: Negative / WBC 3-5 /HPF   Sq Epi: x / Non Sq Epi: Few /HPF / Bacteria: x        Lactate Trend        CAPILLARY BLOOD GLUCOSE            RADIOLOGY & ADDITIONAL TESTS:    Imaging Personally Reviewed:  [ ] YES  [ ] NO    HEALTH ISSUES - PROBLEM Dx:  Hypothyroidism: Hypothyroidism  Diverticulitis of large intestine with abscess without bleeding: Diverticulitis of large intestine with abscess without bleeding

## 2020-01-31 LAB
ALBUMIN SERPL ELPH-MCNC: 3.5 G/DL — SIGNIFICANT CHANGE UP (ref 3.5–5.2)
ALP SERPL-CCNC: 74 U/L — SIGNIFICANT CHANGE UP (ref 30–115)
ALT FLD-CCNC: <5 U/L — SIGNIFICANT CHANGE UP (ref 0–41)
ANION GAP SERPL CALC-SCNC: 22 MMOL/L — HIGH (ref 7–14)
AST SERPL-CCNC: 10 U/L — SIGNIFICANT CHANGE UP (ref 0–41)
BILIRUB SERPL-MCNC: 0.4 MG/DL — SIGNIFICANT CHANGE UP (ref 0.2–1.2)
BUN SERPL-MCNC: 10 MG/DL — SIGNIFICANT CHANGE UP (ref 10–20)
CALCIUM SERPL-MCNC: 9 MG/DL — SIGNIFICANT CHANGE UP (ref 8.5–10.1)
CHLORIDE SERPL-SCNC: 99 MMOL/L — SIGNIFICANT CHANGE UP (ref 98–110)
CO2 SERPL-SCNC: 17 MMOL/L — SIGNIFICANT CHANGE UP (ref 17–32)
CREAT SERPL-MCNC: 0.7 MG/DL — SIGNIFICANT CHANGE UP (ref 0.7–1.5)
GLUCOSE BLDC GLUCOMTR-MCNC: 66 MG/DL — LOW (ref 70–99)
GLUCOSE BLDC GLUCOMTR-MCNC: 77 MG/DL — SIGNIFICANT CHANGE UP (ref 70–99)
GLUCOSE SERPL-MCNC: 58 MG/DL — LOW (ref 70–99)
HCT VFR BLD CALC: 35.3 % — LOW (ref 37–47)
HGB BLD-MCNC: 12 G/DL — SIGNIFICANT CHANGE UP (ref 12–16)
MCHC RBC-ENTMCNC: 33.1 PG — HIGH (ref 27–31)
MCHC RBC-ENTMCNC: 34 G/DL — SIGNIFICANT CHANGE UP (ref 32–37)
MCV RBC AUTO: 97.5 FL — SIGNIFICANT CHANGE UP (ref 81–99)
NRBC # BLD: 0 /100 WBCS — SIGNIFICANT CHANGE UP (ref 0–0)
PLATELET # BLD AUTO: 397 K/UL — SIGNIFICANT CHANGE UP (ref 130–400)
POTASSIUM SERPL-MCNC: 4.3 MMOL/L — SIGNIFICANT CHANGE UP (ref 3.5–5)
POTASSIUM SERPL-SCNC: 4.3 MMOL/L — SIGNIFICANT CHANGE UP (ref 3.5–5)
PROT SERPL-MCNC: 5.9 G/DL — LOW (ref 6–8)
RBC # BLD: 3.62 M/UL — LOW (ref 4.2–5.4)
RBC # FLD: 12 % — SIGNIFICANT CHANGE UP (ref 11.5–14.5)
SODIUM SERPL-SCNC: 138 MMOL/L — SIGNIFICANT CHANGE UP (ref 135–146)
TRIGL SERPL-MCNC: 172 MG/DL — HIGH (ref 10–149)
WBC # BLD: 14.01 K/UL — HIGH (ref 4.8–10.8)
WBC # FLD AUTO: 14.01 K/UL — HIGH (ref 4.8–10.8)

## 2020-01-31 PROCEDURE — 99233 SBSQ HOSP IP/OBS HIGH 50: CPT

## 2020-01-31 RX ORDER — I.V. FAT EMULSION 20 G/100ML
1.7 EMULSION INTRAVENOUS
Qty: 100.47 | Refills: 0 | Status: DISCONTINUED | OUTPATIENT
Start: 2020-01-31 | End: 2020-01-31

## 2020-01-31 RX ORDER — ELECTROLYTE SOLUTION,INJ
1 VIAL (ML) INTRAVENOUS
Refills: 0 | Status: DISCONTINUED | OUTPATIENT
Start: 2020-01-31 | End: 2020-01-31

## 2020-01-31 RX ORDER — ACETAMINOPHEN 500 MG
650 TABLET ORAL EVERY 6 HOURS
Refills: 0 | Status: COMPLETED | OUTPATIENT
Start: 2020-01-31 | End: 2020-02-01

## 2020-01-31 RX ORDER — DEXTROSE 50 % IN WATER 50 %
15 SYRINGE (ML) INTRAVENOUS ONCE
Refills: 0 | Status: COMPLETED | OUTPATIENT
Start: 2020-01-31 | End: 2020-01-31

## 2020-01-31 RX ADMIN — Medication 15 GRAM(S): at 12:13

## 2020-01-31 RX ADMIN — PANTOPRAZOLE SODIUM 40 MILLIGRAM(S): 20 TABLET, DELAYED RELEASE ORAL at 05:21

## 2020-01-31 RX ADMIN — Medication 70 EACH: at 20:22

## 2020-01-31 RX ADMIN — I.V. FAT EMULSION 31.4 GM/KG/DAY: 20 EMULSION INTRAVENOUS at 20:31

## 2020-01-31 RX ADMIN — Medication 650 MILLIGRAM(S): at 23:31

## 2020-01-31 RX ADMIN — ERTAPENEM SODIUM 120 MILLIGRAM(S): 1 INJECTION, POWDER, LYOPHILIZED, FOR SOLUTION INTRAMUSCULAR; INTRAVENOUS at 18:10

## 2020-01-31 RX ADMIN — Medication 25 MICROGRAM(S): at 05:21

## 2020-01-31 RX ADMIN — Medication 650 MILLIGRAM(S): at 10:15

## 2020-01-31 NOTE — CONSULT NOTE ADULT - SUBJECTIVE AND OBJECTIVE BOX
PRELIMINARY NOTE  65 yo F with PMHx of Hypothyroidism, Recurrent Diverticulitis, recently admitted at [facility] and discharged on 01/03/2020 for Diverticulitis failed on outpatient antibiotics, presented with complaint of dull, at times radiating to back left lower quadrant abdominal pain associated with decreased appetite since Sunday, pain currently 8/10, although patient appears comfortable. Patient was seen by GI yesterday, reported to have constipation. Patient denies fever, chills, nausea, vomiting, shortness of breath, last bowel movement this morning, positive flatus.   pt to be kept NPO - trial of bowel rest and Abx. MD reports + abd pain and tenderness with ? worse pain pc.    < from: CT Abdomen and Pelvis w/ IV Cont (01.29.20 @ 16:29) >  IMPRESSION: Colonic diverticulosis and diverticulitis is again noted. Compared with the previous study there is increased wall thickening around a diverticulum seen along the posterior margin of the sigmoid colon. (Series 2, image [id number]. There is increased gas and fluid within this diverticulum. The findings are compatible with an intramural abscess in this location.  < end of copied text >    Vital Signs Last 24 Hrs  T(C): 35.1 (31 Jan 2020 14:15), Max: 38.7 (31 Jan 2020 06:17)  T(F): 95.2 (31 Jan 2020 14:15), Max: 101.6 (31 Jan 2020 06:17)  HR: 65 (31 Jan 2020 14:15) (65 - 77)  BP: 109/58 (31 Jan 2020 14:15) (109/58 - 121/58)  RR: 16 (31 Jan 2020 14:15) (16 - 18)  Drug Dosing Weight  Height (cm): 165.1 (29 Jan 2020 22:22)  Weight (kg): 59.1 (29 Jan 2020 22:22)  BMI (kg/m2): 21.7 (29 Jan 2020 22:22)  BSA (m2): 1.65 (29 Jan 2020 22:22)    MEDICATIONS  (STANDING):  acetaminophen   Tablet .. 650 milliGRAM(s) Oral every 6 hours  chlorhexidine 4% Liquid 1 Application(s) Topical <User Schedule>  ertapenem  IVPB 1000 milliGRAM(s) IV Intermittent every 24 hours  lactated ringers. 1000 milliLiter(s) (75 mL/Hr) IV Continuous <Continuous>  levothyroxine 25 MICROGram(s) Oral daily  protonix                        12.0   14.01 )-----------( 397      ( 31 Jan 2020 06:52 )             35.3   01-31    138  |  99  |  10  ----------------------------<  58<L>  4.3   |  17  |  0.7    Ca    9.0      31 Jan 2020 06:52  Phos  4.1     01-30  Mg     1.6     01-30  TPro  5.9<L>  /  Alb  3.5  /  TBili  0.4  /  DBili  x   /  AST  10  /  ALT  <5  /  AlkPhos  74  01-31  Triglycerides, Serum in AM (01.31.20 @ 06:52)    Triglycerides, Serum: 172 mg/dL 65 yo F with PMHx of Hypothyroidism, Recurrent Diverticulitis, recently admitted at [facility] and discharged on 01/03/2020 for Diverticulitis failed on outpatient antibiotics, presented with complaint of dull, at times radiating to back left lower quadrant abdominal pain associated with decreased appetite since Sunday, pain currently 8/10, although patient appears comfortable. Patient was seen by GI yesterday, reported to have constipation. Patient denies fever, chills, nausea, vomiting, shortness of breath, last bowel movement this morning, positive flatus.   pt to be kept NPO - trial of bowel rest and Abx. MD reports + abd pain and tenderness with ? worse pain pc.    < from: CT Abdomen and Pelvis w/ IV Cont (01.29.20 @ 16:29) >  IMPRESSION: Colonic diverticulosis and diverticulitis is again noted. Compared with the previous study there is increased wall thickening around a diverticulum seen along the posterior margin of the sigmoid colon. (Series 2, image [id number]. There is increased gas and fluid within this diverticulum. The findings are compatible with an intramural abscess in this location.  < end of copied text >    Vital Signs Last 24 Hrs  T(C): 35.1 (31 Jan 2020 14:15), Max: 38.7 (31 Jan 2020 06:17)  T(F): 95.2 (31 Jan 2020 14:15), Max: 101.6 (31 Jan 2020 06:17)  HR: 65 (31 Jan 2020 14:15) (65 - 77)  BP: 109/58 (31 Jan 2020 14:15) (109/58 - 121/58)  RR: 16 (31 Jan 2020 14:15) (16 - 18)  Drug Dosing Weight  Height (cm): 165.1 (29 Jan 2020 22:22)  Weight (kg): 59.1 (29 Jan 2020 22:22)  BMI (kg/m2): 21.7 (29 Jan 2020 22:22)  BSA (m2): 1.65 (29 Jan 2020 22:22)  A&O, skin turgor good  abd soft, local tenderness, slightly distended, pt has been NPO  peripheral iv access    MEDICATIONS  (STANDING):  acetaminophen   Tablet .. 650 milliGRAM(s) Oral every 6 hours  chlorhexidine 4% Liquid 1 Application(s) Topical <User Schedule>  ertapenem  IVPB 1000 milliGRAM(s) IV Intermittent every 24 hours  lactated ringers. 1000 milliLiter(s) (75 mL/Hr) IV Continuous <Continuous>  levothyroxine 25 MICROGram(s) Oral daily  protonix                        12.0   14.01 )-----------( 397      ( 31 Jan 2020 06:52 )             35.3   01-31    138  |  99  |  10  ----------------------------<  58<L>  4.3   |  17  |  0.7    Ca    9.0      31 Jan 2020 06:52  Phos  4.1     01-30  Mg     1.6     01-30  TPro  5.9<L>  /  Alb  3.5  /  TBili  0.4  /  DBili  x   /  AST  10  /  ALT  <5  /  AlkPhos  74  01-31  Triglycerides, Serum in AM (01.31.20 @ 06:52)    Triglycerides, Serum: 172 mg/dL

## 2020-01-31 NOTE — PROGRESS NOTE ADULT - ASSESSMENT
66yo F here with sigmoid diverticulitis with intramural abscess failing outpatient antibiotic    NPO  serial abdominal exams, no peritoneal signs at this time  IV abx per ID recs  GI d/w Dr. Aranda  appreciated surgical f/u  would try bowel rest in light of failed outpatient treatment, nutrition services for PPN if patient agreeable  doubt intramural abscess would be amenable for drainage, but will reach out to IR to review CT imaging      #Progress Note Handoff    Pending (specify):  Consults_________, Tests________, Test Results_______, Other__plan as above_______    Family discussion: plan as above discussed with patient    Disposition:  Unknown at this time________

## 2020-01-31 NOTE — PROGRESS NOTE ADULT - SUBJECTIVE AND OBJECTIVE BOX
KIM WEISS  65y  Female      Patient is a 65y old  Female who presents with a chief complaint of Diverticulitis (31 Jan 2020 14:42)      INTERVAL HPI/OVERNIGHT EVENTS: ongoing LLQ abdominal pain, no n/v, no bm today      REVIEW OF SYSTEMS:  as above  All other review of systems negative    T(C): 35.1 (01-31-20 @ 14:15), Max: 38.7 (01-31-20 @ 06:17)  HR: 65 (01-31-20 @ 14:15) (65 - 77)  BP: 109/58 (01-31-20 @ 14:15) (109/58 - 121/58)  RR: 16 (01-31-20 @ 14:15) (16 - 18)  SpO2: --  Wt(kg): --Vital Signs Last 24 Hrs  T(C): 35.1 (31 Jan 2020 14:15), Max: 38.7 (31 Jan 2020 06:17)  T(F): 95.2 (31 Jan 2020 14:15), Max: 101.6 (31 Jan 2020 06:17)  HR: 65 (31 Jan 2020 14:15) (65 - 77)  BP: 109/58 (31 Jan 2020 14:15) (109/58 - 121/58)  BP(mean): --  RR: 16 (31 Jan 2020 14:15) (16 - 18)  SpO2: --        PHYSICAL EXAM:  GENERAL: NAD  PSYCH: no agitation, baseline mentation  NERVOUS SYSTEM:  Alert & Oriented X3, no new focal deficits  PULMONARY: Clear to percussion bilaterally; No rales, rhonchi, wheezing, or rubs  CARDIOVASCULAR: Regular rate and rhythm; No murmurs, rubs, or gallops  GI: LLQ tender to deep palpation, no rebound/ guarding  EXTREMITIES:  2+ Peripheral Pulses, No clubbing, cyanosis, or edema  SKIN dry    Consultant(s) Notes Reviewed:  [x ] YES  [ ] NO    Discussed with Consultants/Other Providers [ x] YES     LABS                          12.0   14.01 )-----------( 397      ( 31 Jan 2020 06:52 )             35.3     01-31    138  |  99  |  10  ----------------------------<  58<L>  4.3   |  17  |  0.7    Ca    9.0      31 Jan 2020 06:52  Phos  4.1     01-30  Mg     1.6     01-30    TPro  5.9<L>  /  Alb  3.5  /  TBili  0.4  /  DBili  x   /  AST  10  /  ALT  <5  /  AlkPhos  74  01-31          Lactate Trend        CAPILLARY BLOOD GLUCOSE      POCT Blood Glucose.: 77 mg/dL (31 Jan 2020 15:35)        RADIOLOGY & ADDITIONAL TESTS:    Imaging Personally Reviewed:  [ ] YES  [ ] NO    HEALTH ISSUES - PROBLEM Dx:  Hypothyroidism: Hypothyroidism  Diverticulitis of large intestine with abscess without bleeding: Diverticulitis of large intestine with abscess without bleeding

## 2020-01-31 NOTE — PROGRESS NOTE ADULT - ASSESSMENT
Abdominal pain improving  Diverticulitis with abscess    Plan   NPO except meds  Continue IV antibiotics  PPN today  Monitor CBC  Colonoscopy at a later date  Will follow

## 2020-01-31 NOTE — PROGRESS NOTE ADULT - ASSESSMENT
66 yo female with diverticulitis and poss intramural abscess. Now with fever x 1 this AM and some increased pain overnight    Plan:  - continue NPO, IVF  - serial abd exams  - trend  labs/WBC's   - If fevers/pain persists   - Per ID: continue  IV Ertapenem x 3 weeks - if responds medically ideally she should have an extended treatment and go for elective colon resection while still on antibx. She will need colonoscopy prior.  - F/U GI eval with Dr Aranda    Pt seen & examined with Dr. Ferro

## 2020-01-31 NOTE — PROGRESS NOTE ADULT - SUBJECTIVE AND OBJECTIVE BOX
HPI:  65 yo F with PMHx of Hypothyroidism, Recurrent Diverticulitis, recently admitted at [facility] and discharged on 01/03/2020 for Diverticulitis failed on outpatient antibiotics, presented with complaint of dull, at times radiating to back left lower quadrant abdominal pain associated with decreased appetite since Sunday, pain currently 8/10, although patient appears comfortable. Patient was seen by GI yesterday, reported to have constipation. Patient denies fever, chills, nausea, vomiting, shortness of breath, last bowel movement this morning, positive flatus. (29 Jan 2020 19:37)    States feels better today.  The pain is less.  Multiple episodes since 11/19    PAST MEDICAL & SURGICAL HISTORY:  Diverticulitis  Hypothyroidism  H/O tubal ligation      REVIEW OF SYSTEMS:    CONSTITUTIONAL: No weakness, fevers or chills  EYES/ENT: No visual changes;  No vertigo or throat pain   NECK: No pain or stiffness  RESPIRATORY: No cough, wheezing, hemoptysis; No shortness of breath  CARDIOVASCULAR: No chest pain or palpitations  GASTROINTESTINAL: Abdominal pain. No nausea, vomiting, or hematemesis; No diarrhea or constipation. No melena or hematochezia.  GENITOURINARY: No dysuria, frequency or hematuria  NEUROLOGICAL: No numbness or weakness  SKIN: No itching, rashes      MEDICATIONS  (STANDING):  acetaminophen   Tablet .. 650 milliGRAM(s) Oral every 6 hours  chlorhexidine 4% Liquid 1 Application(s) Topical <User Schedule>  ertapenem  IVPB 1000 milliGRAM(s) IV Intermittent every 24 hours  fat emulsion (Plant Based) 20% Infusion 1.7 Gm/kG/Day (31.397 mL/Hr) IV Continuous <Continuous>  lactated ringers. 1000 milliLiter(s) (75 mL/Hr) IV Continuous <Continuous>  levothyroxine 25 MICROGram(s) Oral daily  Parenteral Nutrition - Adult 1 Each (70 mL/Hr) TPN Continuous <Continuous>    MEDICATIONS  (PRN):  morphine  - Injectable 1 milliGRAM(s) IV Push every 6 hours PRN Moderate Pain (4 - 6)      Allergies    No Known Allergies    Intolerances        SOCIAL HISTORY:    FAMILY HISTORY:  FH: myocardial infarction: mother  FH: diabetes mellitus: mother      Vital Signs Last 24 Hrs  T(C): 35.1 (31 Jan 2020 14:15), Max: 38.7 (31 Jan 2020 06:17)  T(F): 95.2 (31 Jan 2020 14:15), Max: 101.6 (31 Jan 2020 06:17)  HR: 65 (31 Jan 2020 14:15) (65 - 77)  BP: 109/58 (31 Jan 2020 14:15) (109/58 - 121/58)  BP(mean): --  RR: 16 (31 Jan 2020 14:15) (16 - 18)  SpO2: --    PHYSICAL EXAM:  GENERAL: NAD, well-developed, well nourished, looks stated age  HEAD:  Atraumatic, Normocephalic  EYES: EOMI, PERRLA, conjunctiva and sclera clear  NECK: Supple, No JVD, no bruits, no masses, no thyroid enlargement  ENMT: No tonsillar erythema, exudated or enlargement, moist mucous membranes  CHEST/LUNG: Clear to auscultation bilaterally; No rales, rhonchi or wheezing, no dullness to percussion  HEART: S1,S2 Regular rate and rhythm; No murmurs, rubs, or gallops  ABDOMEN: Soft, tender lower abdomen, nondistended, no rebound tenderness; No palpable masses, no hernias, no organomegaly; Bowel sounds present and normoactive  EXTREMITIES:  2+ peripheral pulses bilaterally and symmetrically, no clubbing, cyanosis, or edema  LYMPH: No lymphadenopathy  PSYCH: AAOx3, normal mood and affect  NEUROLOGY: non-focal, muscle strength 5/5 all extremities, DTRs 2+ symmetrically  SKIN: No rashes or lesions    LABS:                        12.0   14.01 )-----------( 397      ( 31 Jan 2020 06:52 )             35.3     01-31    138  |  99  |  10  ----------------------------<  58<L>  4.3   |  17  |  0.7    Ca    9.0      31 Jan 2020 06:52  Phos  4.1     01-30  Mg     1.6     01-30    TPro  5.9<L>  /  Alb  3.5  /  TBili  0.4  /  DBili  x   /  AST  10  /  ALT  <5  /  AlkPhos  74  01-31          RADIOLOGY & ADDITIONAL STUDIES:    CT diverticulitis with abscess

## 2020-01-31 NOTE — PROGRESS NOTE ADULT - SUBJECTIVE AND OBJECTIVE BOX
S; Pt repprts feeling feverish, warm, sweaty overnight with increased pain in LLQ. She had temp 101.6 this am, did not take tylenol, but now is afebrile - pain is also back to baseline. Passing more flatus, no BM, still c/o rectal pressure  O; Vital Signs Last 24 Hrs  T(C): 38.7 (2020 06:17), Max: 38.7 (2020 06:17)  T(F): 101.6 (2020 06:17), Max: 101.6 (2020 06:17)  HR: 77 (2020 06:17) (70 - 89)  BP: 112/57 (2020 06:17) (112/57 - 144/62)  BP(mean): --  RR: 18 (2020 06:17) (16 - 18)  SpO2: --     MEDICATIONS  (STANDING):  acetaminophen   Tablet .. 650 milliGRAM(s) Oral every 6 hours  chlorhexidine 4% Liquid 1 Application(s) Topical <User Schedule>  ertapenem  IVPB 1000 milliGRAM(s) IV Intermittent every 24 hours  lactated ringers. 1000 milliLiter(s) (75 mL/Hr) IV Continuous <Continuous>  levothyroxine 25 MICROGram(s) Oral daily  pantoprazole    Tablet 40 milliGRAM(s) Oral before breakfast    MEDICATIONS  (PRN):  morphine  - Injectable 1 milliGRAM(s) IV Push every 6 hours PRN Moderate Pain (4 - 6)    EXAM:  gen: nontoxic appearing  abd: soft, ND, (+) LLQ tenderness with local rebound; no peritoneal signs    Labs:                        12.0   14.01 )-----------( 397      ( 2020 06:52 )             35.3             138  |  99  |  10  ----------------------------<  58<L>  4.3   |  17  |  0.7      Calcium, Total Serum: 9.0 mg/dL (20 @ 06:52)      LFTs:             5.9  | 0.4  | 10       ------------------[74      ( 2020 06:52 )  3.5  | x    | <5          Lipase:x      Amylase:x        Urinalysis Basic - ( 2020 14:05 )    Color: Yellow / Appearance: Clear / S.015 / pH: x  Gluc: x / Ketone: 40  / Bili: Negative / Urobili: 0.2 mg/dL   Blood: x / Protein: Negative mg/dL / Nitrite: Negative   Leuk Esterase: Trace / RBC: Negative / WBC 3-5 /HPF   Sq Epi: x / Non Sq Epi: Few /HPF / Bacteria: x

## 2020-02-01 LAB
24R-OH-CALCIDIOL SERPL-MCNC: 24 NG/ML — LOW (ref 30–80)
ALBUMIN SERPL ELPH-MCNC: 3.6 G/DL — SIGNIFICANT CHANGE UP (ref 3.5–5.2)
ALP SERPL-CCNC: 74 U/L — SIGNIFICANT CHANGE UP (ref 30–115)
ALT FLD-CCNC: <5 U/L — SIGNIFICANT CHANGE UP (ref 0–41)
ANION GAP SERPL CALC-SCNC: 20 MMOL/L — HIGH (ref 7–14)
AST SERPL-CCNC: 10 U/L — SIGNIFICANT CHANGE UP (ref 0–41)
BASOPHILS # BLD AUTO: 0.05 K/UL — SIGNIFICANT CHANGE UP (ref 0–0.2)
BASOPHILS NFR BLD AUTO: 0.4 % — SIGNIFICANT CHANGE UP (ref 0–1)
BILIRUB SERPL-MCNC: <0.2 MG/DL — SIGNIFICANT CHANGE UP (ref 0.2–1.2)
BUN SERPL-MCNC: 9 MG/DL — LOW (ref 10–20)
CALCIUM SERPL-MCNC: 9 MG/DL — SIGNIFICANT CHANGE UP (ref 8.5–10.1)
CHLORIDE SERPL-SCNC: 97 MMOL/L — LOW (ref 98–110)
CO2 SERPL-SCNC: 21 MMOL/L — SIGNIFICANT CHANGE UP (ref 17–32)
CREAT SERPL-MCNC: 0.6 MG/DL — LOW (ref 0.7–1.5)
EOSINOPHIL # BLD AUTO: 0.32 K/UL — SIGNIFICANT CHANGE UP (ref 0–0.7)
EOSINOPHIL NFR BLD AUTO: 2.5 % — SIGNIFICANT CHANGE UP (ref 0–8)
GLUCOSE SERPL-MCNC: 90 MG/DL — SIGNIFICANT CHANGE UP (ref 70–99)
HCT VFR BLD CALC: 36.1 % — LOW (ref 37–47)
HGB BLD-MCNC: 12.5 G/DL — SIGNIFICANT CHANGE UP (ref 12–16)
IMM GRANULOCYTES NFR BLD AUTO: 0.5 % — HIGH (ref 0.1–0.3)
LYMPHOCYTES # BLD AUTO: 1.64 K/UL — SIGNIFICANT CHANGE UP (ref 1.2–3.4)
LYMPHOCYTES # BLD AUTO: 13 % — LOW (ref 20.5–51.1)
MAGNESIUM SERPL-MCNC: 1.8 MG/DL — SIGNIFICANT CHANGE UP (ref 1.8–2.4)
MCHC RBC-ENTMCNC: 33.3 PG — HIGH (ref 27–31)
MCHC RBC-ENTMCNC: 34.6 G/DL — SIGNIFICANT CHANGE UP (ref 32–37)
MCV RBC AUTO: 96.3 FL — SIGNIFICANT CHANGE UP (ref 81–99)
MONOCYTES # BLD AUTO: 0.74 K/UL — HIGH (ref 0.1–0.6)
MONOCYTES NFR BLD AUTO: 5.9 % — SIGNIFICANT CHANGE UP (ref 1.7–9.3)
NEUTROPHILS # BLD AUTO: 9.77 K/UL — HIGH (ref 1.4–6.5)
NEUTROPHILS NFR BLD AUTO: 77.7 % — HIGH (ref 42.2–75.2)
NRBC # BLD: 0 /100 WBCS — SIGNIFICANT CHANGE UP (ref 0–0)
PHOSPHATE SERPL-MCNC: 3.5 MG/DL — SIGNIFICANT CHANGE UP (ref 2.1–4.9)
PLATELET # BLD AUTO: 431 K/UL — HIGH (ref 130–400)
POTASSIUM SERPL-MCNC: 4.3 MMOL/L — SIGNIFICANT CHANGE UP (ref 3.5–5)
POTASSIUM SERPL-SCNC: 4.3 MMOL/L — SIGNIFICANT CHANGE UP (ref 3.5–5)
PROT SERPL-MCNC: 6.1 G/DL — SIGNIFICANT CHANGE UP (ref 6–8)
RBC # BLD: 3.75 M/UL — LOW (ref 4.2–5.4)
RBC # FLD: 11.8 % — SIGNIFICANT CHANGE UP (ref 11.5–14.5)
SODIUM SERPL-SCNC: 138 MMOL/L — SIGNIFICANT CHANGE UP (ref 135–146)
WBC # BLD: 12.58 K/UL — HIGH (ref 4.8–10.8)
WBC # FLD AUTO: 12.58 K/UL — HIGH (ref 4.8–10.8)

## 2020-02-01 PROCEDURE — 99233 SBSQ HOSP IP/OBS HIGH 50: CPT

## 2020-02-01 RX ORDER — I.V. FAT EMULSION 20 G/100ML
1.7 EMULSION INTRAVENOUS
Qty: 100.47 | Refills: 0 | Status: DISCONTINUED | OUTPATIENT
Start: 2020-02-01 | End: 2020-02-01

## 2020-02-01 RX ORDER — ELECTROLYTE SOLUTION,INJ
1 VIAL (ML) INTRAVENOUS
Refills: 0 | Status: DISCONTINUED | OUTPATIENT
Start: 2020-02-01 | End: 2020-02-01

## 2020-02-01 RX ADMIN — I.V. FAT EMULSION 31.4 GM/KG/DAY: 20 EMULSION INTRAVENOUS at 20:27

## 2020-02-01 RX ADMIN — ERTAPENEM SODIUM 120 MILLIGRAM(S): 1 INJECTION, POWDER, LYOPHILIZED, FOR SOLUTION INTRAMUSCULAR; INTRAVENOUS at 19:55

## 2020-02-01 RX ADMIN — Medication 25 MICROGRAM(S): at 05:01

## 2020-02-01 RX ADMIN — Medication 1 EACH: at 20:27

## 2020-02-01 RX ADMIN — Medication 650 MILLIGRAM(S): at 05:02

## 2020-02-01 RX ADMIN — Medication 650 MILLIGRAM(S): at 05:01

## 2020-02-01 NOTE — PROGRESS NOTE ADULT - ASSESSMENT
65F with diverticulitis & intramural abscess  1. Continue IV abx  2. F/U AM labs, if wbc trending down then consider starting clear liquid diet  3. Will follow 65F with diverticulitis & intramural abscess. Abdominal exam has improved since yesterday.  1. Continue IV abx  2. F/U AM labs, if wbc trending down then consider starting clear liquid diet  3. Will follow

## 2020-02-01 NOTE — PROGRESS NOTE ADULT - SUBJECTIVE AND OBJECTIVE BOX
KIM WEISS  65y  Female      Patient is a 65y old  Female who presents with a chief complaint of Diverticulitis (01 Feb 2020 08:55)      INTERVAL HPI/OVERNIGHT EVENTS: c/ o ongoing LLQ abdominal pain and intermittent rectal pressure. no bm. no n/v      REVIEW OF SYSTEMS:  as above  All other review of systems negative    T(C): 35.7 (02-01-20 @ 13:54), Max: 37.2 (01-31-20 @ 22:17)  HR: 75 (02-01-20 @ 13:54) (69 - 75)  BP: 139/65 (02-01-20 @ 13:54) (123/58 - 139/65)  RR: 18 (02-01-20 @ 13:54) (16 - 18)  SpO2: --  Wt(kg): --Vital Signs Last 24 Hrs  T(C): 35.7 (01 Feb 2020 13:54), Max: 37.2 (31 Jan 2020 22:17)  T(F): 96.2 (01 Feb 2020 13:54), Max: 99 (31 Jan 2020 22:17)  HR: 75 (01 Feb 2020 13:54) (69 - 75)  BP: 139/65 (01 Feb 2020 13:54) (123/58 - 139/65)  BP(mean): --  RR: 18 (01 Feb 2020 13:54) (16 - 18)  SpO2: --        PHYSICAL EXAM:  GENERAL: NAD  PSYCH: no agitation, baseline mentation  NERVOUS SYSTEM:  Alert & Oriented X3, no new focal deficits  PULMONARY: Clear to percussion bilaterally; No rales, rhonchi, wheezing, or rubs  CARDIOVASCULAR: Regular rate and rhythm; No murmurs, rubs, or gallops  GI: LLQ tender to deep palpation, no rebound/ guarding  EXTREMITIES:  2+ Peripheral Pulses, No clubbing, cyanosis, or edema    Consultant(s) Notes Reviewed:  [x ] YES  [ ] NO    Discussed with Consultants/Other Providers [ x] YES     LABS                          12.5   12.58 )-----------( 431      ( 01 Feb 2020 06:29 )             36.1     02-01    138  |  97<L>  |  9<L>  ----------------------------<  90  4.3   |  21  |  0.6<L>    Ca    9.0      01 Feb 2020 06:29  Phos  3.5     02-01  Mg     1.8     02-01    TPro  6.1  /  Alb  3.6  /  TBili  <0.2  /  DBili  x   /  AST  10  /  ALT  <5  /  AlkPhos  74  02-01          Lactate Trend        CAPILLARY BLOOD GLUCOSE      POCT Blood Glucose.: 77 mg/dL (31 Jan 2020 15:35)        RADIOLOGY & ADDITIONAL TESTS:    Imaging Personally Reviewed:  [ ] YES  [ ] NO    HEALTH ISSUES - PROBLEM Dx:  Hypothyroidism: Hypothyroidism  Diverticulitis of large intestine with abscess without bleeding: Diverticulitis of large intestine with abscess without bleeding

## 2020-02-01 NOTE — PROGRESS NOTE ADULT - ASSESSMENT
66yo F here with sigmoid diverticulitis with intramural abscess failing outpatient antibiotic    NPO- BOWEL REST 5-7 days  serial abdominal exams, no peritoneal signs at this time  IV abx per ID recs  GI d/w Dr. Aranda  appreciated surgical f/u- will need resection likely as outpatient if responding to antibiotic  c/w PPN      #Progress Note Handoff    Pending (specify):  Consults_________, Tests________, Test Results_______, Other__plan as above_______    Family discussion: plan as above discussed with patient    Disposition:  Unknown at this time________

## 2020-02-01 NOTE — PROGRESS NOTE ADULT - SUBJECTIVE AND OBJECTIVE BOX
CONSULT PROGRESS NOTE    S: No acute events. Started on PPN. States she is having gas pain and feels like she needs to have a BM. Passing flatus.    O: Vitals: Vital Signs Last 24 Hrs  T(C): 37.1 (01 Feb 2020 05:25), Max: 37.2 (31 Jan 2020 22:17)  T(F): 98.7 (01 Feb 2020 05:25), Max: 99 (31 Jan 2020 22:17)  HR: 72 (01 Feb 2020 05:25) (65 - 72)  BP: 135/69 (01 Feb 2020 05:25) (109/58 - 135/69)  BP(mean): --  RR: 16 (01 Feb 2020 05:25) (16 - 16)  SpO2: --    Gen: NAD  Abd: Soft, nontender, no guarding or rebound    LABS:                        12.0   14.01 )-----------( 397      ( 31 Jan 2020 06:52 )             35.3     01-31    138  |  99  |  10  ----------------------------<  58<L>  4.3   |  17  |  0.7    Ca    9.0      31 Jan 2020 06:52    TPro  5.9<L>  /  Alb  3.5  /  TBili  0.4  /  DBili  x   /  AST  10  /  ALT  <5  /  AlkPhos  74  01-31

## 2020-02-02 LAB
ANION GAP SERPL CALC-SCNC: 19 MMOL/L — HIGH (ref 7–14)
BUN SERPL-MCNC: 12 MG/DL — SIGNIFICANT CHANGE UP (ref 10–20)
CALCIUM SERPL-MCNC: 8.5 MG/DL — SIGNIFICANT CHANGE UP (ref 8.5–10.1)
CHLORIDE SERPL-SCNC: 99 MMOL/L — SIGNIFICANT CHANGE UP (ref 98–110)
CO2 SERPL-SCNC: 23 MMOL/L — SIGNIFICANT CHANGE UP (ref 17–32)
CREAT SERPL-MCNC: 0.6 MG/DL — LOW (ref 0.7–1.5)
GLUCOSE SERPL-MCNC: 97 MG/DL — SIGNIFICANT CHANGE UP (ref 70–99)
MAGNESIUM SERPL-MCNC: 2.1 MG/DL — SIGNIFICANT CHANGE UP (ref 1.8–2.4)
PHOSPHATE SERPL-MCNC: 3.7 MG/DL — SIGNIFICANT CHANGE UP (ref 2.1–4.9)
POTASSIUM SERPL-MCNC: 4.3 MMOL/L — SIGNIFICANT CHANGE UP (ref 3.5–5)
POTASSIUM SERPL-SCNC: 4.3 MMOL/L — SIGNIFICANT CHANGE UP (ref 3.5–5)
SODIUM SERPL-SCNC: 141 MMOL/L — SIGNIFICANT CHANGE UP (ref 135–146)

## 2020-02-02 PROCEDURE — 99233 SBSQ HOSP IP/OBS HIGH 50: CPT

## 2020-02-02 RX ORDER — ELECTROLYTE SOLUTION,INJ
1 VIAL (ML) INTRAVENOUS
Refills: 0 | Status: DISCONTINUED | OUTPATIENT
Start: 2020-02-02 | End: 2020-02-02

## 2020-02-02 RX ORDER — I.V. FAT EMULSION 20 G/100ML
1.7 EMULSION INTRAVENOUS
Qty: 100.47 | Refills: 0 | Status: DISCONTINUED | OUTPATIENT
Start: 2020-02-02 | End: 2020-02-02

## 2020-02-02 RX ADMIN — ERTAPENEM SODIUM 120 MILLIGRAM(S): 1 INJECTION, POWDER, LYOPHILIZED, FOR SOLUTION INTRAMUSCULAR; INTRAVENOUS at 17:20

## 2020-02-02 RX ADMIN — Medication 25 MICROGRAM(S): at 05:07

## 2020-02-02 RX ADMIN — Medication 1 EACH: at 20:22

## 2020-02-02 RX ADMIN — I.V. FAT EMULSION 31.4 GM/KG/DAY: 20 EMULSION INTRAVENOUS at 20:22

## 2020-02-02 RX ADMIN — CHLORHEXIDINE GLUCONATE 1 APPLICATION(S): 213 SOLUTION TOPICAL at 07:24

## 2020-02-02 NOTE — PROGRESS NOTE ADULT - ASSESSMENT
64yo F here with sigmoid diverticulitis with intramural abscess failing outpatient antibiotic    NPO- BOWEL REST 5-7 days  will consider resuming po diet in 2 days if no fevers or leukocytosis/ abdominal pain continues to subside  serial abdominal exams, no peritoneal signs at this time  IV abx per ID recs  GI d/w Dr. Aranda  appreciated surgical f/u- will need resection likely as outpatient if responding to antibiotic  c/w PPN      #Progress Note Handoff    Pending (specify):  Consults_________, Tests________, Test Results_______, Other__plan as above_______    Family discussion: plan as above discussed with patient    Disposition:  Unknown at this time________

## 2020-02-02 NOTE — PROGRESS NOTE ADULT - SUBJECTIVE AND OBJECTIVE BOX
S: Still having occasional cramps; had soft BM today with flatus (cramps relieved with flatus). No N/V. On PPN  O; Vital Signs Last 24 Hrs  T(C): 36.9 (02 Feb 2020 06:03), Max: 37.2 (01 Feb 2020 21:00)  T(F): 98.5 (02 Feb 2020 06:03), Max: 98.9 (01 Feb 2020 21:00)  HR: 72 (02 Feb 2020 06:03) (65 - 75)  BP: 111/585 (02 Feb 2020 06:03) (111/585 - 139/65)  BP(mean): --  RR: 16 (02 Feb 2020 06:03) (16 - 18)      EXAM:  abd: soft, mild LLQ/suprapubic tenderness      Labs:  PENDING

## 2020-02-02 NOTE — CHART NOTE - NSCHARTNOTEFT_GEN_A_CORE
Pt due for comprehensive assessment 2/2 d/t NPO/clear x5 days. Pt being followed by [facility] at this time (as of 1/31). RD to sign off for this reason.

## 2020-02-02 NOTE — PROGRESS NOTE ADULT - ASSESSMENT
65F with diverticulitis & intramural abscess. Abdominal exam has improved since yesterday.    1. Continue IV abx  2. consider starting clear liquid diet  3. No acute surgical intervention - may F/U with Dr. Cole for elective surgery  4. F/U labs today

## 2020-02-02 NOTE — PROGRESS NOTE ADULT - SUBJECTIVE AND OBJECTIVE BOX
KIM WEISS  65y  Female      Patient is a 65y old  Female who presents with a chief complaint of Diverticulitis (02 Feb 2020 10:04)      INTERVAL HPI/OVERNIGHT EVENTS: marginal improvement in LLQ abdominal pain, 2x BM's today. less rectal pressure after BM      REVIEW OF SYSTEMS:  as above  All other review of systems negative    T(C): 36.9 (02-02-20 @ 06:03), Max: 37.2 (02-01-20 @ 21:00)  HR: 72 (02-02-20 @ 06:03) (65 - 75)  BP: 111/585 (02-02-20 @ 06:03) (111/585 - 139/65)  RR: 16 (02-02-20 @ 06:03) (16 - 18)  SpO2: --  Wt(kg): --Vital Signs Last 24 Hrs  T(C): 36.9 (02 Feb 2020 06:03), Max: 37.2 (01 Feb 2020 21:00)  T(F): 98.5 (02 Feb 2020 06:03), Max: 98.9 (01 Feb 2020 21:00)  HR: 72 (02 Feb 2020 06:03) (65 - 75)  BP: 111/585 (02 Feb 2020 06:03) (111/585 - 139/65)  BP(mean): --  RR: 16 (02 Feb 2020 06:03) (16 - 18)  SpO2: --        PHYSICAL EXAM:  GENERAL: NAD  PSYCH: no agitation, baseline mentation  NERVOUS SYSTEM:  Alert & Oriented X3, no new focal deficits  PULMONARY: Clear to percussion bilaterally; No rales, rhonchi, wheezing, or rubs  CARDIOVASCULAR: Regular rate and rhythm; No murmurs, rubs, or gallops  GI:  LLQ less tender to deep palpation, no rebound or guarding  EXTREMITIES:  2+ Peripheral Pulses, No clubbing, cyanosis, or edema, on PPN    Consultant(s) Notes Reviewed:  [x ] YES  [ ] NO    Discussed with Consultants/Other Providers [ x] YES     LABS                          12.5   12.58 )-----------( 431      ( 01 Feb 2020 06:29 )             36.1     02-02    141  |  99  |  12  ----------------------------<  97  4.3   |  23  |  0.6<L>    Ca    8.5      02 Feb 2020 08:30  Phos  3.7     02-02  Mg     2.1     02-02    TPro  6.1  /  Alb  3.6  /  TBili  <0.2  /  DBili  x   /  AST  10  /  ALT  <5  /  AlkPhos  74  02-01          Lactate Trend        CAPILLARY BLOOD GLUCOSE      POCT Blood Glucose.: 77 mg/dL (31 Jan 2020 15:35)        RADIOLOGY & ADDITIONAL TESTS:    Imaging Personally Reviewed:  [ ] YES  [ ] NO    HEALTH ISSUES - PROBLEM Dx:  Hypothyroidism: Hypothyroidism  Diverticulitis of large intestine with abscess without bleeding: Diverticulitis of large intestine with abscess without bleeding

## 2020-02-03 LAB
ALBUMIN SERPL ELPH-MCNC: 3.7 G/DL — SIGNIFICANT CHANGE UP (ref 3.5–5.2)
ALP SERPL-CCNC: 77 U/L — SIGNIFICANT CHANGE UP (ref 30–115)
ALT FLD-CCNC: <5 U/L — SIGNIFICANT CHANGE UP (ref 0–41)
ANION GAP SERPL CALC-SCNC: 18 MMOL/L — HIGH (ref 7–14)
AST SERPL-CCNC: 11 U/L — SIGNIFICANT CHANGE UP (ref 0–41)
BASOPHILS # BLD AUTO: 0.06 K/UL — SIGNIFICANT CHANGE UP (ref 0–0.2)
BASOPHILS NFR BLD AUTO: 0.5 % — SIGNIFICANT CHANGE UP (ref 0–1)
BILIRUB SERPL-MCNC: <0.2 MG/DL — SIGNIFICANT CHANGE UP (ref 0.2–1.2)
BUN SERPL-MCNC: 14 MG/DL — SIGNIFICANT CHANGE UP (ref 10–20)
CALCIUM SERPL-MCNC: 9.1 MG/DL — SIGNIFICANT CHANGE UP (ref 8.5–10.1)
CHLORIDE SERPL-SCNC: 101 MMOL/L — SIGNIFICANT CHANGE UP (ref 98–110)
CO2 SERPL-SCNC: 22 MMOL/L — SIGNIFICANT CHANGE UP (ref 17–32)
CREAT SERPL-MCNC: 0.6 MG/DL — LOW (ref 0.7–1.5)
EOSINOPHIL # BLD AUTO: 0.28 K/UL — SIGNIFICANT CHANGE UP (ref 0–0.7)
EOSINOPHIL NFR BLD AUTO: 2.3 % — SIGNIFICANT CHANGE UP (ref 0–8)
GLUCOSE SERPL-MCNC: 104 MG/DL — HIGH (ref 70–99)
HCT VFR BLD CALC: 37.2 % — SIGNIFICANT CHANGE UP (ref 37–47)
HGB BLD-MCNC: 12.8 G/DL — SIGNIFICANT CHANGE UP (ref 12–16)
IMM GRANULOCYTES NFR BLD AUTO: 0.4 % — HIGH (ref 0.1–0.3)
LYMPHOCYTES # BLD AUTO: 1.8 K/UL — SIGNIFICANT CHANGE UP (ref 1.2–3.4)
LYMPHOCYTES # BLD AUTO: 14.8 % — LOW (ref 20.5–51.1)
MAGNESIUM SERPL-MCNC: 2 MG/DL — SIGNIFICANT CHANGE UP (ref 1.8–2.4)
MCHC RBC-ENTMCNC: 32.8 PG — HIGH (ref 27–31)
MCHC RBC-ENTMCNC: 34.4 G/DL — SIGNIFICANT CHANGE UP (ref 32–37)
MCV RBC AUTO: 95.4 FL — SIGNIFICANT CHANGE UP (ref 81–99)
MONOCYTES # BLD AUTO: 0.66 K/UL — HIGH (ref 0.1–0.6)
MONOCYTES NFR BLD AUTO: 5.4 % — SIGNIFICANT CHANGE UP (ref 1.7–9.3)
NEUTROPHILS # BLD AUTO: 9.3 K/UL — HIGH (ref 1.4–6.5)
NEUTROPHILS NFR BLD AUTO: 76.6 % — HIGH (ref 42.2–75.2)
NRBC # BLD: 0 /100 WBCS — SIGNIFICANT CHANGE UP (ref 0–0)
PHOSPHATE SERPL-MCNC: 4.4 MG/DL — SIGNIFICANT CHANGE UP (ref 2.1–4.9)
PLATELET # BLD AUTO: 473 K/UL — HIGH (ref 130–400)
POTASSIUM SERPL-MCNC: 4.3 MMOL/L — SIGNIFICANT CHANGE UP (ref 3.5–5)
POTASSIUM SERPL-SCNC: 4.3 MMOL/L — SIGNIFICANT CHANGE UP (ref 3.5–5)
PROT SERPL-MCNC: 6.5 G/DL — SIGNIFICANT CHANGE UP (ref 6–8)
RBC # BLD: 3.9 M/UL — LOW (ref 4.2–5.4)
RBC # FLD: 11.8 % — SIGNIFICANT CHANGE UP (ref 11.5–14.5)
SODIUM SERPL-SCNC: 141 MMOL/L — SIGNIFICANT CHANGE UP (ref 135–146)
WBC # BLD: 12.15 K/UL — HIGH (ref 4.8–10.8)
WBC # FLD AUTO: 12.15 K/UL — HIGH (ref 4.8–10.8)

## 2020-02-03 PROCEDURE — 99231 SBSQ HOSP IP/OBS SF/LOW 25: CPT | Mod: GC

## 2020-02-03 PROCEDURE — 99233 SBSQ HOSP IP/OBS HIGH 50: CPT

## 2020-02-03 PROCEDURE — 74177 CT ABD & PELVIS W/CONTRAST: CPT | Mod: 26

## 2020-02-03 RX ORDER — IOHEXOL 300 MG/ML
30 INJECTION, SOLUTION INTRAVENOUS ONCE
Refills: 0 | Status: COMPLETED | OUTPATIENT
Start: 2020-02-03 | End: 2020-02-03

## 2020-02-03 RX ORDER — I.V. FAT EMULSION 20 G/100ML
1.7 EMULSION INTRAVENOUS
Qty: 100.47 | Refills: 0 | Status: DISCONTINUED | OUTPATIENT
Start: 2020-02-03 | End: 2020-02-03

## 2020-02-03 RX ORDER — ELECTROLYTE SOLUTION,INJ
1 VIAL (ML) INTRAVENOUS
Refills: 0 | Status: DISCONTINUED | OUTPATIENT
Start: 2020-02-03 | End: 2020-02-03

## 2020-02-03 RX ADMIN — IOHEXOL 30 MILLILITER(S): 300 INJECTION, SOLUTION INTRAVENOUS at 11:18

## 2020-02-03 RX ADMIN — Medication 1 EACH: at 20:29

## 2020-02-03 RX ADMIN — I.V. FAT EMULSION 31.4 GM/KG/DAY: 20 EMULSION INTRAVENOUS at 20:30

## 2020-02-03 RX ADMIN — Medication 25 MICROGRAM(S): at 05:23

## 2020-02-03 RX ADMIN — CHLORHEXIDINE GLUCONATE 1 APPLICATION(S): 213 SOLUTION TOPICAL at 11:18

## 2020-02-03 RX ADMIN — ERTAPENEM SODIUM 120 MILLIGRAM(S): 1 INJECTION, POWDER, LYOPHILIZED, FOR SOLUTION INTRAMUSCULAR; INTRAVENOUS at 16:32

## 2020-02-03 NOTE — PROGRESS NOTE ADULT - SUBJECTIVE AND OBJECTIVE BOX
Pt is a 66y/o female admitted with diverticulitis with intramural abscess, currently on PPN. Pt seen and evaluated at bedside. She reports improvement in abdominal pain since admission. +mild pressure like discomfort in LLQ. Pt reports small, hard BM yesterday x 2. +Flatus. Denies nausea, vomiting, or other complaints.    Vital Signs Last 24 Hrs  T(C): 37.1 (03 Feb 2020 05:37), Max: 37.2 (02 Feb 2020 14:03)  T(F): 98.8 (03 Feb 2020 05:37), Max: 98.9 (02 Feb 2020 14:03)  HR: 67 (03 Feb 2020 05:37) (67 - 77)  BP: 109/55 (03 Feb 2020 05:37) (109/55 - 118/59)  BP(mean): --  RR: 16 (03 Feb 2020 05:37) (16 - 16)      Gen NAD, A&Ox3  Abd soft, minimal tenderness LLQ, nu guarding, not distended                              12.8   12.15 )-----------( 473      ( 03 Feb 2020 07:48 )             37.2       02-03    141  |  101  |  14  ----------------------------<  104<H>  4.3   |  22  |  0.6<L>    Ca    9.1      03 Feb 2020 07:48  Phos  4.4     02-03  Mg     2.0     02-03    TPro  6.5  /  Alb  3.7  /  TBili  <0.2  /  DBili  x   /  AST  11  /  ALT  <5  /  AlkPhos  77  02-03

## 2020-02-03 NOTE — CHART NOTE - NSCHARTNOTEFT_GEN_A_CORE
Small rectangular slightly erythematous and slightly swollen area just proximal to right wrist. Tender to touch. Advised elevation. Will order compresses prn

## 2020-02-03 NOTE — PROGRESS NOTE ADULT - ASSESSMENT
Pt is a 64 y/o female with Acute Diverticulitis, with intramural abscess on PPN, with mild leukocytosis/Constipation    NPO/PPN  Continue to monitor CBC/lytes  IV abx  As recommended by GI, pt will have CT Scan Abdomen/Pelvis with PO/IV Contrast  Encourage ambulation  DVT/GI Prophylaxis Pt is a 66 y/o female with Acute Diverticulitis, with intramural abscess on PPN, with mild leukocytosis/Constipation    Ok to advance to clear liquid diet  Continue to monitor CBC/lytes  IV abx  As recommended by GI, pt will have CT Scan Abdomen/Pelvis with PO/IV Contrast  Encourage ambulation  DVT/GI Prophylaxis

## 2020-02-03 NOTE — PROGRESS NOTE ADULT - SUBJECTIVE AND OBJECTIVE BOX
KIM WEISS  65y  Female      Patient is a 65y old  Female who presents with a chief complaint of Diverticulitis (03 Feb 2020 10:45)      INTERVAL HPI/OVERNIGHT EVENTS: LLQ pain and rectal pressure intermittently recurring, similar in severity to prior. no n/v. not hungry      REVIEW OF SYSTEMS:  as above  All other review of systems negative    T(C): 37.1 (02-03-20 @ 05:37), Max: 37.2 (02-02-20 @ 14:03)  HR: 67 (02-03-20 @ 05:37) (67 - 77)  BP: 109/55 (02-03-20 @ 05:37) (109/55 - 118/59)  RR: 16 (02-03-20 @ 05:37) (16 - 16)  SpO2: --  Wt(kg): --Vital Signs Last 24 Hrs  T(C): 37.1 (03 Feb 2020 05:37), Max: 37.2 (02 Feb 2020 14:03)  T(F): 98.8 (03 Feb 2020 05:37), Max: 98.9 (02 Feb 2020 14:03)  HR: 67 (03 Feb 2020 05:37) (67 - 77)  BP: 109/55 (03 Feb 2020 05:37) (109/55 - 118/59)  BP(mean): --  RR: 16 (03 Feb 2020 05:37) (16 - 16)  SpO2: --        PHYSICAL EXAM:  GENERAL: NAD  PSYCH: no agitation, baseline mentation  NERVOUS SYSTEM:  Alert & Oriented X3, no new focal deficits  PULMONARY: Clear to percussion bilaterally; No rales, rhonchi, wheezing, or rubs  CARDIOVASCULAR: Regular rate and rhythm; No murmurs, rubs, or gallops  GI:  LLQ tender to deep palpation, no rebound  EXTREMITIES:  2+ Peripheral Pulses, No clubbing, cyanosis, or edema  SKIN euvolemic appearing    Consultant(s) Notes Reviewed:  [x ] YES  [ ] NO    Discussed with Consultants/Other Providers [ x] YES     LABS                          12.8   12.15 )-----------( 473      ( 03 Feb 2020 07:48 )             37.2     02-03    141  |  101  |  14  ----------------------------<  104<H>  4.3   |  22  |  0.6<L>    Ca    9.1      03 Feb 2020 07:48  Phos  4.4     02-03  Mg     2.0     02-03    TPro  6.5  /  Alb  3.7  /  TBili  <0.2  /  DBili  x   /  AST  11  /  ALT  <5  /  AlkPhos  77  02-03          Lactate Trend        CAPILLARY BLOOD GLUCOSE            RADIOLOGY & ADDITIONAL TESTS:    Imaging Personally Reviewed:  [ ] YES  [ ] NO    HEALTH ISSUES - PROBLEM Dx:  Hypothyroidism: Hypothyroidism  Diverticulitis of large intestine with abscess without bleeding: Diverticulitis of large intestine with abscess without bleeding

## 2020-02-03 NOTE — PROGRESS NOTE ADULT - ASSESSMENT
Abdominal pain no improvement  Diverticulitis with abscess    Plan   NPO except meds  Continue IV antibiotics  PPN today  Monitor CBC  CT scan abd and pelvis with PO and IV contrast

## 2020-02-03 NOTE — CHART NOTE - NSCHARTNOTEFT_GEN_A_CORE
-pt s/p CT a/p with po and IV contrast - results show 3 cm pelvic abscess  -IR consult placed   -case discussed with the attending

## 2020-02-03 NOTE — PROGRESS NOTE ADULT - SUBJECTIVE AND OBJECTIVE BOX
HPI:  63 yo F with PMHx of Hypothyroidism, Recurrent Diverticulitis, recently admitted at [facility] and discharged on 01/03/2020 for Diverticulitis failed on outpatient antibiotics, presented with complaint of dull, at times radiating to back left lower quadrant abdominal pain associated with decreased appetite since Sunday, pain currently 8/10, although patient appears comfortable. Patient was seen by GI yesterday, reported to have constipation. Patient denies fever, chills, nausea, vomiting, shortness of breath, last bowel movement this morning, positive flatus. (29 Jan 2020 19:37)    States not feeling much better    PAST MEDICAL & SURGICAL HISTORY:  Diverticulitis  Hypothyroidism  H/O tubal ligation      REVIEW OF SYSTEMS:    CONSTITUTIONAL: No weakness, fevers or chills  EYES/ENT: No visual changes;  No vertigo or throat pain   NECK: No pain or stiffness  RESPIRATORY: No cough, wheezing, hemoptysis; No shortness of breath  CARDIOVASCULAR: No chest pain or palpitations  GASTROINTESTINAL: Abdominal pain and rectal pain, No nausea, vomiting, or hematemesis; No diarrhea or constipation. No melena or hematochezia.  GENITOURINARY: No dysuria, frequency or hematuria  NEUROLOGICAL: No numbness or weakness  SKIN: No itching, rashes      MEDICATIONS  (STANDING):  acetaminophen   Tablet .. 650 milliGRAM(s) Oral every 6 hours  chlorhexidine 4% Liquid 1 Application(s) Topical <User Schedule>  ertapenem  IVPB 1000 milliGRAM(s) IV Intermittent every 24 hours  fat emulsion (Plant Based) 20% Infusion 1.7 Gm/kG/Day (31.397 mL/Hr) IV Continuous <Continuous>  lactated ringers. 1000 milliLiter(s) (75 mL/Hr) IV Continuous <Continuous>  levothyroxine 25 MICROGram(s) Oral daily  Parenteral Nutrition - Adult 1 Each (70 mL/Hr) TPN Continuous <Continuous>    MEDICATIONS  (PRN):  morphine  - Injectable 1 milliGRAM(s) IV Push every 6 hours PRN Moderate Pain (4 - 6)      Allergies    No Known Allergies    Intolerances        SOCIAL HISTORY:    FAMILY HISTORY:  FH: myocardial infarction: mother  FH: diabetes mellitus: mother      Vital Signs Last 24 Hrs  T(C): 37.1 (03 Feb 2020 05:37), Max: 37.2 (02 Feb 2020 14:03)  T(F): 98.8 (03 Feb 2020 05:37), Max: 98.9 (02 Feb 2020 14:03)  HR: 67 (03 Feb 2020 05:37) (67 - 77)  BP: 109/55 (03 Feb 2020 05:37) (109/55 - 118/59)  BP(mean): --  RR: 16 (03 Feb 2020 05:37) (16 - 16)  SpO2: --    PHYSICAL EXAM:  GENERAL: NAD, well-developed, well nourished, looks stated age  HEAD:  Atraumatic, Normocephalic  EYES: EOMI, PERRLA, conjunctiva and sclera clear  NECK: Supple, No JVD, no bruits, no masses, no thyroid enlargement  ENMT: No tonsillar erythema, exudated or enlargement, moist mucous membranes  CHEST/LUNG: Clear to auscultation bilaterally; No rales, rhonchi or wheezing, no dullness to percussion  HEART: S1,S2 Regular rate and rhythm; No murmurs, rubs, or gallops  ABDOMEN: Soft, tender lower abdomen, nondistended, no rebound tenderness; No palpable masses, no hernias, no organomegaly; Bowel sounds present and normoactive  EXTREMITIES:  2+ peripheral pulses bilaterally and symmetrically, no clubbing, cyanosis, or edema  LYMPH: No lymphadenopathy  PSYCH: AAOx3, normal mood and affect  NEUROLOGY: non-focal, muscle strength 5/5 all extremities, DTRs 2+ symmetrically  SKIN: No rashes or lesions    LABS:                        12.0   14.01 )-----------( 397      ( 31 Jan 2020 06:52 )             35.3     01-31    138  |  99  |  10  ----------------------------<  58<L>  4.3   |  17  |  0.7    Ca    9.0      31 Jan 2020 06:52  Phos  4.1     01-30  Mg     1.6     01-30    TPro  5.9<L>  /  Alb  3.5  /  TBili  0.4  /  DBili  x   /  AST  10  /  ALT  <5  /  AlkPhos  74  01-31          RADIOLOGY & ADDITIONAL STUDIES:    CT diverticulitis with abscess

## 2020-02-03 NOTE — PROGRESS NOTE ADULT - ASSESSMENT
64yo F here with sigmoid diverticulitis with intramural abscess failing outpatient antibiotic    NPO- BOWEL REST  discussed case with GI, wbc relatively stable, platelets increasing a bit, LLQ pain persists- repeat CT abd/pelvis c po and IV contrast today, reevaluate abscess/collection- if amenable then IR review for possible drainage  serial abdominal exams, no peritoneal signs at this time  IV abx per ID recs  appreciated surgical f/u- will need resection likely as outpatient if responding to antibiotic, otherwise might need intervention on this admission if CT worsening and IR unable to drain  c/w PPN  high risk      #Progress Note Handoff    Pending (specify):  Consults_________, Tests________, Test Results_______, Other__plan as above_______    Family discussion: plan as above discussed with patient    Disposition:  Unknown at this time________ 66yo F here with sepsis poa 2/2 sigmoid diverticulitis now with intramural abscess failing outpatient antibiotic    NPO- BOWEL REST  discussed case with GI, wbc relatively stable, platelets increasing a bit, LLQ pain persists- repeat CT abd/pelvis c po and IV contrast today, reevaluate abscess/collection- if amenable then IR review for possible drainage  serial abdominal exams, no peritoneal signs at this time  IV abx per ID recs  appreciated surgical f/u- will need resection likely as outpatient if responding to antibiotic, otherwise might need intervention on this admission if CT worsening and IR unable to drain  c/w PPN  high risk      #Progress Note Handoff    Pending (specify):  Consults_________, Tests________, Test Results_______, Other__plan as above_______    Family discussion: plan as above discussed with patient    Disposition:  Unknown at this time________

## 2020-02-04 LAB
ANION GAP SERPL CALC-SCNC: 14 MMOL/L — SIGNIFICANT CHANGE UP (ref 7–14)
APTT BLD: 34.8 SEC — SIGNIFICANT CHANGE UP (ref 27–39.2)
BLD GP AB SCN SERPL QL: SIGNIFICANT CHANGE UP
BUN SERPL-MCNC: 13 MG/DL — SIGNIFICANT CHANGE UP (ref 10–20)
CALCIUM SERPL-MCNC: 9.2 MG/DL — SIGNIFICANT CHANGE UP (ref 8.5–10.1)
CHLORIDE SERPL-SCNC: 99 MMOL/L — SIGNIFICANT CHANGE UP (ref 98–110)
CO2 SERPL-SCNC: 25 MMOL/L — SIGNIFICANT CHANGE UP (ref 17–32)
CREAT SERPL-MCNC: 0.6 MG/DL — LOW (ref 0.7–1.5)
GLUCOSE SERPL-MCNC: 101 MG/DL — HIGH (ref 70–99)
HCT VFR BLD CALC: 38.9 % — SIGNIFICANT CHANGE UP (ref 37–47)
HGB BLD-MCNC: 13.1 G/DL — SIGNIFICANT CHANGE UP (ref 12–16)
INR BLD: 1.12 RATIO — SIGNIFICANT CHANGE UP (ref 0.65–1.3)
MAGNESIUM SERPL-MCNC: 2.2 MG/DL — SIGNIFICANT CHANGE UP (ref 1.8–2.4)
MCHC RBC-ENTMCNC: 32.1 PG — HIGH (ref 27–31)
MCHC RBC-ENTMCNC: 33.7 G/DL — SIGNIFICANT CHANGE UP (ref 32–37)
MCV RBC AUTO: 95.3 FL — SIGNIFICANT CHANGE UP (ref 81–99)
NRBC # BLD: 0 /100 WBCS — SIGNIFICANT CHANGE UP (ref 0–0)
PHOSPHATE SERPL-MCNC: 5.2 MG/DL — HIGH (ref 2.1–4.9)
PLATELET # BLD AUTO: 515 K/UL — HIGH (ref 130–400)
POTASSIUM SERPL-MCNC: 5.1 MMOL/L — HIGH (ref 3.5–5)
POTASSIUM SERPL-SCNC: 5.1 MMOL/L — HIGH (ref 3.5–5)
PROT SERPL-MCNC: 6.5 G/DL — SIGNIFICANT CHANGE UP (ref 6–8)
PROTHROM AB SERPL-ACNC: 12.9 SEC — HIGH (ref 9.95–12.87)
RBC # BLD: 4.08 M/UL — LOW (ref 4.2–5.4)
RBC # FLD: 11.7 % — SIGNIFICANT CHANGE UP (ref 11.5–14.5)
SODIUM SERPL-SCNC: 138 MMOL/L — SIGNIFICANT CHANGE UP (ref 135–146)
WBC # BLD: 13.93 K/UL — HIGH (ref 4.8–10.8)
WBC # FLD AUTO: 13.93 K/UL — HIGH (ref 4.8–10.8)

## 2020-02-04 PROCEDURE — 99233 SBSQ HOSP IP/OBS HIGH 50: CPT

## 2020-02-04 PROCEDURE — 99231 SBSQ HOSP IP/OBS SF/LOW 25: CPT

## 2020-02-04 RX ORDER — ELECTROLYTE SOLUTION,INJ
1 VIAL (ML) INTRAVENOUS
Refills: 0 | Status: DISCONTINUED | OUTPATIENT
Start: 2020-02-04 | End: 2020-02-04

## 2020-02-04 RX ORDER — ERTAPENEM SODIUM 1 G/1
1000 INJECTION, POWDER, LYOPHILIZED, FOR SOLUTION INTRAMUSCULAR; INTRAVENOUS EVERY 24 HOURS
Refills: 0 | Status: DISCONTINUED | OUTPATIENT
Start: 2020-02-05 | End: 2020-02-07

## 2020-02-04 RX ORDER — I.V. FAT EMULSION 20 G/100ML
1.7 EMULSION INTRAVENOUS
Qty: 100.47 | Refills: 0 | Status: DISCONTINUED | OUTPATIENT
Start: 2020-02-04 | End: 2020-02-04

## 2020-02-04 RX ORDER — ERTAPENEM SODIUM 1 G/1
1000 INJECTION, POWDER, LYOPHILIZED, FOR SOLUTION INTRAMUSCULAR; INTRAVENOUS ONCE
Refills: 0 | Status: COMPLETED | OUTPATIENT
Start: 2020-02-04 | End: 2020-02-04

## 2020-02-04 RX ORDER — ERTAPENEM SODIUM 1 G/1
INJECTION, POWDER, LYOPHILIZED, FOR SOLUTION INTRAMUSCULAR; INTRAVENOUS
Refills: 0 | Status: DISCONTINUED | OUTPATIENT
Start: 2020-02-04 | End: 2020-02-07

## 2020-02-04 RX ADMIN — ERTAPENEM SODIUM 120 MILLIGRAM(S): 1 INJECTION, POWDER, LYOPHILIZED, FOR SOLUTION INTRAMUSCULAR; INTRAVENOUS at 13:58

## 2020-02-04 RX ADMIN — Medication 1 EACH: at 20:44

## 2020-02-04 RX ADMIN — I.V. FAT EMULSION 31.4 GM/KG/DAY: 20 EMULSION INTRAVENOUS at 20:48

## 2020-02-04 RX ADMIN — Medication 25 MICROGRAM(S): at 05:23

## 2020-02-04 NOTE — PROGRESS NOTE ADULT - ASSESSMENT
IMP:  sigmoid diverticulitis with intramural abscess failing outpatient antibiotics  hypothyroidism    PLAN:  - bowel rest, plan IR drainage tomorrow  - PPN  - famotidine included in PPN (d/c PPI, one less IVPB med)  - zinc level not done  - vitamin D low - providing daily dose in MVI, but would wait until po diet permitted to supplement fully  d/w Dr Tamayo

## 2020-02-04 NOTE — PROGRESS NOTE ADULT - SUBJECTIVE AND OBJECTIVE BOX
KIM WEISS  65y  Female      Patient is a 65y old  Female who presents with a chief complaint of Diverticulitis (04 Feb 2020 12:33)      INTERVAL HPI/OVERNIGHT EVENTS: LLQ pain and rectal pressure persist. no n/v      REVIEW OF SYSTEMS:  as above  All other review of systems negative    T(C): 36.2 (02-04-20 @ 13:35), Max: 36.8 (02-04-20 @ 06:18)  HR: 77 (02-04-20 @ 13:35) (68 - 84)  BP: 120/55 (02-04-20 @ 13:35) (110/75 - 120/55)  RR: 16 (02-04-20 @ 13:35) (16 - 16)  SpO2: --  Wt(kg): --Vital Signs Last 24 Hrs  T(C): 36.2 (04 Feb 2020 13:35), Max: 36.8 (04 Feb 2020 06:18)  T(F): 97.1 (04 Feb 2020 13:35), Max: 98.2 (04 Feb 2020 06:18)  HR: 77 (04 Feb 2020 13:35) (68 - 84)  BP: 120/55 (04 Feb 2020 13:35) (110/75 - 120/55)  BP(mean): --  RR: 16 (04 Feb 2020 13:35) (16 - 16)  SpO2: --        PHYSICAL EXAM:  GENERAL: NAD  PSYCH: no agitation, baseline mentation  NERVOUS SYSTEM:  Alert & Oriented X3, no new focal deficits  PULMONARY: Clear to percussion bilaterally; No rales, rhonchi, wheezing, or rubs  CARDIOVASCULAR: Regular rate and rhythm; No murmurs, rubs, or gallops  GI: LLQ tender to deep palpation, no rebound or guarding  EXTREMITIES:  2+ Peripheral Pulses, No clubbing, cyanosis, or edema    Consultant(s) Notes Reviewed:  [x ] YES  [ ] NO    Discussed with Consultants/Other Providers [ x] YES     LABS                          12.8   12.15 )-----------( 473      ( 03 Feb 2020 07:48 )             37.2     02-04    138  |  99  |  13  ----------------------------<  101<H>  5.1<H>   |  25  |  0.6<L>    Ca    9.2      04 Feb 2020 06:14  Phos  5.2     02-04  Mg     2.2     02-04    TPro  6.5  /  Alb  x   /  TBili  x   /  DBili  x   /  AST  x   /  ALT  x   /  AlkPhos  x   02-04          Lactate Trend        CAPILLARY BLOOD GLUCOSE            RADIOLOGY & ADDITIONAL TESTS:    Imaging Personally Reviewed:  [ ] YES  [ ] NO    HEALTH ISSUES - PROBLEM Dx:  Hypothyroidism: Hypothyroidism  Diverticulitis of large intestine with abscess without bleeding: Diverticulitis of large intestine with abscess without bleeding

## 2020-02-04 NOTE — PROGRESS NOTE ADULT - SUBJECTIVE AND OBJECTIVE BOX
CT from yesterday show 3 cm pelvic abscess likely too small t drain percutaneously but would ask  IR to look at the films  Clinically pt seems better - tolerating clear fluids.      REC:  in view of the resistant nature of this abscess would continue IV antibiotics and  increase diet slowly over the next few days

## 2020-02-04 NOTE — PROGRESS NOTE ADULT - ASSESSMENT
Pt is a 64 y/o female with Diverticular Abscess    IV abx  Pt for drainage of abscess by IR in am  Continue clears for now, NPO p MN  Monitor Labs/Lytes  DVT/GI Prophylaxis

## 2020-02-04 NOTE — PROGRESS NOTE ADULT - SUBJECTIVE AND OBJECTIVE BOX
63 yo female readmitted 1/29 with diverticular intramural abscess.  Plan IR drainage tomorrow.  pt has been tolerating small amts of po clear liquids, but does report mod increase in LLQ discomfort/pressure after she drinks.  pt ambulating, less weak.  periph iv  abd soft, no LE edema  Vital Signs Last 24 Hrs  T(C): 36.8 (04 Feb 2020 06:18), Max: 36.8 (04 Feb 2020 06:18)  T(F): 98.2 (04 Feb 2020 06:18), Max: 98.2 (04 Feb 2020 06:18)  HR: 73 (04 Feb 2020 06:18) (68 - 84)  BP: 113/56 (04 Feb 2020 06:18) (110/75 - 118/56)  RR: 16 (04 Feb 2020 06:18) (16 - 16)    MEDICATIONS  (STANDING):  chlorhexidine 4% Liquid 1 Application(s) Topical <User Schedule>  ertapenem  IVPB 1000 milliGRAM(s) IV Intermittent once      fat emulsion (Plant Based) 20% Infusion 1.7 Gm/kG/Day (31.397 mL/Hr) IV Continuous <Continuous>  levothyroxine 25 MICROGram(s) Oral daily  Parenteral Nutrition - Adult 1 Each (70 mL/Hr) TPN Continuous <Continuous>                        12.8   12.15 )-----------( 473      ( 03 Feb 2020 07:48 )             37.2   02-04    138  |  99  |  13  ----------------------------<  101<H>  5.1<H>   |  25  |  0.6<L>    Ca    9.2      04 Feb 2020 06:14  Phos  5.2     02-04  Mg     2.2     02-04  TPro  6.5  /  Alb  x   /  TBili  x   /  DBili  x   /  AST  x   /  ALT  x   /  AlkPhos  x   02-04    Vitamin D, 25-Hydroxy (02.01.20 @ 12:39)    Vitamin D, 25-Hydroxy: 24:               30 - 80 ng/mL                   [employer] Levels

## 2020-02-04 NOTE — CONSULT NOTE ADULT - SUBJECTIVE AND OBJECTIVE BOX
INTERVENTIONAL RADIOLOGY CONSULT:     Procedure Requested: Abscess drainage    HPI:  65 yo F with PMHx of Hypothyroidism, Recurrent Diverticulitis, recently admitted at [facility] and discharged on 01/03/2020 for Diverticulitis failed on outpatient antibiotics, presented with complaint of dull, at times radiating to back left lower quadrant abdominal pain associated with decreased appetite since Sunday, pain currently 8/10, although patient appears comfortable. Patient was seen by GI yesterday, reported to have constipation. Patient denies fever, chills, nausea, vomiting, shortness of breath, last bowel movement this morning, positive flatus. (29 Jan 2020 19:37)      PAST MEDICAL & SURGICAL HISTORY:  Diverticulitis  Hypothyroidism  H/O tubal ligation      MEDICATIONS  (STANDING):  chlorhexidine 4% Liquid 1 Application(s) Topical <User Schedule>  fat emulsion (Plant Based) 20% Infusion 1.7 Gm/kG/Day (31.397 mL/Hr) IV Continuous <Continuous>  levothyroxine 25 MICROGram(s) Oral daily  Parenteral Nutrition - Adult 1 Each (70 mL/Hr) TPN Continuous <Continuous>    MEDICATIONS  (PRN):  morphine  - Injectable 1 milliGRAM(s) IV Push every 6 hours PRN Moderate Pain (4 - 6)      Allergies    No Known Allergies    Intolerances    FAMILY HISTORY:  FH: myocardial infarction: mother  FH: diabetes mellitus: mother      Physical Exam:   Vital Signs Last 24 Hrs  T(C): 36.8 (04 Feb 2020 06:18), Max: 36.8 (04 Feb 2020 06:18)  T(F): 98.2 (04 Feb 2020 06:18), Max: 98.2 (04 Feb 2020 06:18)  HR: 73 (04 Feb 2020 06:18) (68 - 84)  BP: 113/56 (04 Feb 2020 06:18) (110/75 - 118/56)  BP(mean): --  RR: 16 (04 Feb 2020 06:18) (16 - 16)  SpO2: --    Labs:                         12.8   12.15 )-----------( 473      ( 03 Feb 2020 07:48 )             37.2     02-03    141  |  101  |  14  ----------------------------<  104<H>  4.3   |  22  |  0.6<L>    Ca    9.1      03 Feb 2020 07:48  Phos  4.4     02-03  Mg     2.0     02-03    TPro  6.5  /  Alb  3.7  /  TBili  <0.2  /  DBili  x   /  AST  11  /  ALT  <5  /  AlkPhos  77  02-03        Pertinent labs:                      12.8   12.15 )-----------( 473      ( 03 Feb 2020 07:48 )             37.2       02-03    141  |  101  |  14  ----------------------------<  104<H>  4.3   |  22  |  0.6<L>    Ca    9.1      03 Feb 2020 07:48  Phos  4.4     02-03  Mg     2.0     02-03    TPro  6.5  /  Alb  3.7  /  TBili  <0.2  /  DBili  x   /  AST  11  /  ALT  <5  /  AlkPhos  77  02-03      Radiology & Additional Studies:     Radiology imaging reviewed.       ASSESSMENT/ PLAN:     65 yo F with recurrent Diverticulitis. Now with developing rectosigmoid diverticular abscess.   Failed Antibiotic therapy.  Imaging reviewed: rectosigmoid collection measuring approximately 3cm. Likely open window for intervention.  - Will plan for CT guided abscess aspiration/drainage for tomorrow 2/5, time permitting.   - NPO at midnight  - please obtain coagulation labs      Risks, benefits, and alternatives to treatment discussed. All questions answered with understanding.    Thank you for the courtesy of this consult, please call [phone] with any further questions. INTERVENTIONAL RADIOLOGY CONSULT:     Procedure Requested: Abscess drainage    HPI:  65 yo F with PMHx of Hypothyroidism, Recurrent Diverticulitis, recently admitted at [facility] and discharged on 01/03/2020 for Diverticulitis failed on outpatient antibiotics, presented with complaint of dull, at times radiating to back left lower quadrant abdominal pain associated with decreased appetite since Sunday, pain currently 8/10, although patient appears comfortable. Patient was seen by GI yesterday, reported to have constipation. Patient denies fever, chills, nausea, vomiting, shortness of breath, last bowel movement this morning, positive flatus. (29 Jan 2020 19:37)      PAST MEDICAL & SURGICAL HISTORY:  Diverticulitis  Hypothyroidism  H/O tubal ligation      MEDICATIONS  (STANDING):  chlorhexidine 4% Liquid 1 Application(s) Topical <User Schedule>  fat emulsion (Plant Based) 20% Infusion 1.7 Gm/kG/Day (31.397 mL/Hr) IV Continuous <Continuous>  levothyroxine 25 MICROGram(s) Oral daily  Parenteral Nutrition - Adult 1 Each (70 mL/Hr) TPN Continuous <Continuous>    MEDICATIONS  (PRN):  morphine  - Injectable 1 milliGRAM(s) IV Push every 6 hours PRN Moderate Pain (4 - 6)      Allergies    No Known Allergies    Intolerances    FAMILY HISTORY:  FH: myocardial infarction: mother  FH: diabetes mellitus: mother      Physical Exam:   Vital Signs Last 24 Hrs  T(C): 36.8 (04 Feb 2020 06:18), Max: 36.8 (04 Feb 2020 06:18)  T(F): 98.2 (04 Feb 2020 06:18), Max: 98.2 (04 Feb 2020 06:18)  HR: 73 (04 Feb 2020 06:18) (68 - 84)  BP: 113/56 (04 Feb 2020 06:18) (110/75 - 118/56)  BP(mean): --  RR: 16 (04 Feb 2020 06:18) (16 - 16)  SpO2: --    Labs:                         12.8   12.15 )-----------( 473      ( 03 Feb 2020 07:48 )             37.2     02-03    141  |  101  |  14  ----------------------------<  104<H>  4.3   |  22  |  0.6<L>    Ca    9.1      03 Feb 2020 07:48  Phos  4.4     02-03  Mg     2.0     02-03    TPro  6.5  /  Alb  3.7  /  TBili  <0.2  /  DBili  x   /  AST  11  /  ALT  <5  /  AlkPhos  77  02-03        Pertinent labs:                      12.8   12.15 )-----------( 473      ( 03 Feb 2020 07:48 )             37.2       02-03    141  |  101  |  14  ----------------------------<  104<H>  4.3   |  22  |  0.6<L>    Ca    9.1      03 Feb 2020 07:48  Phos  4.4     02-03  Mg     2.0     02-03    TPro  6.5  /  Alb  3.7  /  TBili  <0.2  /  DBili  x   /  AST  11  /  ALT  <5  /  AlkPhos  77  02-03      Radiology & Additional Studies:     Radiology imaging reviewed.       ASSESSMENT/ PLAN:     65 yo F with recurrent Diverticulitis. Now with developing rectosigmoid diverticular abscess.   Failed Antibiotic therapy.  Imaging reviewed: rectosigmoid collection measuring approximately 3cm. Likely open window for intervention.  - Will plan for CT guided abscess aspiration/drainage for tomorrow 2/5, time permitting.   - NPO at midnight  - please obtain coagulation labs          Thank you for the courtesy of this consult, please call [phone] with any further questions.

## 2020-02-04 NOTE — PROGRESS NOTE ADULT - ASSESSMENT
66yo F here with sepsis poa 2/2 sigmoid diverticulitis now with intramural abscess failing outpatient antibiotic    for IR drainage tomorrow am  NPO after midnight  serial abdominal exams, no peritoneal signs at this time  IV abx per ID recs  c/w PPN  appreciated GI, nutrition, surgical follow ups and recommendations  coags for procedure pending      #Progress Note Handoff    Pending (specify):  Consults_________, Tests________, Test Results_______, Other__plan as above_______    Family discussion: plan as above discussed with patient    Disposition:  Unknown at this time________

## 2020-02-04 NOTE — PROGRESS NOTE ADULT - SUBJECTIVE AND OBJECTIVE BOX
Pt seen and evaluated at bedside. She reports significant improvement in pain.   Pt admits to having multiple bowel movements following PO contrast yesterday. She reports a mild, pressure like sensation in LLQ, which is improved since yesterday. Pt denies Nausea and vomiting. She is tolerating clear liquids and feeling well.    Repeat Ct Scan shows persistence in diverticular abscess. Images evaluated by IR and is scheduled  for drainage in am.    Vital Signs Last 24 Hrs  T(C): 36.8 (04 Feb 2020 06:18), Max: 36.8 (04 Feb 2020 06:18)  T(F): 98.2 (04 Feb 2020 06:18), Max: 98.2 (04 Feb 2020 06:18)  HR: 73 (04 Feb 2020 06:18) (68 - 84)  BP: 113/56 (04 Feb 2020 06:18) (110/75 - 118/56)  BP(mean): --  RR: 16 (04 Feb 2020 06:18) (16 - 16)      Gen NAD, A&Ox3  Abd soft, Minimal LLQ tenderness, not distended, no guarding                              12.8   12.15 )-----------( 473      ( 03 Feb 2020 07:48 )             37.2       02-04    138  |  99  |  13  ----------------------------<  101<H>  5.1<H>   |  25  |  0.6<L>    Ca    9.2      04 Feb 2020 06:14  Phos  5.2     02-04  Mg     2.2     02-04    TPro  6.5  /  Alb  x   /  TBili  x   /  DBili  x   /  AST  x   /  ALT  x   /  AlkPhos  x   02-04              < from: CT Abdomen and Pelvis w/ Oral Cont and w/ IV Cont (02.03.20 @ 14:08) >  3 cm pelvic abscess most likely related to diverticulitis of the sigmoid colon. Whereas on the prior study it appeared to contain both air and debris, it now appears to contain mostly fluid, but is no greater in size. Mild inflammatory changes in the surrounding fat are unchanged. Several small foci of air are seen in the region but these may represent diverticula versus microperforation.        < end of copied text >

## 2020-02-05 LAB
ALBUMIN SERPL ELPH-MCNC: 3.8 G/DL — SIGNIFICANT CHANGE UP (ref 3.5–5.2)
ALP SERPL-CCNC: 97 U/L — SIGNIFICANT CHANGE UP (ref 30–115)
ALT FLD-CCNC: 11 U/L — SIGNIFICANT CHANGE UP (ref 0–41)
ANION GAP SERPL CALC-SCNC: 17 MMOL/L — HIGH (ref 7–14)
AST SERPL-CCNC: 26 U/L — SIGNIFICANT CHANGE UP (ref 0–41)
BASOPHILS # BLD AUTO: 0.05 K/UL — SIGNIFICANT CHANGE UP (ref 0–0.2)
BASOPHILS NFR BLD AUTO: 0.4 % — SIGNIFICANT CHANGE UP (ref 0–1)
BILIRUB SERPL-MCNC: <0.2 MG/DL — SIGNIFICANT CHANGE UP (ref 0.2–1.2)
BUN SERPL-MCNC: 16 MG/DL — SIGNIFICANT CHANGE UP (ref 10–20)
CALCIUM SERPL-MCNC: 9.3 MG/DL — SIGNIFICANT CHANGE UP (ref 8.5–10.1)
CHLORIDE SERPL-SCNC: 97 MMOL/L — LOW (ref 98–110)
CO2 SERPL-SCNC: 23 MMOL/L — SIGNIFICANT CHANGE UP (ref 17–32)
CREAT SERPL-MCNC: 0.8 MG/DL — SIGNIFICANT CHANGE UP (ref 0.7–1.5)
EOSINOPHIL # BLD AUTO: 0.29 K/UL — SIGNIFICANT CHANGE UP (ref 0–0.7)
EOSINOPHIL NFR BLD AUTO: 2.4 % — SIGNIFICANT CHANGE UP (ref 0–8)
GLUCOSE SERPL-MCNC: 108 MG/DL — HIGH (ref 70–99)
HCT VFR BLD CALC: 37.5 % — SIGNIFICANT CHANGE UP (ref 37–47)
HGB BLD-MCNC: 12.9 G/DL — SIGNIFICANT CHANGE UP (ref 12–16)
IMM GRANULOCYTES NFR BLD AUTO: 0.6 % — HIGH (ref 0.1–0.3)
LYMPHOCYTES # BLD AUTO: 1.77 K/UL — SIGNIFICANT CHANGE UP (ref 1.2–3.4)
LYMPHOCYTES # BLD AUTO: 14.8 % — LOW (ref 20.5–51.1)
MAGNESIUM SERPL-MCNC: 2 MG/DL — SIGNIFICANT CHANGE UP (ref 1.8–2.4)
MCHC RBC-ENTMCNC: 33.2 PG — HIGH (ref 27–31)
MCHC RBC-ENTMCNC: 34.4 G/DL — SIGNIFICANT CHANGE UP (ref 32–37)
MCV RBC AUTO: 96.6 FL — SIGNIFICANT CHANGE UP (ref 81–99)
MONOCYTES # BLD AUTO: 0.9 K/UL — HIGH (ref 0.1–0.6)
MONOCYTES NFR BLD AUTO: 7.5 % — SIGNIFICANT CHANGE UP (ref 1.7–9.3)
NEUTROPHILS # BLD AUTO: 8.91 K/UL — HIGH (ref 1.4–6.5)
NEUTROPHILS NFR BLD AUTO: 74.3 % — SIGNIFICANT CHANGE UP (ref 42.2–75.2)
NRBC # BLD: 0 /100 WBCS — SIGNIFICANT CHANGE UP (ref 0–0)
PHOSPHATE SERPL-MCNC: 4.7 MG/DL — SIGNIFICANT CHANGE UP (ref 2.1–4.9)
PLATELET # BLD AUTO: 557 K/UL — HIGH (ref 130–400)
POTASSIUM SERPL-MCNC: 4.5 MMOL/L — SIGNIFICANT CHANGE UP (ref 3.5–5)
POTASSIUM SERPL-SCNC: 4.5 MMOL/L — SIGNIFICANT CHANGE UP (ref 3.5–5)
PROT SERPL-MCNC: 6.7 G/DL — SIGNIFICANT CHANGE UP (ref 6–8)
RBC # BLD: 3.88 M/UL — LOW (ref 4.2–5.4)
RBC # FLD: 11.9 % — SIGNIFICANT CHANGE UP (ref 11.5–14.5)
SODIUM SERPL-SCNC: 137 MMOL/L — SIGNIFICANT CHANGE UP (ref 135–146)
WBC # BLD: 11.99 K/UL — HIGH (ref 4.8–10.8)
WBC # FLD AUTO: 11.99 K/UL — HIGH (ref 4.8–10.8)
ZINC SERPL-MCNC: 65 UG/DL — SIGNIFICANT CHANGE UP (ref 56–134)

## 2020-02-05 PROCEDURE — 36573 INSJ PICC RS&I 5 YR+: CPT

## 2020-02-05 PROCEDURE — 99233 SBSQ HOSP IP/OBS HIGH 50: CPT

## 2020-02-05 RX ORDER — ELECTROLYTE SOLUTION,INJ
1 VIAL (ML) INTRAVENOUS
Refills: 0 | Status: DISCONTINUED | OUTPATIENT
Start: 2020-02-05 | End: 2020-02-05

## 2020-02-05 RX ORDER — I.V. FAT EMULSION 20 G/100ML
1.7 EMULSION INTRAVENOUS
Qty: 100.47 | Refills: 0 | Status: DISCONTINUED | OUTPATIENT
Start: 2020-02-05 | End: 2020-02-05

## 2020-02-05 RX ORDER — HEPARIN SODIUM 5000 [USP'U]/ML
5000 INJECTION INTRAVENOUS; SUBCUTANEOUS EVERY 12 HOURS
Refills: 0 | Status: DISCONTINUED | OUTPATIENT
Start: 2020-02-05 | End: 2020-02-15

## 2020-02-05 RX ADMIN — Medication 70 EACH: at 20:08

## 2020-02-05 RX ADMIN — ERTAPENEM SODIUM 120 MILLIGRAM(S): 1 INJECTION, POWDER, LYOPHILIZED, FOR SOLUTION INTRAMUSCULAR; INTRAVENOUS at 14:58

## 2020-02-05 RX ADMIN — Medication 25 MICROGRAM(S): at 05:05

## 2020-02-05 NOTE — PROCEDURE NOTE - NSPROCDETAILS_GEN_ALL_CORE
location identified, draped/prepped, sterile technique used/ultrasound guidance/sterile dressing applied/Trendelenburg position/supine position/ultrasound assessment/sterile technique, catheter placed

## 2020-02-05 NOTE — PROGRESS NOTE ADULT - ASSESSMENT
Pt is a 64 y/o female with Diverticular Abscess    - IV abx  - Pt for drainage of abscess and PICC by IR today  - NPO for procedure  - FU AM Labs  - DVT/GI Prophylaxis  - will d/w team Pt is a 64 y/o female with Diverticular Abscess    - IV abx  - Pt for drainage of abscess and PICC by IR today  - NPO for procedure  - FU AM Labs  - DVT/GI Prophylaxis  - will d/w team  - Patient can be advanced to Low fiber diet after IR procedure today

## 2020-02-05 NOTE — PROCEDURE NOTE - NSPOSTPRCRAD_GEN_A_CORE
depth of insertion/line adjusted to depth of insertion/line in appropriate postion/fluoroscopy/ultrasound/chest radiograph/postion of catheter

## 2020-02-05 NOTE — PROGRESS NOTE ADULT - SUBJECTIVE AND OBJECTIVE BOX
Pt seen and evaluated at bedside.  Reports feeling well.  Pain decreased.  No N/V.  Patient scheduled for IR drainage and PICC today.  Pickup at 1000.      Vital Signs (24 Hrs):  T(C): 36.8 (20 @ 05:32), Max: 36.8 (20 @ 05:32)  HR: 69 (20 @ 05:32) (69 - 95)  BP: 99/55 (20 @ 05:32) (99/55 - 120/55)  RR: 16 (20 @ 05:32) (16 - 16)  Daily Height in cm: 165.1 (2020 07:58)    Daily Weight in k.2 (2020 07:58)    I&O's Summary    2020 07:01  -  2020 07:00  --------------------------------------------------------  IN: 1415.8 mL / OUT: 0 mL / NET: 1415.8 mL          Gen NAD, A&Ox3  Abd soft, Minimal LLQ tenderness, not distended, no guarding                            13.1   13.93 )-----------( 515      ( 2020 18:59 )             38.9       02-04    138  |  99  |  13  ----------------------------<  101<H>  5.1<H>   |  25  |  0.6<L>    Ca    9.2      2020 06:14  Phos  5.2     02-04  Mg     2.2     02-04    TPro  6.5  /  Alb  x   /  TBili  x   /  DBili  x   /  AST  x   /  ALT  x   /  AlkPhos  x   02-04          PT/INR - ( 2020 15:27 )   PT: 12.90 sec;   INR: 1.12 ratio         PTT - ( 2020 15:27 )  PTT:34.8 sec    Lactate Trend                  Culture Results:   No growth to date. ( @ 07:48)                              < from: CT Abdomen and Pelvis w/ Oral Cont and w/ IV Cont (20 @ 14:08) >  3 cm pelvic abscess most likely related to diverticulitis of the sigmoid colon. Whereas on the prior study it appeared to contain both air and debris, it now appears to contain mostly fluid, but is no greater in size. Mild inflammatory changes in the surrounding fat are unchanged. Several small foci of air are seen in the region but these may represent diverticula versus microperforation.        < end of copied text >

## 2020-02-05 NOTE — PROGRESS NOTE ADULT - SUBJECTIVE AND OBJECTIVE BOX
INTERVAL HPI/OVERNIGHT EVENTS:    SUBJECTIVE: Patient seen and examined at bedside.     no cp, sob, fever  abd pain improving, occurs with eating, mild, crampy, diffuse    OBJECTIVE:    VITAL SIGNS:  Vital Signs Last 24 Hrs  T(C): 36.8 (05 Feb 2020 05:32), Max: 36.8 (05 Feb 2020 05:32)  T(F): 98.3 (05 Feb 2020 05:32), Max: 98.3 (05 Feb 2020 05:32)  HR: 69 (05 Feb 2020 05:32) (69 - 95)  BP: 99/55 (05 Feb 2020 05:32) (99/55 - 115/56)  BP(mean): --  RR: 16 (05 Feb 2020 05:32) (16 - 16)  SpO2: --      PHYSICAL EXAM:    General: NAD  HEENT: NC/AT; PERRL, clear conjunctiva  Neck: supple  Respiratory: CTA b/l  Cardiovascular: +S1/S2; RRR  Abdomen: soft, NT/ND; +BS x4  Extremities: WWP, 2+ peripheral pulses b/l; no LE edema  Skin: normal color and turgor; no rash  Neurological:    MEDICATIONS:  MEDICATIONS  (STANDING):  chlorhexidine 4% Liquid 1 Application(s) Topical <User Schedule>  ertapenem  IVPB 1000 milliGRAM(s) IV Intermittent every 24 hours  ertapenem  IVPB      fat emulsion (Plant Based) 20% Infusion 1.7 Gm/kG/Day (31.397 mL/Hr) IV Continuous <Continuous>  heparin  Injectable 5000 Unit(s) SubCutaneous every 12 hours  levothyroxine 25 MICROGram(s) Oral daily  Parenteral Nutrition - Adult 1 Each (70 mL/Hr) TPN Continuous <Continuous>  Parenteral Nutrition - Adult 1 Each (70 mL/Hr) TPN Continuous <Continuous>    MEDICATIONS  (PRN):  morphine  - Injectable 1 milliGRAM(s) IV Push every 6 hours PRN Moderate Pain (4 - 6)      ALLERGIES:  Allergies    No Known Allergies    Intolerances        LABS:                        12.9   11.99 )-----------( 557      ( 05 Feb 2020 08:44 )             37.5     Hemoglobin: 12.9 g/dL (02-05 @ 08:44)  Hemoglobin: 13.1 g/dL (02-04 @ 18:59)  Hemoglobin: 12.8 g/dL (02-03 @ 07:48)  Hemoglobin: 12.5 g/dL (02-01 @ 06:29)    CBC Full  -  ( 05 Feb 2020 08:44 )  WBC Count : 11.99 K/uL  RBC Count : 3.88 M/uL  Hemoglobin : 12.9 g/dL  Hematocrit : 37.5 %  Platelet Count - Automated : 557 K/uL  Mean Cell Volume : 96.6 fL  Mean Cell Hemoglobin : 33.2 pg  Mean Cell Hemoglobin Concentration : 34.4 g/dL  Auto Neutrophil # : 8.91 K/uL  Auto Lymphocyte # : 1.77 K/uL  Auto Monocyte # : 0.90 K/uL  Auto Eosinophil # : 0.29 K/uL  Auto Basophil # : 0.05 K/uL  Auto Neutrophil % : 74.3 %  Auto Lymphocyte % : 14.8 %  Auto Monocyte % : 7.5 %  Auto Eosinophil % : 2.4 %  Auto Basophil % : 0.4 %    02-05    137  |  97<L>  |  16  ----------------------------<  108<H>  4.5   |  23  |  0.8    Ca    9.3      05 Feb 2020 08:44  Phos  4.7     02-05  Mg     2.0     02-05    TPro  6.7  /  Alb  3.8  /  TBili  <0.2  /  DBili  x   /  AST  26  /  ALT  11  /  AlkPhos  97  02-05    Creatinine Trend: 0.8<--, 0.6<--, 0.6<--, 0.6<--, 0.6<--, 0.7<--  LIVER FUNCTIONS - ( 05 Feb 2020 08:44 )  Alb: 3.8 g/dL / Pro: 6.7 g/dL / ALK PHOS: 97 U/L / ALT: 11 U/L / AST: 26 U/L / GGT: x           PT/INR - ( 04 Feb 2020 15:27 )   PT: 12.90 sec;   INR: 1.12 ratio         PTT - ( 04 Feb 2020 15:27 )  PTT:34.8 sec    hs Troponin:              CSF:                      EKG:   MICROBIOLOGY:    Culture - Blood (collected 03 Feb 2020 07:48)  Source: .Blood None  Preliminary Report (04 Feb 2020 18:01):    No growth to date.      IMAGING:      Labs, imaging, EKG personally reviewed    RADIOLOGY & ADDITIONAL TESTS: Reviewed.

## 2020-02-05 NOTE — PROGRESS NOTE ADULT - SUBJECTIVE AND OBJECTIVE BOX
Interventional Radiology Follow-Up Note      65y Female diagnosed with diverticulitis    O/N:   No complaints offered.    Vitals: T(F): 98.3 (02-05-20 @ 05:32), Max: 98.3 (02-05-20 @ 05:32)  HR: 69 (02-05-20 @ 05:32) (69 - 95)  BP: 99/55 (02-05-20 @ 05:32) (99/55 - 115/56)  RR: 16 (02-05-20 @ 05:32) (16 - 16)  SpO2: --  Wt(kg): --    LABS:                        12.9   11.99 )-----------( 557      ( 05 Feb 2020 08:44 )             37.5     02-05    137  |  97<L>  |  16  ----------------------------<  108<H>  4.5   |  23  |  0.8    Ca    9.3      05 Feb 2020 08:44  Phos  4.7     02-05  Mg     2.0     02-05    TPro  6.7  /  Alb  3.8  /  TBili  <0.2  /  DBili  x   /  AST  26  /  ALT  11  /  AlkPhos  97  02-05    PT/INR - ( 04 Feb 2020 15:27 )   PT: 12.90 sec;   INR: 1.12 ratio    PTT - ( 04 Feb 2020 15:27 )  PTT:34.8 sec    Culture:   output :    PHYSICAL EXAM:  General: Nontoxic, in NAD  Neuro:  Alert & oriented x 3  CV: +S1+S2 regular rate and rhythm  Lung: clear to ausculation bilaterally, respirations nonlabored, good inspiratory effort  Abdomen: soft, NTND. Normactive BS  Extremities: no pedal edema or calf tenderness noted         Impression:  65y Female admitted with DIVERTICULITIS OF INTESTINE WITH ABSCESS.  The patient presents today for PICC and for possible CT-directed abscess drainage, however, on further review of cross-sectional imaging from this admission, CT scans performed (2/3/20, 1/29/20 andstrongly suggest that the "abscess" described is more likely an inflamed diverticulum.  Placing a drain into a diverticulum contiguous with the bowel is not recommended.  Given the leukocytosis is significantly decreased (>21 to 11.99) and given that she is clinically improved, we recommend that she continue with iv antibiotics.  Should this process further evolve to form a discrete abscess distinct from bowel, then IR can consider percutaneous drainage.      Plan:       Please call Interventional Radiology [phone] with any questions, concerns, or issues regarding above. Interventional Radiology Follow-Up Note      65y Female diagnosed with diverticulitis    O/N:   No complaints offered.    Vitals: T(F): 98.3 (02-05-20 @ 05:32), Max: 98.3 (02-05-20 @ 05:32)  HR: 69 (02-05-20 @ 05:32) (69 - 95)  BP: 99/55 (02-05-20 @ 05:32) (99/55 - 115/56)  RR: 16 (02-05-20 @ 05:32) (16 - 16)  SpO2: --  Wt(kg): --    LABS:                        12.9   11.99 )-----------( 557      ( 05 Feb 2020 08:44 )             37.5     02-05    137  |  97<L>  |  16  ----------------------------<  108<H>  4.5   |  23  |  0.8    Ca    9.3      05 Feb 2020 08:44  Phos  4.7     02-05  Mg     2.0     02-05    TPro  6.7  /  Alb  3.8  /  TBili  <0.2  /  DBili  x   /  AST  26  /  ALT  11  /  AlkPhos  97  02-05    PT/INR - ( 04 Feb 2020 15:27 )   PT: 12.90 sec;   INR: 1.12 ratio    PTT - ( 04 Feb 2020 15:27 )  PTT:34.8 sec    Culture:   output :    PHYSICAL EXAM:  General: Nontoxic, in NAD  Neuro:  Alert & oriented x 3  CV: +S1+S2 regular rate and rhythm  Lung: clear to ausculation bilaterally, respirations nonlabored, good inspiratory effort  Abdomen: soft, NTND. Normactive BS  Extremities: no pedal edema or calf tenderness noted         Impression:  65y Female admitted with DIVERTICULITIS OF INTESTINE WITH ABSCESS.  The patient presents today for PICC and for possible CT-directed abscess drainage, however, on further review of cross-sectional imaging from this and the prior admission, CT scans performed (2/3/20 and 1/29/20) strongly suggest that the "abscess" described is more likely an inflamed diverticulum.  Placing a drain into a diverticulum contiguous with the bowel is not recommended.  Given the leukocytosis is significantly decreased (>21 to 11.99) and given that she is clinically improved, we recommend that she continue with iv antibiotics.  Should this process evolve to form a discrete abscess distinct from bowel, then IR can consider percutaneous drainage.      Plan:  PICC only for now.  Case discussed at length with [facility] MD, Dr. Henderson at 14:10 today, with read-back.       Please call Interventional Radiology [phone] with any questions, concerns, or issues regarding above.

## 2020-02-05 NOTE — PROGRESS NOTE ADULT - ASSESSMENT
64F PMHx hypothyroidism, recurrent diverticulitis (1/2020) here with abd pain, found to have diverticulitis with fluid collection.    #Acute Diverticulitis c/b fluid collection, initially thought to be abscess  d/w IR, concern for inflamed diverticulum  monitor without drainage, cont ertapenem for 3 weeks total; clinically improving  f/u gi, surgery  diet as tolerated; pt refusing ppn, f/u nutrition  pain control  bcx ntd  #Hypothyroidism  synthroid 25  #DVT ppx  subq hep    #Progress Note Handoff:  Pending (specify):  Consults_________, Tests________, Test Results_______, Other__f/u gi_______  Family discussion:d/w pt at bedside re: treatment planning  Disposition: Home__x_/SNF___/Other________/Unknown at this time________

## 2020-02-06 LAB
ANION GAP SERPL CALC-SCNC: 15 MMOL/L — HIGH (ref 7–14)
BUN SERPL-MCNC: 18 MG/DL — SIGNIFICANT CHANGE UP (ref 10–20)
CALCIUM SERPL-MCNC: 9 MG/DL — SIGNIFICANT CHANGE UP (ref 8.5–10.1)
CHLORIDE SERPL-SCNC: 98 MMOL/L — SIGNIFICANT CHANGE UP (ref 98–110)
CO2 SERPL-SCNC: 24 MMOL/L — SIGNIFICANT CHANGE UP (ref 17–32)
CREAT SERPL-MCNC: 0.8 MG/DL — SIGNIFICANT CHANGE UP (ref 0.7–1.5)
GLUCOSE SERPL-MCNC: 110 MG/DL — HIGH (ref 70–99)
HCT VFR BLD CALC: 37 % — SIGNIFICANT CHANGE UP (ref 37–47)
HGB BLD-MCNC: 12.4 G/DL — SIGNIFICANT CHANGE UP (ref 12–16)
MAGNESIUM SERPL-MCNC: 1.8 MG/DL — SIGNIFICANT CHANGE UP (ref 1.8–2.4)
MCHC RBC-ENTMCNC: 32.3 PG — HIGH (ref 27–31)
MCHC RBC-ENTMCNC: 33.5 G/DL — SIGNIFICANT CHANGE UP (ref 32–37)
MCV RBC AUTO: 96.4 FL — SIGNIFICANT CHANGE UP (ref 81–99)
NRBC # BLD: 0 /100 WBCS — SIGNIFICANT CHANGE UP (ref 0–0)
PHOSPHATE SERPL-MCNC: 4.5 MG/DL — SIGNIFICANT CHANGE UP (ref 2.1–4.9)
PLATELET # BLD AUTO: 537 K/UL — HIGH (ref 130–400)
POTASSIUM SERPL-MCNC: 4.7 MMOL/L — SIGNIFICANT CHANGE UP (ref 3.5–5)
POTASSIUM SERPL-SCNC: 4.7 MMOL/L — SIGNIFICANT CHANGE UP (ref 3.5–5)
RBC # BLD: 3.84 M/UL — LOW (ref 4.2–5.4)
RBC # FLD: 12 % — SIGNIFICANT CHANGE UP (ref 11.5–14.5)
SODIUM SERPL-SCNC: 137 MMOL/L — SIGNIFICANT CHANGE UP (ref 135–146)
WBC # BLD: 14.09 K/UL — HIGH (ref 4.8–10.8)
WBC # FLD AUTO: 14.09 K/UL — HIGH (ref 4.8–10.8)

## 2020-02-06 PROCEDURE — 99233 SBSQ HOSP IP/OBS HIGH 50: CPT

## 2020-02-06 PROCEDURE — 99231 SBSQ HOSP IP/OBS SF/LOW 25: CPT

## 2020-02-06 RX ORDER — I.V. FAT EMULSION 20 G/100ML
1.7 EMULSION INTRAVENOUS
Qty: 100.47 | Refills: 0 | Status: DISCONTINUED | OUTPATIENT
Start: 2020-02-06 | End: 2020-02-06

## 2020-02-06 RX ORDER — ELECTROLYTE SOLUTION,INJ
1 VIAL (ML) INTRAVENOUS
Refills: 0 | Status: DISCONTINUED | OUTPATIENT
Start: 2020-02-06 | End: 2020-02-06

## 2020-02-06 RX ORDER — ACETAMINOPHEN 500 MG
650 TABLET ORAL EVERY 6 HOURS
Refills: 0 | Status: DISCONTINUED | OUTPATIENT
Start: 2020-02-06 | End: 2020-02-15

## 2020-02-06 RX ADMIN — I.V. FAT EMULSION 31.4 GM/KG/DAY: 20 EMULSION INTRAVENOUS at 21:58

## 2020-02-06 RX ADMIN — ERTAPENEM SODIUM 120 MILLIGRAM(S): 1 INJECTION, POWDER, LYOPHILIZED, FOR SOLUTION INTRAMUSCULAR; INTRAVENOUS at 14:59

## 2020-02-06 RX ADMIN — Medication 25 MICROGRAM(S): at 05:32

## 2020-02-06 RX ADMIN — Medication 1 EACH: at 20:23

## 2020-02-06 RX ADMIN — Medication 650 MILLIGRAM(S): at 22:08

## 2020-02-06 RX ADMIN — Medication 650 MILLIGRAM(S): at 22:40

## 2020-02-06 NOTE — PROGRESS NOTE ADULT - SUBJECTIVE AND OBJECTIVE BOX
INTERVAL HPI/OVERNIGHT EVENTS:    SUBJECTIVE: Patient seen and examined at bedside.     no cp, sob, fever  sl abd pain with eating, L sided, crampy, nonradiating, unchanged with bm    OBJECTIVE:    VITAL SIGNS:  Vital Signs Last 24 Hrs  T(C): 37.4 (06 Feb 2020 06:02), Max: 37.8 (05 Feb 2020 22:22)  T(F): 99.4 (06 Feb 2020 06:02), Max: 100.1 (05 Feb 2020 22:22)  HR: 88 (06 Feb 2020 06:02) (83 - 88)  BP: 98/52 (06 Feb 2020 06:02) (98/52 - 118/57)  BP(mean): --  RR: 16 (06 Feb 2020 06:02) (16 - 16)  SpO2: --      PHYSICAL EXAM:    General: NAD  HEENT: NC/AT; PERRL, clear conjunctiva  Neck: supple  Respiratory: CTA b/l  Cardiovascular: +S1/S2; RRR  Abdomen: soft, NT/ND; +BS x4  Extremities: WWP, 2+ peripheral pulses b/l; no LE edema  Skin: normal color and turgor; no rash  Neurological:    MEDICATIONS:  MEDICATIONS  (STANDING):  chlorhexidine 4% Liquid 1 Application(s) Topical <User Schedule>  ertapenem  IVPB 1000 milliGRAM(s) IV Intermittent every 24 hours  ertapenem  IVPB      heparin  Injectable 5000 Unit(s) SubCutaneous every 12 hours  levothyroxine 25 MICROGram(s) Oral daily  Parenteral Nutrition - Adult 1 Each (70 mL/Hr) TPN Continuous <Continuous>    MEDICATIONS  (PRN):      ALLERGIES:  Allergies    No Known Allergies    Intolerances        LABS:                        12.4   14.09 )-----------( 537      ( 06 Feb 2020 06:25 )             37.0     Hemoglobin: 12.4 g/dL (02-06 @ 06:25)  Hemoglobin: 12.9 g/dL (02-05 @ 08:44)  Hemoglobin: 13.1 g/dL (02-04 @ 18:59)  Hemoglobin: 12.8 g/dL (02-03 @ 07:48)    CBC Full  -  ( 06 Feb 2020 06:25 )  WBC Count : 14.09 K/uL  RBC Count : 3.84 M/uL  Hemoglobin : 12.4 g/dL  Hematocrit : 37.0 %  Platelet Count - Automated : 537 K/uL  Mean Cell Volume : 96.4 fL  Mean Cell Hemoglobin : 32.3 pg  Mean Cell Hemoglobin Concentration : 33.5 g/dL  Auto Neutrophil # : x  Auto Lymphocyte # : x  Auto Monocyte # : x  Auto Eosinophil # : x  Auto Basophil # : x  Auto Neutrophil % : x  Auto Lymphocyte % : x  Auto Monocyte % : x  Auto Eosinophil % : x  Auto Basophil % : x    02-06    137  |  98  |  18  ----------------------------<  110<H>  4.7   |  24  |  0.8    Ca    9.0      06 Feb 2020 06:25  Phos  4.5     02-06  Mg     1.8     02-06    TPro  6.7  /  Alb  3.8  /  TBili  <0.2  /  DBili  x   /  AST  26  /  ALT  11  /  AlkPhos  97  02-05    Creatinine Trend: 0.8<--, 0.8<--, 0.6<--, 0.6<--, 0.6<--, 0.6<--  LIVER FUNCTIONS - ( 05 Feb 2020 08:44 )  Alb: 3.8 g/dL / Pro: 6.7 g/dL / ALK PHOS: 97 U/L / ALT: 11 U/L / AST: 26 U/L / GGT: x           PT/INR - ( 04 Feb 2020 15:27 )   PT: 12.90 sec;   INR: 1.12 ratio         PTT - ( 04 Feb 2020 15:27 )  PTT:34.8 sec    hs Troponin:              CSF:                      EKG:   MICROBIOLOGY:    IMAGING:      Labs, imaging, EKG personally reviewed    RADIOLOGY & ADDITIONAL TESTS: Reviewed.

## 2020-02-06 NOTE — PROGRESS NOTE ADULT - ASSESSMENT
Pt is a 66 y/o female with Diverticular Abscess    - s/p PICC - cont IV ertapenem as per ID  - Patient was advanced to Low fiber diet ; D/C PPN   - no acute surgical intervention; to f/u with Dr. Cole upon D/C

## 2020-02-06 NOTE — PROGRESS NOTE ADULT - ASSESSMENT
64F PMHx hypothyroidism, recurrent diverticulitis (1/2020) here with abd pain, found to have diverticulitis with fluid collection.    #Acute diverticulitis c/b fluid collection, initially thought to be abscess  d/w IR, concern for inflamed diverticulum, no drainage done  picc in place, ertapenem for 3 weeks total per id  f/u gi, surgery  f/u nutrition for ppn  low fiber/residue diet as tolerated  pain control  bcx ntd  #Hypothyroidism  synthroid 25  #DVT ppx  subq hep    #Progress Note Handoff:  Pending (specify):  Consults_________, Tests________, Test Results_______, Other__f/u gi_______  Family discussion:d/w pt at bedside re: treatment planning  Disposition: Home__x_/SNF___/Other________/Unknown at this time________

## 2020-02-06 NOTE — PROGRESS NOTE ADULT - SUBJECTIVE AND OBJECTIVE BOX
S; Pt seen earlier; tolerating full liquids, still with some lower abdominal pain at times, not worse  O:Vital Signs Last 24 Hrs  T(C): 36.6 (06 Feb 2020 13:45), Max: 37.8 (05 Feb 2020 22:22)  T(F): 97.9 (06 Feb 2020 13:45), Max: 100.1 (05 Feb 2020 22:22)  HR: 94 (06 Feb 2020 13:45) (83 - 94)  BP: 112/64 (06 Feb 2020 13:45) (98/52 - 118/57)  BP(mean): --  RR: 16 (06 Feb 2020 13:45) (16 - 16)  SpO2: --    EXAM:  abd: soft, ND, mild lower abdominal tenderness, no rebound/guarding    Labs:  CAPILLARY BLOOD GLUCOSE                              12.4   14.09 )-----------( 537      ( 06 Feb 2020 06:25 )             37.0         02-06    137  |  98  |  18  ----------------------------<  110<H>  4.7   |  24  |  0.8      Calcium, Total Serum: 9.0 mg/dL (02-06-20 @ 06:25)      LFTs:             6.7  | <0.2 | 26       ------------------[97      ( 05 Feb 2020 08:44 )  3.8  | x    | 11          Lipase:x      Amylase:x             Coags:

## 2020-02-06 NOTE — PROGRESS NOTE ADULT - SUBJECTIVE AND OBJECTIVE BOX
IR note from yesterday appreciated  - consequently pt back with PICC line and to get IV antibiotics .  Would have  [facility] order what thry feel is the appropriate anntibiotics  , arrange  orders for home care

## 2020-02-07 LAB
ANION GAP SERPL CALC-SCNC: 15 MMOL/L — HIGH (ref 7–14)
BUN SERPL-MCNC: 22 MG/DL — HIGH (ref 10–20)
CALCIUM SERPL-MCNC: 9.2 MG/DL — SIGNIFICANT CHANGE UP (ref 8.5–10.1)
CHLORIDE SERPL-SCNC: 98 MMOL/L — SIGNIFICANT CHANGE UP (ref 98–110)
CO2 SERPL-SCNC: 25 MMOL/L — SIGNIFICANT CHANGE UP (ref 17–32)
CREAT SERPL-MCNC: 0.7 MG/DL — SIGNIFICANT CHANGE UP (ref 0.7–1.5)
GLUCOSE SERPL-MCNC: 108 MG/DL — HIGH (ref 70–99)
HCT VFR BLD CALC: 38.1 % — SIGNIFICANT CHANGE UP (ref 37–47)
HGB BLD-MCNC: 12.8 G/DL — SIGNIFICANT CHANGE UP (ref 12–16)
MAGNESIUM SERPL-MCNC: 2 MG/DL — SIGNIFICANT CHANGE UP (ref 1.8–2.4)
MCHC RBC-ENTMCNC: 32.7 PG — HIGH (ref 27–31)
MCHC RBC-ENTMCNC: 33.6 G/DL — SIGNIFICANT CHANGE UP (ref 32–37)
MCV RBC AUTO: 97.2 FL — SIGNIFICANT CHANGE UP (ref 81–99)
NRBC # BLD: 0 /100 WBCS — SIGNIFICANT CHANGE UP (ref 0–0)
PHOSPHATE SERPL-MCNC: 4.4 MG/DL — SIGNIFICANT CHANGE UP (ref 2.1–4.9)
PLATELET # BLD AUTO: 572 K/UL — HIGH (ref 130–400)
POTASSIUM SERPL-MCNC: 4.7 MMOL/L — SIGNIFICANT CHANGE UP (ref 3.5–5)
POTASSIUM SERPL-SCNC: 4.7 MMOL/L — SIGNIFICANT CHANGE UP (ref 3.5–5)
RBC # BLD: 3.92 M/UL — LOW (ref 4.2–5.4)
RBC # FLD: 12.1 % — SIGNIFICANT CHANGE UP (ref 11.5–14.5)
SODIUM SERPL-SCNC: 138 MMOL/L — SIGNIFICANT CHANGE UP (ref 135–146)
WBC # BLD: 19.09 K/UL — HIGH (ref 4.8–10.8)
WBC # FLD AUTO: 19.09 K/UL — HIGH (ref 4.8–10.8)

## 2020-02-07 PROCEDURE — 99231 SBSQ HOSP IP/OBS SF/LOW 25: CPT

## 2020-02-07 PROCEDURE — 71045 X-RAY EXAM CHEST 1 VIEW: CPT | Mod: 26

## 2020-02-07 PROCEDURE — 99233 SBSQ HOSP IP/OBS HIGH 50: CPT

## 2020-02-07 RX ORDER — ELECTROLYTE SOLUTION,INJ
1 VIAL (ML) INTRAVENOUS
Refills: 0 | Status: DISCONTINUED | OUTPATIENT
Start: 2020-02-08 | End: 2020-02-08

## 2020-02-07 RX ORDER — ELECTROLYTE SOLUTION,INJ
1 VIAL (ML) INTRAVENOUS
Refills: 0 | Status: DISCONTINUED | OUTPATIENT
Start: 2020-02-07 | End: 2020-02-08

## 2020-02-07 RX ORDER — MEROPENEM 1 G/30ML
1000 INJECTION INTRAVENOUS EVERY 8 HOURS
Refills: 0 | Status: DISCONTINUED | OUTPATIENT
Start: 2020-02-07 | End: 2020-02-15

## 2020-02-07 RX ORDER — I.V. FAT EMULSION 20 G/100ML
1.85 EMULSION INTRAVENOUS
Qty: 100.27 | Refills: 0 | Status: DISCONTINUED | OUTPATIENT
Start: 2020-02-08 | End: 2020-02-08

## 2020-02-07 RX ADMIN — Medication 25 MICROGRAM(S): at 05:41

## 2020-02-07 RX ADMIN — Medication 650 MILLIGRAM(S): at 22:16

## 2020-02-07 RX ADMIN — MEROPENEM 100 MILLIGRAM(S): 1 INJECTION INTRAVENOUS at 14:02

## 2020-02-07 RX ADMIN — Medication 1 EACH: at 20:55

## 2020-02-07 RX ADMIN — Medication 650 MILLIGRAM(S): at 22:53

## 2020-02-07 RX ADMIN — MEROPENEM 100 MILLIGRAM(S): 1 INJECTION INTRAVENOUS at 22:17

## 2020-02-07 NOTE — PROGRESS NOTE ADULT - ASSESSMENT
Abdominal pain no improvement  Diverticulitis with abscess  Increased WBC count    Plan   NPO except meds  Continue IV antibiotics  PPN today  Monitor CBC  Surgical follow up

## 2020-02-07 NOTE — PROGRESS NOTE ADULT - SUBJECTIVE AND OBJECTIVE BOX
HPI:  63 yo F with PMHx of Hypothyroidism, Recurrent Diverticulitis, recently admitted at [facility] and discharged on 01/03/2020 for Diverticulitis failed on outpatient antibiotics, presented with complaint of dull, at times radiating to back left lower quadrant abdominal pain associated with decreased appetite since Sunday, pain currently 8/10, although patient appears comfortable. Patient was seen by GI yesterday, reported to have constipation. Patient denies fever, chills, nausea, vomiting, shortness of breath, last bowel movement this morning, positive flatus. (29 Jan 2020 19:37)    Stats she has pain again    PAST MEDICAL & SURGICAL HISTORY:  Diverticulitis  Hypothyroidism  H/O tubal ligation      REVIEW OF SYSTEMS:    CONSTITUTIONAL: No weakness, fevers or chills  EYES/ENT: No visual changes;  No vertigo or throat pain   NECK: No pain or stiffness  RESPIRATORY: No cough, wheezing, hemoptysis; No shortness of breath  CARDIOVASCULAR: No chest pain or palpitations  GASTROINTESTINAL: Abdominal pain and rectal pain, No nausea, vomiting, or hematemesis; No diarrhea or constipation. No melena or hematochezia.  GENITOURINARY: No dysuria, frequency or hematuria  NEUROLOGICAL: No numbness or weakness  SKIN: No itching, rashes      MEDICATIONS  (STANDING):  acetaminophen   Tablet .. 650 milliGRAM(s) Oral every 6 hours  chlorhexidine 4% Liquid 1 Application(s) Topical <User Schedule>  ertapenem  IVPB 1000 milliGRAM(s) IV Intermittent every 24 hours  fat emulsion (Plant Based) 20% Infusion 1.7 Gm/kG/Day (31.397 mL/Hr) IV Continuous <Continuous>  lactated ringers. 1000 milliLiter(s) (75 mL/Hr) IV Continuous <Continuous>  levothyroxine 25 MICROGram(s) Oral daily  Parenteral Nutrition - Adult 1 Each (70 mL/Hr) TPN Continuous <Continuous>    MEDICATIONS  (PRN):  morphine  - Injectable 1 milliGRAM(s) IV Push every 6 hours PRN Moderate Pain (4 - 6)      Allergies    No Known Allergies    Intolerances        SOCIAL HISTORY:    FAMILY HISTORY:  FH: myocardial infarction: mother  FH: diabetes mellitus: mother      Vital Signs Last 24 Hrs  T(C): 36.3 (07 Feb 2020 13:39), Max: 38.3 (06 Feb 2020 22:11)  T(F): 97.3 (07 Feb 2020 13:39), Max: 100.9 (06 Feb 2020 22:11)  HR: 98 (07 Feb 2020 13:39) (88 - 98)  BP: 111/56 (07 Feb 2020 13:39) (111/55 - 112/60)  BP(mean): --  RR: 16 (07 Feb 2020 05:07) (16 - 16)  SpO2: --    PHYSICAL EXAM:  GENERAL: NAD, well-developed, well nourished, looks stated age  HEAD:  Atraumatic, Normocephalic  EYES: EOMI, PERRLA, conjunctiva and sclera clear  NECK: Supple, No JVD, no bruits, no masses, no thyroid enlargement  ENMT: No tonsillar erythema, exudated or enlargement, moist mucous membranes  CHEST/LUNG: Clear to auscultation bilaterally; No rales, rhonchi or wheezing, no dullness to percussion  HEART: S1,S2 Regular rate and rhythm; No murmurs, rubs, or gallops  ABDOMEN: Soft, tender lower abdomen, nondistended, no rebound tenderness; No palpable masses, no hernias, no organomegaly; Bowel sounds present and normoactive  EXTREMITIES:  2+ peripheral pulses bilaterally and symmetrically, no clubbing, cyanosis, or edema  LYMPH: No lymphadenopathy  PSYCH: AAOx3, normal mood and affect  NEUROLOGY: non-focal, muscle strength 5/5 all extremities, DTRs 2+ symmetrically  SKIN: No rashes or lesions                            12.8   19.09 )-----------( 572      ( 07 Feb 2020 07:00 )             38.1     02-07    138  |  98  |  22<H>  ----------------------------<  108<H>  4.7   |  25  |  0.7    Ca    9.2      07 Feb 2020 06:26  Phos  4.4     02-07  Mg     2.0     02-07                    RADIOLOGY & ADDITIONAL STUDIES:    CT diverticulitis with abscess

## 2020-02-07 NOTE — PROGRESS NOTE ADULT - ASSESSMENT
64F PMHx hypothyroidism, recurrent diverticulitis (1/2020) here with abd pain, found to have diverticulitis with fluid collection.    #Acute diverticulitis c/b fluid collection, initially thought to be abscess  d/w IR, concern for inflamed diverticulum, did not undergo drainage  fever overnight, abd exam unchanged  check bcx, ua, cxr  picc in place  change ertapenem to betty per id  f/u gi, surgery  f/u nutrition for ppn  low fiber/residue diet as tolerated  pain control  bcx ntd  #Hypothyroidism  synthroid 25  #DVT ppx  subq hep    #Progress Note Handoff:  Pending (specify):  Consults_________, Tests________, Test Results_______, Other__f/u gi_______  Family discussion:d/w pt at bedside re: treatment planning  Disposition: Home__x_/SNF___/Other________/Unknown at this time________

## 2020-02-07 NOTE — PROGRESS NOTE ADULT - SUBJECTIVE AND OBJECTIVE BOX
INTERVAL HPI/OVERNIGHT EVENTS:    SUBJECTIVE: Patient seen and examined at bedside.     no cp, sob, +fever  abd pain, intermittent, worsen with eating, nonradiating, crampy    OBJECTIVE:    VITAL SIGNS:  Vital Signs Last 24 Hrs  T(C): 36.5 (07 Feb 2020 05:07), Max: 38.3 (06 Feb 2020 22:11)  T(F): 97.7 (07 Feb 2020 05:07), Max: 100.9 (06 Feb 2020 22:11)  HR: 90 (07 Feb 2020 05:07) (88 - 94)  BP: 112/60 (07 Feb 2020 05:07) (111/55 - 112/64)  BP(mean): --  RR: 16 (07 Feb 2020 05:07) (16 - 16)  SpO2: --      PHYSICAL EXAM:    General: NAD  HEENT: NC/AT; PERRL, clear conjunctiva  Neck: supple  Respiratory: CTA b/l  Cardiovascular: +S1/S2; RRR  Abdomen: soft, sl ttp L side/ND; +BS x4  Extremities: WWP, 2+ peripheral pulses b/l; no LE edema  Skin: normal color and turgor; no rash  Neurological:    MEDICATIONS:  MEDICATIONS  (STANDING):  chlorhexidine 4% Liquid 1 Application(s) Topical <User Schedule>  ertapenem  IVPB 1000 milliGRAM(s) IV Intermittent every 24 hours  ertapenem  IVPB      fat emulsion (Plant Based) 20% Infusion 1.7 Gm/kG/Day (31.397 mL/Hr) IV Continuous <Continuous>  heparin  Injectable 5000 Unit(s) SubCutaneous every 12 hours  levothyroxine 25 MICROGram(s) Oral daily  Parenteral Nutrition - Adult 1 Each (65 mL/Hr) TPN Continuous <Continuous>    MEDICATIONS  (PRN):  acetaminophen   Tablet .. 650 milliGRAM(s) Oral every 6 hours PRN Temp greater or equal to 38C (100.4F)      ALLERGIES:  Allergies    No Known Allergies    Intolerances        LABS:                        12.8   19.09 )-----------( 572      ( 07 Feb 2020 07:00 )             38.1     Hemoglobin: 12.8 g/dL (02-07 @ 07:00)  Hemoglobin: 12.4 g/dL (02-06 @ 06:25)  Hemoglobin: 12.9 g/dL (02-05 @ 08:44)  Hemoglobin: 13.1 g/dL (02-04 @ 18:59)  Hemoglobin: 12.8 g/dL (02-03 @ 07:48)    CBC Full  -  ( 07 Feb 2020 07:00 )  WBC Count : 19.09 K/uL  RBC Count : 3.92 M/uL  Hemoglobin : 12.8 g/dL  Hematocrit : 38.1 %  Platelet Count - Automated : 572 K/uL  Mean Cell Volume : 97.2 fL  Mean Cell Hemoglobin : 32.7 pg  Mean Cell Hemoglobin Concentration : 33.6 g/dL  Auto Neutrophil # : x  Auto Lymphocyte # : x  Auto Monocyte # : x  Auto Eosinophil # : x  Auto Basophil # : x  Auto Neutrophil % : x  Auto Lymphocyte % : x  Auto Monocyte % : x  Auto Eosinophil % : x  Auto Basophil % : x    02-07    138  |  98  |  22<H>  ----------------------------<  108<H>  4.7   |  25  |  0.7    Ca    9.2      07 Feb 2020 06:26  Phos  4.4     02-07  Mg     2.0     02-07      Creatinine Trend: 0.7<--, 0.8<--, 0.8<--, 0.6<--, 0.6<--, 0.6<--        hs Troponin:              CSF:                      EKG:   MICROBIOLOGY:    IMAGING:      Labs, imaging, EKG personally reviewed    RADIOLOGY & ADDITIONAL TESTS: Reviewed.

## 2020-02-07 NOTE — PROGRESS NOTE ADULT - ASSESSMENT
65 year old female presents with diverticulitis with intramural abscess.     Plan:  - f/u labs and VS, pt spiked a fever overnight  - s/p PICC  - switch  Abx to IV Meropenem as per ID  - pt is tolerating low fiber diet; recommend d/c PPN  - no acute surgical intervention; to f/u with Dr. Cole upon D/C    Will d/c with Dr. Cole

## 2020-02-07 NOTE — PROGRESS NOTE ADULT - SUBJECTIVE AND OBJECTIVE BOX
KIM WEISS  65y, Female  Allergy: No Known Allergies      CHIEF COMPLAINT: Diverticulitis (06 Feb 2020 17:00)      INTERVAL EVENTS/HPI  - No acute events overnight  - T(F): , Max: 100.9 (02-06-20 @ 22:11)  - Denies any worsening symptoms  - Tolerating medication    ROS  10 system rebiew - abdal cramps   SOCIAL HISTORY - not relevant     Substance Use (  ) never used  (  ) IVDU (  ) Other:  Tobacco Usage:  (   ) never smoked   (   ) former smoker   (   ) current smoker   Alcohol Usage: (   ) social  (   ) daily use (   ) denies  Sexual History:       FH noncontributory     VITALS:  T(F): 97.7, Max: 100.9 (02-06-20 @ 22:11)  HR: 90  BP: 112/60  RR: 16Vital Signs Last 24 Hrs  T(C): 36.5 (07 Feb 2020 05:07), Max: 38.3 (06 Feb 2020 22:11)  T(F): 97.7 (07 Feb 2020 05:07), Max: 100.9 (06 Feb 2020 22:11)  HR: 90 (07 Feb 2020 05:07) (88 - 94)  BP: 112/60 (07 Feb 2020 05:07) (111/55 - 112/64)  BP(mean): --  RR: 16 (07 Feb 2020 05:07) (16 - 16)  SpO2: --    PHYSICAL EXAM:  Gen: NAD, resting in bed  HEENT: Normocephalic, atraumatic  Neck: supple, no lymphadenopathy  CV: Regular rate & regular rhythm  Lungs: clear   Abdomen: Soft, BS present. mildly tender lower abdomen   Ext: Warm, well perfused. LUE PICC   Neuro: non focal, awake  Skin: no rash, no erythema      TESTS & MEASUREMENTS:                        12.4   14.09 )-----------( 537      ( 06 Feb 2020 06:25 )             37.0     02-06    137  |  98  |  18  ----------------------------<  110<H>  4.7   |  24  |  0.8    Ca    9.0      06 Feb 2020 06:25  Phos  4.5     02-06  Mg     1.8     02-06    TPro  6.7  /  Alb  3.8  /  TBili  <0.2  /  DBili  x   /  AST  26  /  ALT  11  /  AlkPhos  97  02-05      LIVER FUNCTIONS - ( 05 Feb 2020 08:44 )  Alb: 3.8 g/dL / Pro: 6.7 g/dL / ALK PHOS: 97 U/L / ALT: 11 U/L / AST: 26 U/L / GGT: x               Culture - Blood (collected 02-03-20 @ 07:48)  Source: .Blood None  Preliminary Report (02-04-20 @ 18:01):    No growth to date.            INFECTIOUS DISEASES TESTING  Hepatitis C Virus Interpretation: Nonreact (12-30-19 @ 04:30)      RADIOLOGY & ADDITIONAL TESTS:  I have personally reviewed the last Chest xray  CXR      CT      CARDIOLOGY TESTING      MEDICATIONS  chlorhexidine 4% Liquid 1  ertapenem  IVPB 1000  ertapenem  IVPB   fat emulsion (Plant Based) 20% Infusion 1.7  heparin  Injectable 5000  levothyroxine 25  Parenteral Nutrition - Adult 1      ANTIBIOTICS:  ertapenem  IVPB 1000 milliGRAM(s) IV Intermittent every 24 hours  ertapenem  IVPB

## 2020-02-07 NOTE — PROGRESS NOTE ADULT - SUBJECTIVE AND OBJECTIVE BOX
Patient seen & examined in the morning.   She spiked a fever yesterday evening. No fever this AM.  Patient reports several short episodes of abdominal pain since the morning. Pt denies N/V. Pt admits to loose BM and passing flatus.  Patient tolerates low residue diet well.        I&O's Detail          MEDICATIONS  (STANDING):  chlorhexidine 4% Liquid 1 Application(s) Topical <User Schedule>  ertapenem  IVPB 1000 milliGRAM(s) IV Intermittent every 24 hours  ertapenem  IVPB      fat emulsion (Plant Based) 20% Infusion 1.7 Gm/kG/Day (31.397 mL/Hr) IV Continuous <Continuous>  heparin  Injectable 5000 Unit(s) SubCutaneous every 12 hours  levothyroxine 25 MICROGram(s) Oral daily  Parenteral Nutrition - Adult 1 Each (65 mL/Hr) TPN Continuous <Continuous>    MEDICATIONS  (PRN):  acetaminophen   Tablet .. 650 milliGRAM(s) Oral every 6 hours PRN Temp greater or equal to 38C (100.4F)          Vital Signs Last 24 Hrs  T(C): 36.5 (07 Feb 2020 05:07), Max: 38.3 (06 Feb 2020 22:11)  T(F): 97.7 (07 Feb 2020 05:07), Max: 100.9 (06 Feb 2020 22:11)  HR: 90 (07 Feb 2020 05:07) (88 - 94)  BP: 112/60 (07 Feb 2020 05:07) (111/55 - 112/64)  RR: 16 (07 Feb 2020 05:07) (16 - 16)      Physical Exam:  General: conversant in NAD.   Abdomen:  + Bowel sounds, soft, non -distended, mild lower abdominal tenderness,  no rebound/guarding or peritoneal signs.    Extremities:  No LE edema B/L.      LABS:                        12.4   14.09 )-----------( 537      ( 06 Feb 2020 06:25 )             37.0     02-06    137  |  98  |  18  ----------------------------<  110<H>  4.7   |  24  |  0.8    Ca    9.0      06 Feb 2020 06:25  Phos  4.5     02-06  Mg     1.8     02-06              RADIOLOGY & ADDITIONAL STUDIES:

## 2020-02-07 NOTE — CHART NOTE - NSCHARTNOTEFT_GEN_A_CORE
Calorie Count Results:    1 day Calorie Count ordered 2/6 - posted that evening. Dinner was documented at 2/6 - showed consumption of 350 kcal, 13 g protein,    2/7: Breakfast & Lunch documented, pt made NPO by Dinner time. Consumed 445 kcal, 16 g protein on this day. Reports decreased tolerance to po diet today.

## 2020-02-08 LAB
APPEARANCE UR: CLEAR — SIGNIFICANT CHANGE UP
BILIRUB UR-MCNC: NEGATIVE — SIGNIFICANT CHANGE UP
COLOR SPEC: YELLOW — SIGNIFICANT CHANGE UP
CULTURE RESULTS: SIGNIFICANT CHANGE UP
DIFF PNL FLD: NEGATIVE — SIGNIFICANT CHANGE UP
EPI CELLS # UR: ABNORMAL /HPF
GLUCOSE UR QL: NEGATIVE MG/DL — SIGNIFICANT CHANGE UP
HCT VFR BLD CALC: 35.7 % — LOW (ref 37–47)
HGB BLD-MCNC: 12.2 G/DL — SIGNIFICANT CHANGE UP (ref 12–16)
KETONES UR-MCNC: NEGATIVE — SIGNIFICANT CHANGE UP
LEUKOCYTE ESTERASE UR-ACNC: NEGATIVE — SIGNIFICANT CHANGE UP
MCHC RBC-ENTMCNC: 32.6 PG — HIGH (ref 27–31)
MCHC RBC-ENTMCNC: 34.2 G/DL — SIGNIFICANT CHANGE UP (ref 32–37)
MCV RBC AUTO: 95.5 FL — SIGNIFICANT CHANGE UP (ref 81–99)
NITRITE UR-MCNC: NEGATIVE — SIGNIFICANT CHANGE UP
NRBC # BLD: 0 /100 WBCS — SIGNIFICANT CHANGE UP (ref 0–0)
PH UR: 6.5 — SIGNIFICANT CHANGE UP (ref 5–8)
PLATELET # BLD AUTO: 565 K/UL — HIGH (ref 130–400)
PROT UR-MCNC: 30 MG/DL
RBC # BLD: 3.74 M/UL — LOW (ref 4.2–5.4)
RBC # FLD: 12 % — SIGNIFICANT CHANGE UP (ref 11.5–14.5)
SP GR SPEC: 1.02 — SIGNIFICANT CHANGE UP (ref 1.01–1.03)
SPECIMEN SOURCE: SIGNIFICANT CHANGE UP
UROBILINOGEN FLD QL: 0.2 MG/DL — SIGNIFICANT CHANGE UP (ref 0.2–0.2)
WBC # BLD: 21.58 K/UL — HIGH (ref 4.8–10.8)
WBC # FLD AUTO: 21.58 K/UL — HIGH (ref 4.8–10.8)
WBC UR QL: SIGNIFICANT CHANGE UP /HPF

## 2020-02-08 PROCEDURE — 99233 SBSQ HOSP IP/OBS HIGH 50: CPT

## 2020-02-08 RX ADMIN — MEROPENEM 100 MILLIGRAM(S): 1 INJECTION INTRAVENOUS at 21:31

## 2020-02-08 RX ADMIN — Medication 650 MILLIGRAM(S): at 22:01

## 2020-02-08 RX ADMIN — Medication 1 EACH: at 20:22

## 2020-02-08 RX ADMIN — MEROPENEM 100 MILLIGRAM(S): 1 INJECTION INTRAVENOUS at 05:44

## 2020-02-08 RX ADMIN — I.V. FAT EMULSION 31.33 GM/KG/DAY: 20 EMULSION INTRAVENOUS at 20:23

## 2020-02-08 RX ADMIN — Medication 1 EACH: at 14:03

## 2020-02-08 RX ADMIN — Medication 25 MICROGRAM(S): at 05:43

## 2020-02-08 RX ADMIN — MEROPENEM 100 MILLIGRAM(S): 1 INJECTION INTRAVENOUS at 14:03

## 2020-02-08 RX ADMIN — Medication 650 MILLIGRAM(S): at 05:43

## 2020-02-08 NOTE — PROGRESS NOTE ADULT - SUBJECTIVE AND OBJECTIVE BOX
.  Patient seen & examined with Dr. Ferro.  No acute events noted overnight.  Patient reports intermittent left lower abdominal pain well controlled with pain medications.    Patient NPO with sips/chips and currently on PPN.     Patient reports loose BM and passing flatus.  Esiq=805.9.  Patient denies subjective fever, chills, tremors, N/V, CP or SOB.           MEDICATIONS  (STANDING):  chlorhexidine 4% Liquid 1 Application(s) Topical <User Schedule>  fat emulsion (Plant Based) 20% Infusion 1.85 Gm/kG/Day (31.334 mL/Hr) IV Continuous <Continuous>  heparin  Injectable 5000 Unit(s) SubCutaneous every 12 hours  levothyroxine 25 MICROGram(s) Oral daily  meropenem  IVPB 1000 milliGRAM(s) IV Intermittent every 8 hours  Parenteral Nutrition - Adult 1 Each (65 mL/Hr) TPN Continuous <Continuous>  Parenteral Nutrition - Adult 1 Each (65 mL/Hr) TPN Continuous <Continuous>          Vital Signs Last 24 Hrs  T(C): 37.8 (08 Feb 2020 14:48), Max: 38.3 (07 Feb 2020 21:17)  T(F): 100 (08 Feb 2020 14:48), Max: 100.9 (07 Feb 2020 21:17)  HR: 101 (08 Feb 2020 14:48) (90 - 101)  BP: 112/91 (08 Feb 2020 14:48) (104/50 - 115/59)  RR: 16 (08 Feb 2020 14:48) (16 - 16)          Physical Exam:  General:  WD, WN, conversant in NAD.   Neck:  Supple, No JVD.  Chest:  Clear to auscultation bilaterally, Equal expansion bilaterally, equal breath sounds, No W/R/R.  CV:  S1 & S2, RRR, No M/R/G.   Abdomen:  + Bowel sounds, soft, no distention, mild LLQ tenderness with palpation,  no rebound/guarding or peritoneal signs.    Extremities:  No C/C/E,  No calf tenderness B/L.            LABS:                        12.2   21.58 )-----------( 565      ( 08 Feb 2020 04:30 )             35.7     02-07    138  |  98  |  22<H>  ----------------------------<  108<H>  4.7   |  25  |  0.7    Ca    9.2      07 Feb 2020 06:26  Phos  4.4     02-07  Mg     2.0     02-07

## 2020-02-08 NOTE — PROGRESS NOTE ADULT - ASSESSMENT
64F PMHx hypothyroidism, recurrent diverticulitis (1/2020) here with abd pain, found to have diverticulitis with fluid collection.    #Acute diverticulitis c/b fluid collection, initially thought to be abscess  d/w IR, concern for inflamed diverticulum, did not undergo drainage  again febrile overnight, abd exam unchanged  check bcx, ua, cxr  picc in place  change ertapenem to betty per id  f/u gi, surgery  f/u nutrition for ppn  low fiber/residue diet as tolerated  pain control  bcx ntd  #Hypothyroidism  synthroid 25  #DVT ppx  subq hep    #Progress Note Handoff:  Pending (specify):  Consults_________, Tests________, Test Results_______, Other__f/u gi_______  Family discussion:d/w pt at bedside re: treatment planning  Disposition: Home__x_/SNF___/Other________/Unknown at this time________ 64F PMHx hypothyroidism, recurrent diverticulitis (1/2020) here with abd pain, found to have diverticulitis with fluid collection.    #Acute diverticulitis c/b fluid collection, initially thought to be abscess  d/w IR, concern for inflamed diverticulum, did not undergo drainage  again febrile overnight, abd exam unchanged  f/u bcx, ua; cxr neg  npo per gi, ongoing ppn  cont betty, picc in place  f/u gi, surgery  pain control  #Hypothyroidism  synthroid 25  #DVT ppx  subq hep    #Progress Note Handoff:  Pending (specify):  Consults_________, Tests________, Test Results_______, Other__f/u gi_______  Family discussion:d/w pt at bedside re: treatment planning  Disposition: Home__x_/SNF___/Other________/Unknown at this time________

## 2020-02-08 NOTE — PROGRESS NOTE ADULT - ASSESSMENT
Impression:  Acute Sigmoid Diverticulitis with Intramural Abscess.         Plan:  - NPO / Sips & Chips / Continue PPN.   - Ivabx -- Meropenum via PICC line.  ID following.   - Repeat CBC.   - DVT prophylaxis.  - GI prophylaxis.  - Pain medication PRN.   - Patient seen & examined and discussed with Dr. Ferro.

## 2020-02-08 NOTE — PROGRESS NOTE ADULT - SUBJECTIVE AND OBJECTIVE BOX
INTERVAL HPI/OVERNIGHT EVENTS:    SUBJECTIVE: Patient seen and examined at bedside.     no cp, sob, +fever  abd pain, worsens with po, crampy, left sided, dull nonradiating    OBJECTIVE:    VITAL SIGNS:  Vital Signs Last 24 Hrs  T(C): 37.5 (08 Feb 2020 06:37), Max: 38.3 (07 Feb 2020 21:17)  T(F): 99.5 (08 Feb 2020 06:37), Max: 100.9 (07 Feb 2020 21:17)  HR: 90 (08 Feb 2020 05:56) (90 - 101)  BP: 115/59 (08 Feb 2020 05:56) (104/50 - 115/59)  BP(mean): --  RR: 16 (08 Feb 2020 05:56) (16 - 16)  SpO2: --      PHYSICAL EXAM:    General: NAD  HEENT: NC/AT; PERRL, clear conjunctiva  Neck: supple  Respiratory: CTA b/l  Cardiovascular: +S1/S2; RRR  Abdomen: soft, NT/ND; +BS x4  Extremities: WWP, 2+ peripheral pulses b/l; no LE edema  Skin: normal color and turgor; no rash  Neurological:    MEDICATIONS:  MEDICATIONS  (STANDING):  chlorhexidine 4% Liquid 1 Application(s) Topical <User Schedule>  fat emulsion (Plant Based) 20% Infusion 1.85 Gm/kG/Day (31.334 mL/Hr) IV Continuous <Continuous>  heparin  Injectable 5000 Unit(s) SubCutaneous every 12 hours  levothyroxine 25 MICROGram(s) Oral daily  meropenem  IVPB 1000 milliGRAM(s) IV Intermittent every 8 hours  Parenteral Nutrition - Adult 1 Each (65 mL/Hr) TPN Continuous <Continuous>  Parenteral Nutrition - Adult 1 Each (65 mL/Hr) TPN Continuous <Continuous>    MEDICATIONS  (PRN):  acetaminophen   Tablet .. 650 milliGRAM(s) Oral every 6 hours PRN Temp greater or equal to 38C (100.4F)      ALLERGIES:  Allergies    No Known Allergies    Intolerances        LABS:                        12.8   19.09 )-----------( 572      ( 07 Feb 2020 07:00 )             38.1     Hemoglobin: 12.8 g/dL (02-07 @ 07:00)  Hemoglobin: 12.4 g/dL (02-06 @ 06:25)  Hemoglobin: 12.9 g/dL (02-05 @ 08:44)  Hemoglobin: 13.1 g/dL (02-04 @ 18:59)    CBC Full  -  ( 07 Feb 2020 07:00 )  WBC Count : 19.09 K/uL  RBC Count : 3.92 M/uL  Hemoglobin : 12.8 g/dL  Hematocrit : 38.1 %  Platelet Count - Automated : 572 K/uL  Mean Cell Volume : 97.2 fL  Mean Cell Hemoglobin : 32.7 pg  Mean Cell Hemoglobin Concentration : 33.6 g/dL  Auto Neutrophil # : x  Auto Lymphocyte # : x  Auto Monocyte # : x  Auto Eosinophil # : x  Auto Basophil # : x  Auto Neutrophil % : x  Auto Lymphocyte % : x  Auto Monocyte % : x  Auto Eosinophil % : x  Auto Basophil % : x    02-07    138  |  98  |  22<H>  ----------------------------<  108<H>  4.7   |  25  |  0.7    Ca    9.2      07 Feb 2020 06:26  Phos  4.4     02-07  Mg     2.0     02-07      Creatinine Trend: 0.7<--, 0.8<--, 0.8<--, 0.6<--, 0.6<--, 0.6<--        hs Troponin:              CSF:                      EKG:   MICROBIOLOGY:    IMAGING:      Labs, imaging, EKG personally reviewed    RADIOLOGY & ADDITIONAL TESTS: Reviewed.

## 2020-02-09 LAB
ALBUMIN SERPL ELPH-MCNC: 3.5 G/DL — SIGNIFICANT CHANGE UP (ref 3.5–5.2)
ALP SERPL-CCNC: 118 U/L — HIGH (ref 30–115)
ALT FLD-CCNC: 15 U/L — SIGNIFICANT CHANGE UP (ref 0–41)
ANION GAP SERPL CALC-SCNC: 16 MMOL/L — HIGH (ref 7–14)
APPEARANCE UR: CLEAR — SIGNIFICANT CHANGE UP
AST SERPL-CCNC: 27 U/L — SIGNIFICANT CHANGE UP (ref 0–41)
BACTERIA # UR AUTO: ABNORMAL
BILIRUB SERPL-MCNC: <0.2 MG/DL — SIGNIFICANT CHANGE UP (ref 0.2–1.2)
BILIRUB UR-MCNC: NEGATIVE — SIGNIFICANT CHANGE UP
BUN SERPL-MCNC: 19 MG/DL — SIGNIFICANT CHANGE UP (ref 10–20)
CALCIUM SERPL-MCNC: 9.2 MG/DL — SIGNIFICANT CHANGE UP (ref 8.5–10.1)
CHLORIDE SERPL-SCNC: 99 MMOL/L — SIGNIFICANT CHANGE UP (ref 98–110)
CO2 SERPL-SCNC: 24 MMOL/L — SIGNIFICANT CHANGE UP (ref 17–32)
COLOR SPEC: YELLOW — SIGNIFICANT CHANGE UP
CREAT SERPL-MCNC: 0.6 MG/DL — LOW (ref 0.7–1.5)
DIFF PNL FLD: NEGATIVE — SIGNIFICANT CHANGE UP
EPI CELLS # UR: ABNORMAL /HPF
GLUCOSE SERPL-MCNC: 133 MG/DL — HIGH (ref 70–99)
GLUCOSE UR QL: NEGATIVE MG/DL — SIGNIFICANT CHANGE UP
HCT VFR BLD CALC: 33.8 % — LOW (ref 37–47)
HGB BLD-MCNC: 11.6 G/DL — LOW (ref 12–16)
KETONES UR-MCNC: NEGATIVE — SIGNIFICANT CHANGE UP
LEUKOCYTE ESTERASE UR-ACNC: NEGATIVE — SIGNIFICANT CHANGE UP
LG PLATELETS BLD QL AUTO: SLIGHT — SIGNIFICANT CHANGE UP
MAGNESIUM SERPL-MCNC: 2.1 MG/DL — SIGNIFICANT CHANGE UP (ref 1.8–2.4)
MANUAL SMEAR VERIFICATION: SIGNIFICANT CHANGE UP
MCHC RBC-ENTMCNC: 33 PG — HIGH (ref 27–31)
MCHC RBC-ENTMCNC: 34.3 G/DL — SIGNIFICANT CHANGE UP (ref 32–37)
MCV RBC AUTO: 96 FL — SIGNIFICANT CHANGE UP (ref 81–99)
NITRITE UR-MCNC: NEGATIVE — SIGNIFICANT CHANGE UP
NRBC # BLD: 0 /100 WBCS — SIGNIFICANT CHANGE UP (ref 0–0)
NRBC # BLD: 0 /100 — SIGNIFICANT CHANGE UP (ref 0–0)
PH UR: 6.5 — SIGNIFICANT CHANGE UP (ref 5–8)
PHOSPHATE SERPL-MCNC: 3.6 MG/DL — SIGNIFICANT CHANGE UP (ref 2.1–4.9)
PLAT MORPH BLD: NORMAL — SIGNIFICANT CHANGE UP
PLATELET # BLD AUTO: 554 K/UL — HIGH (ref 130–400)
PLATELET COUNT - ESTIMATE: ABNORMAL
POTASSIUM SERPL-MCNC: 4.2 MMOL/L — SIGNIFICANT CHANGE UP (ref 3.5–5)
POTASSIUM SERPL-SCNC: 4.2 MMOL/L — SIGNIFICANT CHANGE UP (ref 3.5–5)
PROT SERPL-MCNC: 6.4 G/DL — SIGNIFICANT CHANGE UP (ref 6–8)
PROT UR-MCNC: 30 MG/DL
RBC # BLD: 3.52 M/UL — LOW (ref 4.2–5.4)
RBC # FLD: 12.1 % — SIGNIFICANT CHANGE UP (ref 11.5–14.5)
RBC BLD AUTO: NORMAL — SIGNIFICANT CHANGE UP
SODIUM SERPL-SCNC: 139 MMOL/L — SIGNIFICANT CHANGE UP (ref 135–146)
SP GR SPEC: 1.02 — SIGNIFICANT CHANGE UP (ref 1.01–1.03)
UROBILINOGEN FLD QL: 0.2 MG/DL — SIGNIFICANT CHANGE UP (ref 0.2–0.2)
WBC # BLD: 26.65 K/UL — HIGH (ref 4.8–10.8)
WBC # FLD AUTO: 26.65 K/UL — HIGH (ref 4.8–10.8)
WBC UR QL: SIGNIFICANT CHANGE UP /HPF

## 2020-02-09 PROCEDURE — 99231 SBSQ HOSP IP/OBS SF/LOW 25: CPT

## 2020-02-09 PROCEDURE — 74177 CT ABD & PELVIS W/CONTRAST: CPT | Mod: 26

## 2020-02-09 PROCEDURE — 99233 SBSQ HOSP IP/OBS HIGH 50: CPT

## 2020-02-09 RX ORDER — ELECTROLYTE SOLUTION,INJ
1 VIAL (ML) INTRAVENOUS
Refills: 0 | Status: DISCONTINUED | OUTPATIENT
Start: 2020-02-09 | End: 2020-02-09

## 2020-02-09 RX ADMIN — Medication 650 MILLIGRAM(S): at 22:10

## 2020-02-09 RX ADMIN — MEROPENEM 100 MILLIGRAM(S): 1 INJECTION INTRAVENOUS at 13:22

## 2020-02-09 RX ADMIN — CHLORHEXIDINE GLUCONATE 1 APPLICATION(S): 213 SOLUTION TOPICAL at 05:15

## 2020-02-09 RX ADMIN — Medication 1 EACH: at 20:16

## 2020-02-09 RX ADMIN — Medication 650 MILLIGRAM(S): at 23:00

## 2020-02-09 RX ADMIN — Medication 650 MILLIGRAM(S): at 07:09

## 2020-02-09 RX ADMIN — Medication 650 MILLIGRAM(S): at 11:17

## 2020-02-09 RX ADMIN — Medication 650 MILLIGRAM(S): at 12:46

## 2020-02-09 RX ADMIN — Medication 650 MILLIGRAM(S): at 00:57

## 2020-02-09 RX ADMIN — Medication 650 MILLIGRAM(S): at 05:14

## 2020-02-09 RX ADMIN — MEROPENEM 100 MILLIGRAM(S): 1 INJECTION INTRAVENOUS at 21:32

## 2020-02-09 RX ADMIN — MEROPENEM 100 MILLIGRAM(S): 1 INJECTION INTRAVENOUS at 05:14

## 2020-02-09 RX ADMIN — Medication 25 MICROGRAM(S): at 05:15

## 2020-02-09 NOTE — PROGRESS NOTE ADULT - SUBJECTIVE AND OBJECTIVE BOX
INTERVAL HPI/OVERNIGHT EVENTS:    SUBJECTIVE: Patient seen and examined at bedside.     no cp, sob, +fever  +abd pain, crampy, worsen with po, nonradiating    OBJECTIVE:    VITAL SIGNS:  Vital Signs Last 24 Hrs  T(C): 37.7 (2020 10:03), Max: 38.3 (2020 21:47)  T(F): 99.9 (2020 10:03), Max: 101 (2020 21:47)  HR: 110 (2020 05:36) (99 - 110)  BP: 118/55 (2020 05:36) (112/59 - 118/55)  BP(mean): --  RR: 16 (2020 05:36) (16 - 16)  SpO2: --      PHYSICAL EXAM:    General: NAD  HEENT: NC/AT; PERRL, clear conjunctiva  Neck: supple  Respiratory: CTA b/l  Cardiovascular: +S1/S2; RRR  Abdomen: soft, ttp llq/ND; +BS x4  Extremities: WWP, 2+ peripheral pulses b/l; no LE edema  Skin: normal color and turgor; no rash  Neurological:    MEDICATIONS:  MEDICATIONS  (STANDING):  chlorhexidine 4% Liquid 1 Application(s) Topical <User Schedule>  fat emulsion (Plant Based) 20% Infusion 1.85 Gm/kG/Day (31.334 mL/Hr) IV Continuous <Continuous>  heparin  Injectable 5000 Unit(s) SubCutaneous every 12 hours  levothyroxine 25 MICROGram(s) Oral daily  meropenem  IVPB 1000 milliGRAM(s) IV Intermittent every 8 hours  Parenteral Nutrition - Adult 1 Each (65 mL/Hr) TPN Continuous <Continuous>  Parenteral Nutrition - Adult 1 Each (65 mL/Hr) TPN Continuous <Continuous>    MEDICATIONS  (PRN):  acetaminophen   Tablet .. 650 milliGRAM(s) Oral every 6 hours PRN Temp greater or equal to 38C (100.4F)      ALLERGIES:  Allergies    No Known Allergies    Intolerances        LABS:                        12.2   21.58 )-----------( 565      ( 2020 04:30 )             35.7     Hemoglobin: 12.2 g/dL (-08 @ 04:30)  Hemoglobin: 12.8 g/dL ( @ 07:00)  Hemoglobin: 12.4 g/dL ( @ 06:25)  Hemoglobin: 12.9 g/dL ( @ 08:44)  Hemoglobin: 13.1 g/dL ( @ 18:59)    CBC Full  -  ( 2020 04:30 )  WBC Count : 21.58 K/uL  RBC Count : 3.74 M/uL  Hemoglobin : 12.2 g/dL  Hematocrit : 35.7 %  Platelet Count - Automated : 565 K/uL  Mean Cell Volume : 95.5 fL  Mean Cell Hemoglobin : 32.6 pg  Mean Cell Hemoglobin Concentration : 34.2 g/dL  Auto Neutrophil # : x  Auto Lymphocyte # : x  Auto Monocyte # : x  Auto Eosinophil # : x  Auto Basophil # : x  Auto Neutrophil % : x  Auto Lymphocyte % : x  Auto Monocyte % : x  Auto Eosinophil % : x  Auto Basophil % : x        139  |  99  |  19  ----------------------------<  133<H>  4.2   |  24  |  0.6<L>    Ca    9.2      2020 08:11  Phos  3.6       Mg     2.1         TPro  6.4  /  Alb  3.5  /  TBili  <0.2  /  DBili  x   /  AST  27  /  ALT  15  /  AlkPhos  118<H>      Creatinine Trend: 0.6<--, 0.7<--, 0.8<--, 0.8<--, 0.6<--, 0.6<--  LIVER FUNCTIONS - ( 2020 08:11 )  Alb: 3.5 g/dL / Pro: 6.4 g/dL / ALK PHOS: 118 U/L / ALT: 15 U/L / AST: 27 U/L / GGT: x               hs Troponin:            Urinalysis Basic - ( 2020 10:30 )    Color: Yellow / Appearance: Clear / S.025 / pH: x  Gluc: x / Ketone: Negative  / Bili: Negative / Urobili: 0.2 mg/dL   Blood: x / Protein: 30 mg/dL / Nitrite: Negative   Leuk Esterase: Negative / RBC: x / WBC 1-2 /HPF   Sq Epi: x / Non Sq Epi: Occasional /HPF / Bacteria: Few      CSF:                      EKG:   MICROBIOLOGY:    Culture - Blood (collected 2020 15:25)  Source: .Blood None  Preliminary Report (2020 23:01):    No growth to date.    Culture - Blood (collected 2020 15:25)  Source: .Blood None  Preliminary Report (2020 23:):    No growth to date.      IMAGING:      Labs, imaging, EKG personally reviewed    RADIOLOGY & ADDITIONAL TESTS: Reviewed.

## 2020-02-09 NOTE — PROGRESS NOTE ADULT - ASSESSMENT
Impression:  Acute Sigmoid Diverticulitis with Intramural Abscess.         Plan:  - NPO / Sips & Chips / Continue PPN.   - Ivabx -- Continue Meropenum via PICC line.  ID following.   - F/U am labs once available and will repeat CBC.   - Re-assured patient that it is not unlikely to have elevated WBC count and low grade fevers with Diverticulitis and abscess.  Encouraged OOB and ambulation & encouraged incentive spirometer.    - DVT prophylaxis.  - GI prophylaxis.  - Pain medication PRN.   - Continue medical mgt per medical team.  - Case discussed with Dr. Cole.

## 2020-02-09 NOTE — PROGRESS NOTE ADULT - ASSESSMENT
64F PMHx hypothyroidism, recurrent diverticulitis (1/2020) here with abd pain, found to have diverticulitis with fluid collection.    #Acute diverticulitis c/b fluid collection, initially thought to be abscess  d/w IR, concern for inflamed diverticulum, did not undergo drainage  again febrile overnight  bcx neg  npo  ppn  repeat ct abd  cont betty, picc in place  f/u gi, surgery  pain control  #Hypothyroidism  synthroid 25  #DVT ppx  subq hep    #Progress Note Handoff:  Pending (specify):  Consults_________, Tests________, Test Results_______, Other__f/u gi_______  Family discussion:d/w pt at bedside re: treatment planning  Disposition: Home__x_/SNF___/Other________/Unknown at this time________

## 2020-02-09 NOTE — PROGRESS NOTE ADULT - SUBJECTIVE AND OBJECTIVE BOX
.  Patient seen & examined.  No acute events noted overnight.  Patient reports continued gas type abdominal pains and fever to 101 overnight.   Patient tolerates sips & chips.  Pt. on PPN.  Reports increased Flatus and small loose mucous BM.  Patient denies chills, tremors, N/V/D, CP or SOB.         MEDICATIONS  (STANDING):  chlorhexidine 4% Liquid 1 Application(s) Topical <User Schedule>  fat emulsion (Plant Based) 20% Infusion 1.85 Gm/kG/Day (31.334 mL/Hr) IV Continuous <Continuous>  heparin  Injectable 5000 Unit(s) SubCutaneous every 12 hours  levothyroxine 25 MICROGram(s) Oral daily  meropenem  IVPB 1000 milliGRAM(s) IV Intermittent every 8 hours  Parenteral Nutrition - Adult 1 Each (65 mL/Hr) TPN Continuous <Continuous>    MEDICATIONS  (PRN):  acetaminophen   Tablet .. 650 milliGRAM(s) Oral every 6 hours PRN Temp greater or equal to 38C (100.4F)          Vital Signs Last 24 Hrs  T(C): 37.8 (2020 05:36), Max: 38.3 (2020 21:47)  T(F): 100.1 (2020 05:36), Max: 101 (2020 21:47)  HR: 110 (2020 05:36) (99 - 110)  BP: 118/55 (2020 05:36) (112/59 - 118/55)  RR: 16 (2020 05:36) (16 - 16)          Physical Exam:  General:  WD, WN, conversant in NAD.   Neck:  Supple, No JVD.  Chest:  Clear to auscultation bilaterally, Equal expansion bilaterally, equal breath sounds, No W/R/R.  CV:  S1 & S2, RRR, No M/R/G.   Abdomen:  + Bowel sounds, soft, no distention, Mild Lt sided abd tenderness with palpation,  No rebound/guarding or peritoneal signs.    Extremities:  No C/C/E,  No calf tenderness B/L.            LABS:     CBC pending from this morning.                       12.2   21.58 )-----------( 565      ( 2020 04:30 )             35.7           Urinalysis Basic - ( 2020 16:10 )    Color: Yellow / Appearance: Clear / S.020 / pH: x  Gluc: x / Ketone: Negative  / Bili: Negative / Urobili: 0.2 mg/dL   Blood: x / Protein: 30 mg/dL / Nitrite: Negative   Leuk Esterase: Negative / RBC: x / WBC 1-2 /HPF   Sq Epi: x / Non Sq Epi: Occasional /HPF / Bacteria: x              RADIOLOGY & ADDITIONAL STUDIES:

## 2020-02-10 LAB
ANION GAP SERPL CALC-SCNC: 17 MMOL/L — HIGH (ref 7–14)
APTT BLD: 33.9 SEC — SIGNIFICANT CHANGE UP (ref 27–39.2)
BLD GP AB SCN SERPL QL: SIGNIFICANT CHANGE UP
BUN SERPL-MCNC: 21 MG/DL — HIGH (ref 10–20)
CALCIUM SERPL-MCNC: 8.8 MG/DL — SIGNIFICANT CHANGE UP (ref 8.5–10.1)
CHLORIDE SERPL-SCNC: 100 MMOL/L — SIGNIFICANT CHANGE UP (ref 98–110)
CO2 SERPL-SCNC: 22 MMOL/L — SIGNIFICANT CHANGE UP (ref 17–32)
CREAT SERPL-MCNC: 0.5 MG/DL — LOW (ref 0.7–1.5)
GLUCOSE BLDC GLUCOMTR-MCNC: 105 MG/DL — HIGH (ref 70–99)
GLUCOSE BLDC GLUCOMTR-MCNC: 129 MG/DL — HIGH (ref 70–99)
GLUCOSE SERPL-MCNC: 130 MG/DL — HIGH (ref 70–99)
HCT VFR BLD CALC: 32.5 % — LOW (ref 37–47)
HGB BLD-MCNC: 11 G/DL — LOW (ref 12–16)
INR BLD: 1.36 RATIO — HIGH (ref 0.65–1.3)
MAGNESIUM SERPL-MCNC: 2.1 MG/DL — SIGNIFICANT CHANGE UP (ref 1.8–2.4)
MCHC RBC-ENTMCNC: 32.5 PG — HIGH (ref 27–31)
MCHC RBC-ENTMCNC: 33.8 G/DL — SIGNIFICANT CHANGE UP (ref 32–37)
MCV RBC AUTO: 96.2 FL — SIGNIFICANT CHANGE UP (ref 81–99)
NRBC # BLD: 0 /100 WBCS — SIGNIFICANT CHANGE UP (ref 0–0)
PHOSPHATE SERPL-MCNC: 3.2 MG/DL — SIGNIFICANT CHANGE UP (ref 2.1–4.9)
PLATELET # BLD AUTO: 567 K/UL — HIGH (ref 130–400)
POTASSIUM SERPL-MCNC: 4.1 MMOL/L — SIGNIFICANT CHANGE UP (ref 3.5–5)
POTASSIUM SERPL-SCNC: 4.1 MMOL/L — SIGNIFICANT CHANGE UP (ref 3.5–5)
PROTHROM AB SERPL-ACNC: 15.6 SEC — HIGH (ref 9.95–12.87)
RBC # BLD: 3.38 M/UL — LOW (ref 4.2–5.4)
RBC # FLD: 12.1 % — SIGNIFICANT CHANGE UP (ref 11.5–14.5)
SODIUM SERPL-SCNC: 139 MMOL/L — SIGNIFICANT CHANGE UP (ref 135–146)
WBC # BLD: 20.88 K/UL — HIGH (ref 4.8–10.8)
WBC # FLD AUTO: 20.88 K/UL — HIGH (ref 4.8–10.8)

## 2020-02-10 PROCEDURE — 99233 SBSQ HOSP IP/OBS HIGH 50: CPT

## 2020-02-10 RX ORDER — MORPHINE SULFATE 50 MG/1
2 CAPSULE, EXTENDED RELEASE ORAL
Refills: 0 | Status: DISCONTINUED | OUTPATIENT
Start: 2020-02-10 | End: 2020-02-15

## 2020-02-10 RX ORDER — ACETAMINOPHEN 500 MG
650 TABLET ORAL ONCE
Refills: 0 | Status: COMPLETED | OUTPATIENT
Start: 2020-02-10 | End: 2020-02-10

## 2020-02-10 RX ORDER — ELECTROLYTE SOLUTION,INJ
1 VIAL (ML) INTRAVENOUS
Refills: 0 | Status: DISCONTINUED | OUTPATIENT
Start: 2020-02-10 | End: 2020-02-10

## 2020-02-10 RX ADMIN — MEROPENEM 100 MILLIGRAM(S): 1 INJECTION INTRAVENOUS at 06:15

## 2020-02-10 RX ADMIN — Medication 650 MILLIGRAM(S): at 07:29

## 2020-02-10 RX ADMIN — MORPHINE SULFATE 2 MILLIGRAM(S): 50 CAPSULE, EXTENDED RELEASE ORAL at 11:15

## 2020-02-10 RX ADMIN — Medication 650 MILLIGRAM(S): at 13:12

## 2020-02-10 RX ADMIN — MEROPENEM 100 MILLIGRAM(S): 1 INJECTION INTRAVENOUS at 21:02

## 2020-02-10 RX ADMIN — MORPHINE SULFATE 2 MILLIGRAM(S): 50 CAPSULE, EXTENDED RELEASE ORAL at 10:54

## 2020-02-10 RX ADMIN — Medication 650 MILLIGRAM(S): at 15:49

## 2020-02-10 RX ADMIN — MEROPENEM 100 MILLIGRAM(S): 1 INJECTION INTRAVENOUS at 13:16

## 2020-02-10 RX ADMIN — Medication 650 MILLIGRAM(S): at 06:15

## 2020-02-10 RX ADMIN — Medication 1 EACH: at 20:52

## 2020-02-10 RX ADMIN — Medication 25 MICROGRAM(S): at 06:16

## 2020-02-10 NOTE — PROGRESS NOTE ADULT - SUBJECTIVE AND OBJECTIVE BOX
Patient seen & examined in the morning. Patient spiked a fever overnight.  Patient complains of continued gas type abdominal pains.  Pt admits to small mucous BM, + flatus. Pt is NPO, on TPN. Pt denies N/V.             MEDICATIONS  (STANDING):  chlorhexidine 4% Liquid 1 Application(s) Topical <User Schedule>  heparin  Injectable 5000 Unit(s) SubCutaneous every 12 hours  levothyroxine 25 MICROGram(s) Oral daily  meropenem  IVPB 1000 milliGRAM(s) IV Intermittent every 8 hours  Parenteral Nutrition - Adult 1 Each (65 mL/Hr) TPN Continuous <Continuous>    MEDICATIONS  (PRN):  acetaminophen   Tablet .. 650 milliGRAM(s) Oral every 6 hours PRN Temp greater or equal to 38C (100.4F)          Vital Signs Last 24 Hrs  T(C): 37.1 (10 Feb 2020 08:15), Max: 38.2 (2020 22:07)  T(F): 98.8 (10 Feb 2020 08:15), Max: 100.8 (2020 22:07)  HR: 102 (10 Feb 2020 05:24) (92 - 102)  BP: 103/53 (10 Feb 2020 05:24) (90/53 - 111/70)  RR: 16 (10 Feb 2020 05:24) (16 - 16)      Physical Exam:  General: conversant in NAD.   Abdomen: soft, no distention, mild LLQ tenderness,  no rebound/guarding or peritoneal signs.    Extremities:  No LE edema B/L.      LABS:                        11.0   20.88 )-----------( 567      ( 10 Feb 2020 09:52 )             32.5     02-    139  |  99  |  19  ----------------------------<  133<H>  4.2   |  24  |  0.6<L>    Ca    9.2      2020 08:11  Phos  3.6     02-  Mg     2.1     02-09    TPro  6.4  /  Alb  3.5  /  TBili  <0.2  /  DBili  x   /  AST  27  /  ALT  15  /  AlkPhos  118<H>  02-09      Urinalysis Basic - ( 2020 10:30 )    Color: Yellow / Appearance: Clear / S.025 / pH: x  Gluc: x / Ketone: Negative  / Bili: Negative / Urobili: 0.2 mg/dL   Blood: x / Protein: 30 mg/dL / Nitrite: Negative   Leuk Esterase: Negative / RBC: x / WBC 1-2 /HPF   Sq Epi: x / Non Sq Epi: Occasional /HPF / Bacteria: Few          RADIOLOGY & ADDITIONAL STUDIES:    CT Abdomen and Pelvis w/ IV Cont (20 @ 17:26)   EXAM:  CT ABDOMEN AND PELVIS IC            PROCEDURE DATE:  2020            INTERPRETATION:  CLINICAL STATEMENT: Diverticulitis      TECHNIQUE: Contiguous CT images were obtained of the abdomen and pelvis.  Intravenous Contrast:  Intravenous contrast administered.    Oral contrast: was not administered.        COMPARISON:  CT abdomen pelvis dated 2/3/2020    FINDINGS:    LOWER CHEST: There is mild bibasilar subsegmental atelectasis. No short interval change to a 4 mm right lower lobe pulmonary nodule (4/39).    LIVER: Unremarkable.     SPLEEN: Unremarkable.    PANCREAS: Diffusely scattered pancreatic calcifications compatible with sequelae of chronic pancreatitis. No main duct dilatation or evidence for acute pancreatitis    GALLBLADDER AND BILIARY TREE: Unremarkable CT appearance of the gallbladder. No biliary ductal dilatation.    ADRENALS: Unremarkable.    KIDNEYS: Symmetric pattern of renal enhancement. No hydronephrosis.    LYMPH NODES: There are no enlarged abdominal or pelvic lymph nodes.    VASCULATURE: Normal caliber abdominal aorta with atherosclerotic changes.    BOWEL: There is been interval increase in size of a perirectal abscess situated predominantly posterior to the rectosigmoid junction as well as coursing along the left of the mid rectum. The abscess measures approximately 6.5 x 5.2 x 5.9 cm. Previously this measured up to 4 cm craniocaudad. A portion of the abscess abuts the very posterior of the vaginal wall. Air within the vagina limits the exclusion of underlying fistula. Redemonstration of associated adjacent fat stranding. No bowel obstruction. Unremarkable appendix.    PERITONEUM/RETROPERITONEUM/MESENTERY: There is no ascites or pneumoperitoneum. Abscess as above.    PELVIC VISCERA: Air withinthe vagina is nonspecific. Unremarkable .    BONES AND SOFT TISSUES: No acute osseous abnormality is noted.       IMPRESSION:    Interval increase in size of a perirectal abscess now measuring up to 6.5 cm as detailed above.  A few small locules of air are noted within the vagina for which there is limited evaluation on this exam to exclude underlying colovaginal fistula. If this is clinically suspected, more dedicated imaging can be obtained.                  EMMETT EUGENE M.D., ATTENDING RADIOLOGIST  This document has been electronically signed. Feb 10 2020  7:23AM

## 2020-02-10 NOTE — PROGRESS NOTE ADULT - ASSESSMENT
64F PMHx hypothyroidism, recurrent diverticulitis (1/2020) here with abd pain, found to have diverticulitis with fluid collection.    #Acute diverticulitis c/b possible abscess  d/w IR, abscess thought to represent diverticulum, did not undergo drainage  repeat ct 2/9 with increasing abscess; ?colovaginal fistula  to d/w sx re: need for acute intervention  increased wbc, ongoing fevers  bcx neg 2/7, 2/3; repeat  npo  ppn  cont betty, picc in place  pain control  #Hypothyroidism  synthroid 25  #DVT ppx  subq hep    #Progress Note Handoff:  Pending (specify):  Consults_________, Tests________, Test Results_______, Other__f/u gi_______  Family discussion:d/w pt at bedside re: treatment planning  Disposition: Home__x_/SNF___/Other________/Unknown at this time________

## 2020-02-10 NOTE — PROGRESS NOTE ADULT - ASSESSMENT
Patient is a 65y old  Female who presents with a chief complaint of Diverticulitis.    # Diverticulitis of large intestine with abscess without bleeding. - NO drainage per IR ,Remains febrile and incr wbc on abx   # Sepsis ( Persistent Fever + Leukocytosis)      would recommend:    1. Please Drain the Abscess since it is worsening, increased  in size as per Repeat CT scan and also patient having persistent fever  2. It would be difficult to clear the infection with Antibiotic Alone  3. Monitor WBC  count  4. Continue Meropenem for now  5. Monitor Temp. and c/w supportive care        - spent more than 35 minutes on total encounter; Patient is a 65y old  Female who presents with a chief complaint of Diverticulitis.    # Diverticulitis of large intestine with abscess  - NO drainage per IR ,Remains febrile and incr wbc on abx and Also Repeat CT scan as of 2/9/20 shows Interval increase in size of a perirectal abscess  # Sepsis ( Persistent Fever + Leukocytosis)      would recommend:    1. Please Drain the Abscess since it is worsening, increased  in size as per Repeat CT scan as of 2/9/20 and also patient having persistent fever and Leukocytosis   2. It would be difficult to clear the infection with Antibiotic Alone  3. Monitor WBC  count  4. Continue Meropenem for now  5. Monitor Temp. and c/w supportive care  6. NPO, IVF and pain management          - spent more than 35 minutes on total encounter;

## 2020-02-10 NOTE — PROGRESS NOTE ADULT - SUBJECTIVE AND OBJECTIVE BOX
REVIEW OF SYSTEMS: All other review systems are negative        Vital Signs Last 24 Hrs  T(C): 36.4 (10 Feb 2020 20:45), Max: 39.4 (10 Feb 2020 15:41)  T(F): 97.6 (10 Feb 2020 20:45), Max: 102.9 (10 Feb 2020 15:41)  HR: 81 (10 Feb 2020 20:45) (81 - 120)  BP: 96/54 (10 Feb 2020 20:45) (96/54 - 148/65)  BP(mean): --  RR: 16 (10 Feb 2020 20:45) (16 - 16)  SpO2: --        PHYSICAL EXAM:  GENERAL: Not in distress  CHEST/LUNG: Clear to percussion bilaterally  HEART: Regular rate and rhythm; No murmurs, rubs, or gallops  ABDOMEN: Soft, Nontender, Nondistended; Bowel sounds present  EXTREMITIES:  2+ Peripheral Pulses, No clubbing, cyanosis, or edema  LYMPH: No lymphadenopathy noted  SKIN: No rashes or lesions      MEDICATIONS  (STANDING):  chlorhexidine 4% Liquid 1 Application(s) Topical <User Schedule>  heparin  Injectable 5000 Unit(s) SubCutaneous every 12 hours  levothyroxine 25 MICROGram(s) Oral daily  meropenem  IVPB 1000 milliGRAM(s) IV Intermittent every 8 hours  Parenteral Nutrition - Adult 1 Each (65 mL/Hr) TPN Continuous <Continuous>  Parenteral Nutrition - Adult 1 Each (65 mL/Hr) TPN Continuous <Continuous>    MEDICATIONS  (PRN):  acetaminophen   Tablet .. 650 milliGRAM(s) Oral every 6 hours PRN Temp greater or equal to 38C (100.4F)  morphine  - Injectable 2 milliGRAM(s) IV Push every 3 hours PRN Severe Pain (7 - 10)        Allergies    No Known Allergies          LABS:                        11.0   20.88 )-----------( 567      ( 10 Feb 2020 09:52 )             32.5         02-10    139  |  100  |  21<H>  ----------------------------<  130<H>  4.1   |  22  |  0.5<L>    Ca    8.8      10 Feb 2020 09:52  Phos  3.2     02-10  Mg     2.1     02-10    TPro  6.4  /  Alb  3.5  /  TBili  <0.2  /  DBili  x   /  AST  27  /  ALT  15  /  AlkPhos  118<H>  02-09    PT/INR - ( 10 Feb 2020 21:35 )   PT: 15.60 sec;   INR: 1.36 ratio    PTT - ( 10 Feb 2020 21:35 )  PTT:33.9 sec      Urinalysis Basic - ( 2020 10:30 )  Color: Yellow / Appearance: Clear / S.025 / pH: x  Gluc: x / Ketone: Negative  / Bili: Negative / Urobili: 0.2 mg/dL   Blood: x / Protein: 30 mg/dL / Nitrite: Negative   Leuk Esterase: Negative / RBC: x / WBC 1-2 /HPF   Sq Epi: x / Non Sq Epi: Occasional /HPF / Bacteria: Few      CAPILLARY BLOOD GLUCOSE  POCT Blood Glucose.: 105 mg/dL (10 Feb 2020 21:00)  POCT Blood Glucose.: 129 mg/dL (10 Feb 2020 17:08)        RADIOLOGY & ADDITIONAL TESTS:    < from: CT Abdomen and Pelvis w/ IV Cont (20 @ 17:26) >  Interval increase in size of a perirectal abscess now measuring up to 6.5 cm as detailed above.  A few small locules of air are noted within the vagina for which there is limited evaluation on this exam to exclude underlying colovaginal fistula. If this is clinically suspected, more dedicated imaging can be obtained.        MICROBIOLOGY DATA:    Urine Microscopic-Add On (NC) (20 @ 10:30)    White Blood Cell - Urine: 1-2 /HPF    Bacteria: Few    Epithelial Cells: Occasional /HPF      Urine Microscopic-Add On (NC) (20 @ 16:10)    Epithelial Cells: Occasional /HPF    White Blood Cell - Urine: 1-2 /HPF        Culture - Blood (20 @ 15:25)    Specimen Source: .Blood None    Culture Results:   No growth to date.        Culture - Blood (20 @ 15:25)    Specimen Source: .Blood None    Culture Results:   No growth to date. Patient is seen and examined at the bed side, is afebrile now but still spiking fever.         REVIEW OF SYSTEMS: All other review systems are negative        Vital Signs Last 24 Hrs  T(C): 36.4 (10 Feb 2020 20:45), Max: 39.4 (10 Feb 2020 15:41)  T(F): 97.6 (10 Feb 2020 20:45), Max: 102.9 (10 Feb 2020 15:41)  HR: 81 (10 Feb 2020 20:45) (81 - 120)  BP: 96/54 (10 Feb 2020 20:45) (96/54 - 148/65)  BP(mean): --  RR: 16 (10 Feb 2020 20:45) (16 - 16)  SpO2: --        PHYSICAL EXAM:  GENERAL: Not in distress  CHEST/LUNG:  Air entry bilaterally  HEART: s1 and s2 present   ABDOMEN:  Nontender and Nondistended  EXTREMITIES:  No pedal  edema  CNS: Awake AND Alert        MEDICATIONS  (STANDING):  chlorhexidine 4% Liquid 1 Application(s) Topical <User Schedule>  heparin  Injectable 5000 Unit(s) SubCutaneous every 12 hours  levothyroxine 25 MICROGram(s) Oral daily  meropenem  IVPB 1000 milliGRAM(s) IV Intermittent every 8 hours  Parenteral Nutrition - Adult 1 Each (65 mL/Hr) TPN Continuous <Continuous>  Parenteral Nutrition - Adult 1 Each (65 mL/Hr) TPN Continuous <Continuous>    MEDICATIONS  (PRN):  acetaminophen   Tablet .. 650 milliGRAM(s) Oral every 6 hours PRN Temp greater or equal to 38C (100.4F)  morphine  - Injectable 2 milliGRAM(s) IV Push every 3 hours PRN Severe Pain (7 - 10)        Allergies    No Known Allergies          LABS:                        11.0   20.88 )-----------( 567      ( 10 Feb 2020 09:52 )             32.5         02-10    139  |  100  |  21<H>  ----------------------------<  130<H>  4.1   |  22  |  0.5<L>    Ca    8.8      10 Feb 2020 09:52  Phos  3.2     02-10  Mg     2.1     02-10    TPro  6.4  /  Alb  3.5  /  TBili  <0.2  /  DBili  x   /  AST  27  /  ALT  15  /  AlkPhos  118<H>      PT/INR - ( 10 Feb 2020 21:35 )   PT: 15.60 sec;   INR: 1.36 ratio    PTT - ( 10 Feb 2020 21:35 )  PTT:33.9 sec      Urinalysis Basic - ( 2020 10:30 )  Color: Yellow / Appearance: Clear / S.025 / pH: x  Gluc: x / Ketone: Negative  / Bili: Negative / Urobili: 0.2 mg/dL   Blood: x / Protein: 30 mg/dL / Nitrite: Negative   Leuk Esterase: Negative / RBC: x / WBC 1-2 /HPF   Sq Epi: x / Non Sq Epi: Occasional /HPF / Bacteria: Few      CAPILLARY BLOOD GLUCOSE  POCT Blood Glucose.: 105 mg/dL (10 Feb 2020 21:00)  POCT Blood Glucose.: 129 mg/dL (10 Feb 2020 17:08)        RADIOLOGY & ADDITIONAL TESTS:    < from: CT Abdomen and Pelvis w/ IV Cont (20 @ 17:26) >  Interval increase in size of a perirectal abscess now measuring up to 6.5 cm as detailed above.  A few small locules of air are noted within the vagina for which there is limited evaluation on this exam to exclude underlying colovaginal fistula. If this is clinically suspected, more dedicated imaging can be obtained.        < from: CT Abdomen and Pelvis w/ IV Cont (20 @ 16:29) >  Colonic diverticulosis and diverticulitis is again noted. Compared with the previous study there is increased wall thickening around a diverticulum seen along the posterior margin of the sigmoid colon. (Series 2, image [id number]. There is increased gas and fluid within this diverticulum. The findings are compatible with an intramural abscess in this location.    < end of copied text >      MICROBIOLOGY DATA:    Urine Microscopic-Add On (NC) (20 @ 10:30)    White Blood Cell - Urine: 1-2 /HPF    Bacteria: Few    Epithelial Cells: Occasional /HPF      Urine Microscopic-Add On (NC) (20 @ 16:10)    Epithelial Cells: Occasional /HPF    White Blood Cell - Urine: 1-2 /HPF        Culture - Blood (20 @ 15:25)    Specimen Source: .Blood None    Culture Results:   No growth to date.        Culture - Blood (20 @ 15:25)    Specimen Source: .Blood None    Culture Results:   No growth to date.

## 2020-02-10 NOTE — CHART NOTE - NSCHARTNOTEFT_GEN_A_CORE
Patient seen at bedside, resting comfortably.    All questions and concerns addressed during visit.     Patient encouraged to contact PA with any further issues.     Still febrile, c/w IV ABX and PPN.  Surgery following

## 2020-02-10 NOTE — PROGRESS NOTE ADULT - ASSESSMENT
65 year old female with acute sigmoid diverticulitis with Intramural Abscess.  CT A/P shows interval increase in size of a perirectal abscess measuring up to 6.5 cm.  WBC down from 26 to 20 today.      Plan:  - continue  NPO    - c/w IV Abx - Continue Meropenum via PICC line.  ID following.   - f/u labs  - Pain medication PRN  - Encourage OOB      Will d/w  with Dr. Cole.

## 2020-02-10 NOTE — PROGRESS NOTE ADULT - SUBJECTIVE AND OBJECTIVE BOX
INTERVAL HPI/OVERNIGHT EVENTS:    SUBJECTIVE: Patient seen and examined at bedside.     no cp, sob, +fever  abd pain, LLQ, worsen to touch, sharp, nonradiating  OBJECTIVE:    VITAL SIGNS:  Vital Signs Last 24 Hrs  T(C): 37.1 (10 Feb 2020 08:15), Max: 38.2 (2020 22:07)  T(F): 98.8 (10 Feb 2020 08:15), Max: 100.8 (2020 22:07)  HR: 102 (10 Feb 2020 05:24) (92 - 102)  BP: 103/53 (10 Feb 2020 05:24) (90/53 - 111/70)  BP(mean): --  RR: 16 (10 Feb 2020 05:24) (16 - 16)  SpO2: --      PHYSICAL EXAM:    General: NAD  HEENT: NC/AT; PERRL, clear conjunctiva  Neck: supple  Respiratory: CTA b/l  Cardiovascular: +S1/S2; RRR  Abdomen: soft, NT/ND; +BS x4  Extremities: WWP, 2+ peripheral pulses b/l; no LE edema  Skin: normal color and turgor; no rash  Neurological:    MEDICATIONS:  MEDICATIONS  (STANDING):  chlorhexidine 4% Liquid 1 Application(s) Topical <User Schedule>  heparin  Injectable 5000 Unit(s) SubCutaneous every 12 hours  levothyroxine 25 MICROGram(s) Oral daily  meropenem  IVPB 1000 milliGRAM(s) IV Intermittent every 8 hours  Parenteral Nutrition - Adult 1 Each (65 mL/Hr) TPN Continuous <Continuous>    MEDICATIONS  (PRN):  acetaminophen   Tablet .. 650 milliGRAM(s) Oral every 6 hours PRN Temp greater or equal to 38C (100.4F)  morphine  - Injectable 2 milliGRAM(s) IV Push every 3 hours PRN Severe Pain (7 - 10)      ALLERGIES:  Allergies    No Known Allergies    Intolerances        LABS:                        11.0   20.88 )-----------( 567      ( 10 Feb 2020 09:52 )             32.5     Hemoglobin: 11.0 g/dL (02-10 @ 09:52)  Hemoglobin: 11.6 g/dL ( @ 15:11)  Hemoglobin: 12.2 g/dL ( @ 04:30)  Hemoglobin: 12.8 g/dL ( @ 07:00)  Hemoglobin: 12.4 g/dL ( @ 06:25)    CBC Full  -  ( 10 Feb 2020 09:52 )  WBC Count : 20.88 K/uL  RBC Count : 3.38 M/uL  Hemoglobin : 11.0 g/dL  Hematocrit : 32.5 %  Platelet Count - Automated : 567 K/uL  Mean Cell Volume : 96.2 fL  Mean Cell Hemoglobin : 32.5 pg  Mean Cell Hemoglobin Concentration : 33.8 g/dL  Auto Neutrophil # : x  Auto Lymphocyte # : x  Auto Monocyte # : x  Auto Eosinophil # : x  Auto Basophil # : x  Auto Neutrophil % : x  Auto Lymphocyte % : x  Auto Monocyte % : x  Auto Eosinophil % : x  Auto Basophil % : x    02-10    139  |  100  |  21<H>  ----------------------------<  130<H>  4.1   |  22  |  0.5<L>    Ca    8.8      10 Feb 2020 09:52  Phos  3.2     02-10  Mg     2.1     02-10    TPro  6.4  /  Alb  3.5  /  TBili  <0.2  /  DBili  x   /  AST  27  /  ALT  15  /  AlkPhos  118<H>      Creatinine Trend: 0.5<--, 0.6<--, 0.7<--, 0.8<--, 0.8<--, 0.6<--  LIVER FUNCTIONS - ( 2020 08:11 )  Alb: 3.5 g/dL / Pro: 6.4 g/dL / ALK PHOS: 118 U/L / ALT: 15 U/L / AST: 27 U/L / GGT: x               hs Troponin:            Urinalysis Basic - ( 2020 10:30 )    Color: Yellow / Appearance: Clear / S.025 / pH: x  Gluc: x / Ketone: Negative  / Bili: Negative / Urobili: 0.2 mg/dL   Blood: x / Protein: 30 mg/dL / Nitrite: Negative   Leuk Esterase: Negative / RBC: x / WBC 1-2 /HPF   Sq Epi: x / Non Sq Epi: Occasional /HPF / Bacteria: Few      CSF:                      EKG:   MICROBIOLOGY:    Culture - Blood (collected 2020 15:25)  Source: .Blood None  Preliminary Report (2020 23:01):    No growth to date.    Culture - Blood (collected 2020 15:25)  Source: .Blood None  Preliminary Report (2020 23:01):    No growth to date.      IMAGING:      Labs, imaging, EKG personally reviewed    RADIOLOGY & ADDITIONAL TESTS: Reviewed.

## 2020-02-11 ENCOUNTER — TRANSCRIPTION ENCOUNTER (OUTPATIENT)
Age: 65
End: 2020-02-11

## 2020-02-11 DIAGNOSIS — A41.9 SEPSIS, UNSPECIFIED ORGANISM: ICD-10-CM

## 2020-02-11 LAB
ANION GAP SERPL CALC-SCNC: 18 MMOL/L — HIGH (ref 7–14)
BUN SERPL-MCNC: 22 MG/DL — HIGH (ref 10–20)
CALCIUM SERPL-MCNC: 9.1 MG/DL — SIGNIFICANT CHANGE UP (ref 8.5–10.1)
CHLORIDE SERPL-SCNC: 101 MMOL/L — SIGNIFICANT CHANGE UP (ref 98–110)
CO2 SERPL-SCNC: 23 MMOL/L — SIGNIFICANT CHANGE UP (ref 17–32)
CREAT SERPL-MCNC: 0.6 MG/DL — LOW (ref 0.7–1.5)
GLUCOSE SERPL-MCNC: 117 MG/DL — HIGH (ref 70–99)
HCT VFR BLD CALC: 33.1 % — LOW (ref 37–47)
HGB BLD-MCNC: 11 G/DL — LOW (ref 12–16)
MAGNESIUM SERPL-MCNC: 2.1 MG/DL — SIGNIFICANT CHANGE UP (ref 1.8–2.4)
MCHC RBC-ENTMCNC: 32 PG — HIGH (ref 27–31)
MCHC RBC-ENTMCNC: 33.2 G/DL — SIGNIFICANT CHANGE UP (ref 32–37)
MCV RBC AUTO: 96.2 FL — SIGNIFICANT CHANGE UP (ref 81–99)
NRBC # BLD: 0 /100 WBCS — SIGNIFICANT CHANGE UP (ref 0–0)
PHOSPHATE SERPL-MCNC: 3.2 MG/DL — SIGNIFICANT CHANGE UP (ref 2.1–4.9)
PLATELET # BLD AUTO: 591 K/UL — HIGH (ref 130–400)
POTASSIUM SERPL-MCNC: 4 MMOL/L — SIGNIFICANT CHANGE UP (ref 3.5–5)
POTASSIUM SERPL-SCNC: 4 MMOL/L — SIGNIFICANT CHANGE UP (ref 3.5–5)
RBC # BLD: 3.44 M/UL — LOW (ref 4.2–5.4)
RBC # FLD: 12.1 % — SIGNIFICANT CHANGE UP (ref 11.5–14.5)
SODIUM SERPL-SCNC: 142 MMOL/L — SIGNIFICANT CHANGE UP (ref 135–146)
WBC # BLD: 14.07 K/UL — HIGH (ref 4.8–10.8)
WBC # FLD AUTO: 14.07 K/UL — HIGH (ref 4.8–10.8)

## 2020-02-11 PROCEDURE — 99233 SBSQ HOSP IP/OBS HIGH 50: CPT

## 2020-02-11 PROCEDURE — 99231 SBSQ HOSP IP/OBS SF/LOW 25: CPT

## 2020-02-11 RX ORDER — ELECTROLYTE SOLUTION,INJ
1 VIAL (ML) INTRAVENOUS
Qty: 0 | Refills: 0 | DISCHARGE
Start: 2020-02-11

## 2020-02-11 RX ORDER — LEVOTHYROXINE SODIUM 125 MCG
1 TABLET ORAL
Qty: 0 | Refills: 0 | DISCHARGE

## 2020-02-11 RX ORDER — LEVOTHYROXINE SODIUM 125 MCG
1 TABLET ORAL
Qty: 0 | Refills: 0 | DISCHARGE
Start: 2020-02-11

## 2020-02-11 RX ORDER — I.V. FAT EMULSION 20 G/100ML
1.85 EMULSION INTRAVENOUS
Qty: 100.27 | Refills: 0 | Status: DISCONTINUED | OUTPATIENT
Start: 2020-02-11 | End: 2020-02-11

## 2020-02-11 RX ORDER — ELECTROLYTE SOLUTION,INJ
1 VIAL (ML) INTRAVENOUS
Refills: 0 | Status: DISCONTINUED | OUTPATIENT
Start: 2020-02-11 | End: 2020-02-11

## 2020-02-11 RX ORDER — MEROPENEM 1 G/30ML
1000 INJECTION INTRAVENOUS
Qty: 0 | Refills: 0 | DISCHARGE
Start: 2020-02-11

## 2020-02-11 RX ADMIN — Medication 25 MICROGRAM(S): at 05:11

## 2020-02-11 RX ADMIN — I.V. FAT EMULSION 31.33 GM/KG/DAY: 20 EMULSION INTRAVENOUS at 20:45

## 2020-02-11 RX ADMIN — MEROPENEM 100 MILLIGRAM(S): 1 INJECTION INTRAVENOUS at 21:08

## 2020-02-11 RX ADMIN — MEROPENEM 100 MILLIGRAM(S): 1 INJECTION INTRAVENOUS at 13:47

## 2020-02-11 RX ADMIN — Medication 1 EACH: at 20:45

## 2020-02-11 RX ADMIN — MEROPENEM 100 MILLIGRAM(S): 1 INJECTION INTRAVENOUS at 05:10

## 2020-02-11 RX ADMIN — Medication 650 MILLIGRAM(S): at 05:15

## 2020-02-11 RX ADMIN — Medication 650 MILLIGRAM(S): at 05:45

## 2020-02-11 NOTE — PROGRESS NOTE ADULT - SUBJECTIVE AND OBJECTIVE BOX
KIM WEISS  65y, Female  Allergy: No Known Allergies      CHIEF COMPLAINT: Diverticulitis (13 Feb 2020 17:13)      INTERVAL EVENTS/HPI  - No acute events overnight  - T(F): , Max: 98.5 (02-14-20 @ 05:28)  - Denies any worsening symptoms  - Tolerating medication    ROS  10 system review - neg     SOCIAL HISTORY - not relevant     Substance Use (  ) never used  (  ) IVDU (  ) Other:  Tobacco Usage:  (   ) never smoked   (   ) former smoker   (   ) current smoker   Alcohol Usage: (   ) social  (   ) daily use (   ) denies  Sexual History:       FH noncontributory     VITALS:  T(F): 98.5, Max: 98.5 (02-14-20 @ 05:28)  HR: 90  BP: 109/58  RR: 16Vital Signs Last 24 Hrs  T(C): 36.9 (14 Feb 2020 05:28), Max: 36.9 (14 Feb 2020 05:28)  T(F): 98.5 (14 Feb 2020 05:28), Max: 98.5 (14 Feb 2020 05:28)  HR: 90 (14 Feb 2020 05:28) (80 - 90)  BP: 109/58 (14 Feb 2020 05:28) (109/58 - 125/58)  BP(mean): --  RR: 16 (14 Feb 2020 05:28) (16 - 16)  SpO2: --    PHYSICAL EXAM:  Gen: NAD, resting in bed  HEENT: Normocephalic, atraumatic  Neck: supple, no lymphadenopathy  CV: s1 s 2 +   Lungs: clear   Abdomen: Soft, BS present.  Ext: Warm, well perfused  Neuro: non focal, awake  Skin: no rash, no erythema      TESTS & MEASUREMENTS:                        11.3   10.09 )-----------( 653      ( 13 Feb 2020 08:18 )             34.0     02-13    143  |  105  |  23<H>  ----------------------------<  104<H>  4.3   |  26  |  0.6<L>    Ca    8.9      13 Feb 2020 08:18  Phos  3.4     02-13  Mg     2.1     02-13    TPro  6.0  /  Alb  3.2<L>  /  TBili  <0.2  /  DBili  x   /  AST  28  /  ALT  27  /  AlkPhos  170<H>  02-13    eGFR if Non African American: 94 mL/min/1.73M2 (02-13-20 @ 08:18)  eGFR if : 109 mL/min/1.73M2 (02-13-20 @ 08:18)    LIVER FUNCTIONS - ( 13 Feb 2020 08:18 )  Alb: 3.2 g/dL / Pro: 6.0 g/dL / ALK PHOS: 170 U/L / ALT: 27 U/L / AST: 28 U/L / GGT: x               Culture - Blood (collected 02-11-20 @ 08:00)  Source: .Blood None  Preliminary Report (02-12-20 @ 23:01):    No growth to date.    Culture - Blood (collected 02-07-20 @ 15:25)  Source: .Blood None  Final Report (02-12-20 @ 23:00):    No growth at 5 days.    Culture - Blood (collected 02-07-20 @ 15:25)  Source: .Blood None  Final Report (02-12-20 @ 23:00):    No growth at 5 days.            INFECTIOUS DISEASES TESTING  Hepatitis C Virus Interpretation: Nonreact (12-30-19 @ 04:30)      RADIOLOGY & ADDITIONAL TESTS:        CARDIOLOGY TESTING      MEDICATIONS  chlorhexidine 4% Liquid 1  fat emulsion (Plant Based) 20% Infusion 1.85  heparin  Injectable 5000  levothyroxine 25  meropenem  IVPB 1000  Parenteral Nutrition - Adult 1      ANTIBIOTICS:  meropenem  IVPB 1000 milliGRAM(s) IV Intermittent every 8 hours

## 2020-02-11 NOTE — PROGRESS NOTE ADULT - ASSESSMENT
ASSESSMENT/PLAN  sigmoid diverticulitis with intramural abscess failing outpatient antibiotics  hypothyroidism  spoke with the hospitalist and surgical team  spoke with [facility] admission transfer  TPN green sheet solution will go with pt chart for continued care    PLAN:  tpn order for tonight  check bmp/phos/mg ASSESSMENT/PLAN  sigmoid diverticulitis with intramural abscess failing outpatient antibiotics  hypothyroidism  spoke with the hospitalist and surgical team  spoke with [facility] admission transfer  TPN green sheet solution will go with pt chart for continued care    PLAN:  tpn order for tonight  check bmp/phos/mg   if pt has to be d/c to [facility]   as per Dr Bal taper and d/c tpn before discharge  decrease rate at 40ml for 1hr then 20ml for 1 hr then d/c tpn   start d5 1/2 ns at 30ml for 1 hr then check finger stick 1/2 hr later if f.s is wnl then d/c iv fluids thanks

## 2020-02-11 NOTE — PROGRESS NOTE ADULT - SUBJECTIVE AND OBJECTIVE BOX
KIM WEISS  65y, Female  Allergy: No Known Allergies      CHIEF COMPLAINT: Diverticulitis (10 Feb 2020 23:18)      INTERVAL EVENTS/HPI  - No acute events overnight  - T(F): , Max: 102.9 (02-10-20 @ 15:41)  - Denies any worsening symptoms  - Tolerating medication    ROS  10 system review - neg     SOCIAL HISTORY - not relevant     Substance Use (  ) never used  (  ) IVDU (  ) Other:  Tobacco Usage:  (   ) never smoked   (   ) former smoker   (   ) current smoker   Alcohol Usage: (   ) social  (   ) daily use (   ) denies  Sexual History:       FH noncontributory     VITALS:  T(F): 99, Max: 102.9 (02-10-20 @ 15:41)  HR: 100  BP: 109/57  RR: 16Vital Signs Last 24 Hrs  T(C): 37.2 (2020 06:55), Max: 39.4 (10 Feb 2020 15:41)  T(F): 99 (2020 06:55), Max: 102.9 (10 Feb 2020 15:41)  HR: 100 (2020 05:16) (81 - 120)  BP: 109/57 (2020 05:16) (96/54 - 148/65)  BP(mean): --  RR: 16 (2020 05:16) (16 - 16)  SpO2: --    PHYSICAL EXAM:  Gen: NAD, resting in bed  HEENT: Normocephalic, atraumatic  Neck: supple, no lymphadenopathy  CV: s1 s 2+   Lungs: decreased BS   Abdomen: Soft, BS present. non tender  Ext: Warm, well perfused. L PICC   Neuro: non focal, awake  Skin: no rash, no erythema      TESTS & MEASUREMENTS:                        11.0   20.88 )-----------( 567      ( 10 Feb 2020 09:52 )             32.5     02-10    139  |  100  |  21<H>  ----------------------------<  130<H>  4.1   |  22  |  0.5<L>    Ca    8.8      10 Feb 2020 09:52  Phos  3.2     02-10  Mg     2.1     02-10      eGFR if Non African American: 100 mL/min/1.73M2 (02-10-20 @ 09:52)  eGFR if : 116 mL/min/1.73M2 (02-10-20 @ 09:52)      Urinalysis Basic - ( 2020 10:30 )    Color: Yellow / Appearance: Clear / S.025 / pH: x  Gluc: x / Ketone: Negative  / Bili: Negative / Urobili: 0.2 mg/dL   Blood: x / Protein: 30 mg/dL / Nitrite: Negative   Leuk Esterase: Negative / RBC: x / WBC 1-2 /HPF   Sq Epi: x / Non Sq Epi: Occasional /HPF / Bacteria: Few        Culture - Blood (collected 20 @ 15:25)  Source: .Blood None  Preliminary Report (20 @ 23:01):    No growth to date.    Culture - Blood (collected 20 @ 15:25)  Source: .Blood None  Preliminary Report (20 @ 23:01):    No growth to date.            INFECTIOUS DISEASES TESTING  Hepatitis C Virus Interpretation: Nonreact (19 @ 04:30)      RADIOLOGY & ADDITIONAL TESTS:  I have personally reviewed the last Chest xray  CXR      CT  CT Abdomen and Pelvis w/ IV Cont:   EXAM:  CT ABDOMEN AND PELVIS IC            PROCEDURE DATE:  2020            INTERPRETATION:  CLINICAL STATEMENT: Diverticulitis      TECHNIQUE: Contiguous CT images were obtained of the abdomen and pelvis.  Intravenous Contrast:  Intravenous contrast administered.    Oral contrast: was not administered.        COMPARISON:  CT abdomen pelvis dated 2/3/2020    FINDINGS:    LOWER CHEST: There is mild bibasilar subsegmental atelectasis. No short interval change to a 4 mm right lower lobe pulmonary nodule (4/39).    LIVER: Unremarkable.     SPLEEN: Unremarkable.    PANCREAS: Diffusely scattered pancreatic calcifications compatible with sequelae of chronic pancreatitis. No main duct dilatation or evidence for acute pancreatitis    GALLBLADDER AND BILIARY TREE: Unremarkable CT appearance of the gallbladder. No biliary ductal dilatation.    ADRENALS: Unremarkable.    KIDNEYS: Symmetric pattern of renal enhancement. No hydronephrosis.    LYMPH NODES: There are no enlarged abdominal or pelvic lymph nodes.    VASCULATURE: Normal caliber abdominal aorta with atherosclerotic changes.    BOWEL: There is been interval increase in size of a perirectal abscess situated predominantly posterior to the rectosigmoid junction as well as coursing along the left of the mid rectum. The abscess measures approximately 6.5 x 5.2 x 5.9 cm. Previously this measured up to 4 cm craniocaudad. A portion of the abscess abuts the very posterior of the vaginal wall. Air within the vagina limits the exclusion of underlying fistula. Redemonstration of associated adjacent fat stranding. No bowel obstruction. Unremarkable appendix.    PERITONEUM/RETROPERITONEUM/MESENTERY: There is no ascites or pneumoperitoneum. Abscess as above.    PELVIC VISCERA: Air withinthe vagina is nonspecific. Unremarkable .    BONES AND SOFT TISSUES: No acute osseous abnormality is noted.       IMPRESSION:    Interval increase in size of a perirectal abscess now measuring up to 6.5 cm as detailed above.  A few small locules of air are noted within the vagina for which there is limited evaluation on this exam to exclude underlying colovaginal fistula. If this is clinically suspected, more dedicated imaging can be obtained.                  EMMETT EUGENE M.D., ATTENDING RADIOLOGIST  This document has been electronically signed. Feb 10 2020  7:23AM             (20 @ 17:26)      CARDIOLOGY TESTING      MEDICATIONS  chlorhexidine 4% Liquid 1  heparin  Injectable 5000  levothyroxine 25  meropenem  IVPB 1000  Parenteral Nutrition - Adult 1      ANTIBIOTICS:  meropenem  IVPB 1000 milliGRAM(s) IV Intermittent every 8 hours

## 2020-02-11 NOTE — PROGRESS NOTE ADULT - SUBJECTIVE AND OBJECTIVE BOX
Interventional Radiology Follow- Up Note      65y Female with diverticulitis        O/N:     No complaints offered.    Vitals: T(F): 99 (02-11-20 @ 06:55), Max: 102.9 (02-10-20 @ 15:41)  HR: 100 (02-11-20 @ 05:16) (81 - 120)  BP: 109/57 (02-11-20 @ 05:16) (96/54 - 148/65)  RR: 16 (02-11-20 @ 05:16) (16 - 16)  SpO2: --  Wt(kg): --    LABS:                        11.0   14.07 )-----------( 591      ( 11 Feb 2020 08:00 )             33.1     02-11    142  |  101  |  22<H>  ----------------------------<  117<H>  4.0   |  23  |  0.6<L>    Ca    9.1      11 Feb 2020 08:00  Phos  3.2     02-11  Mg     2.1     02-11      PT/INR - ( 10 Feb 2020 21:35 )   PT: 15.60 sec;   INR: 1.36 ratio         PTT - ( 10 Feb 2020 21:35 )  PTT:33.9 sec    Culture:   output :    PHYSICAL EXAM:    66yo female with recurrent diverticulitis.   Previously CT (2/3/20) showed small collection conatinting air and stool adjacent to inflamed diverticula, which appeared to be an inflamed diverticula. No intervention was perfomed as pt was improving clinically and WBC was downtrending.   Recent CT 2/9/20 reveals increasing size of collection, now measuring up to 6cm.  WBC spiked and now on downtrend again (now 14)  Will plan for aspiration/drainage tomorrow 2/12.   NPO at midnight.   please call with any questions.        Please call Interventional Radiology [phone] with any questions, concerns, or issues regarding above. Interventional Radiology Follow- Up Note      65y Female with diverticulitis        O/N:     No complaints offered.    Vitals: T(F): 99 (02-11-20 @ 06:55), Max: 102.9 (02-10-20 @ 15:41)  HR: 100 (02-11-20 @ 05:16) (81 - 120)  BP: 109/57 (02-11-20 @ 05:16) (96/54 - 148/65)  RR: 16 (02-11-20 @ 05:16) (16 - 16)  SpO2: --  Wt(kg): --    LABS:                        11.0   14.07 )-----------( 591      ( 11 Feb 2020 08:00 )             33.1     02-11    142  |  101  |  22<H>  ----------------------------<  117<H>  4.0   |  23  |  0.6<L>    Ca    9.1      11 Feb 2020 08:00  Phos  3.2     02-11  Mg     2.1     02-11      PT/INR - ( 10 Feb 2020 21:35 )   PT: 15.60 sec;   INR: 1.36 ratio         PTT - ( 10 Feb 2020 21:35 )  PTT:33.9 sec    Culture:   output :    PHYSICAL EXAM:    64yo female with recurrent diverticulitis.   Previously CT (2/3/20) showed small collection conatinting air and stool adjacent to inflamed diverticula, which appeared to be an inflamed diverticula. No intervention was perfomed as pt was improving clinically and WBC was downtrending.   Recent CT 2/9/20 reveals increasing size of collection, now measuring up to 6cm.  WBC spiked and now on downtrend again (now 14)  However this collection still appears intramural and NOT a discrete pericolonic abscess.  No intervention at this time.   please call with any questions.        Please call Interventional Radiology [phone] with any questions, concerns, or issues regarding above. Interventional Radiology Follow- Up Note      65y Female with diverticulitis        O/N:     No complaints offered.    Vitals: T(F): 99 (02-11-20 @ 06:55), Max: 102.9 (02-10-20 @ 15:41)  HR: 100 (02-11-20 @ 05:16) (81 - 120)  BP: 109/57 (02-11-20 @ 05:16) (96/54 - 148/65)  RR: 16 (02-11-20 @ 05:16) (16 - 16)  SpO2: --  Wt(kg): --    LABS:                        11.0   14.07 )-----------( 591      ( 11 Feb 2020 08:00 )             33.1     02-11    142  |  101  |  22<H>  ----------------------------<  117<H>  4.0   |  23  |  0.6<L>    Ca    9.1      11 Feb 2020 08:00  Phos  3.2     02-11  Mg     2.1     02-11      PT/INR - ( 10 Feb 2020 21:35 )   PT: 15.60 sec;   INR: 1.36 ratio         PTT - ( 10 Feb 2020 21:35 )  PTT:33.9 sec    Culture:   output :    PHYSICAL EXAM:    64yo female with recurrent diverticulitis.   Previously CT (2/3/20) showed small collection conatinting air and stool adjacent to inflamed diverticula, which appeared to be an inflamed diverticula. No intervention was perfomed as pt was improving clinically and WBC was downtrending.   Recent CT 2/9/20 reveals increasing size of collection, now measuring up to 6cm.   Will plan for aspiration/drainage tomorrow 2/12.  NPO at midnight.   please call with any questions.        Please call Interventional Radiology [phone] with any questions, concerns, or issues regarding above.

## 2020-02-11 NOTE — DISCHARGE NOTE PROVIDER - NSDCFUSCHEDAPPT_GEN_ALL_CORE_FT
KIM WEISS ; 03/02/2020 ; [facility] [id number] KIM WEISS ; 03/02/2020 ; [facility] [id number] KIM WEISS ; 03/02/2020 ; [facility] [id number]

## 2020-02-11 NOTE — PROGRESS NOTE ADULT - SUBJECTIVE AND OBJECTIVE BOX
Subjective: 66 yo female admitted with diverticular abscess. Pt states she feels much better today, abdominal pain decreased. Denies any fever/chills overnight. Admits to flatus and small mucus-like BMs without pain today.            Vital Signs Last 24 Hrs  T(C): 37.2 (11 Feb 2020 06:55), Max: 39.4 (10 Feb 2020 15:41)  T(F): 99 (11 Feb 2020 06:55), Max: 102.9 (10 Feb 2020 15:41)  HR: 100 (11 Feb 2020 05:16) (81 - 120)  BP: 109/57 (11 Feb 2020 05:16) (96/54 - 148/65)  BP(mean): --  RR: 16 (11 Feb 2020 05:16) (16 - 16)  SpO2: --    General Appearance: Appears well, NAD  Neck: Supple  Chest: Equal expansion bilaterally, equal breath sounds  CV: Pulse regular presently  Abdomen: Soft, +LLQ tenderness, ND,+bs,  no rebound/gaurding  Extremities: Grossly symmetric, no calf tend b/l        I&O's Summary    10 Feb 2020 07:01  -  11 Feb 2020 07:00  --------------------------------------------------------  IN: 520 mL / OUT: 0 mL / NET: 520 mL      I&O's Detail    10 Feb 2020 07:01  -  11 Feb 2020 07:00  --------------------------------------------------------  IN:    PPN (Peripheral Parenteral Nutrition): 520 mL  Total IN: 520 mL    OUT:  Total OUT: 0 mL    Total NET: 520 mL          MEDICATIONS  (STANDING):  chlorhexidine 4% Liquid 1 Application(s) Topical <User Schedule>  heparin  Injectable 5000 Unit(s) SubCutaneous every 12 hours  levothyroxine 25 MICROGram(s) Oral daily  meropenem  IVPB 1000 milliGRAM(s) IV Intermittent every 8 hours  Parenteral Nutrition - Adult 1 Each (65 mL/Hr) TPN Continuous <Continuous>    MEDICATIONS  (PRN):  acetaminophen   Tablet .. 650 milliGRAM(s) Oral every 6 hours PRN Temp greater or equal to 38C (100.4F)  morphine  - Injectable 2 milliGRAM(s) IV Push every 3 hours PRN Severe Pain (7 - 10)      LABS:                        11.0   14.07 )-----------( 591      ( 11 Feb 2020 08:00 )             33.1     02-10    139  |  100  |  21<H>  ----------------------------<  130<H>  4.1   |  22  |  0.5<L>    Ca    8.8      10 Feb 2020 09:52  Phos  3.2     02-10  Mg     2.1     02-10      PT/INR - ( 10 Feb 2020 21:35 )   PT: 15.60 sec;   INR: 1.36 ratio         PTT - ( 10 Feb 2020 21:35 )  PTT:33.9 sec      RADIOLOGY & ADDITIONAL STUDIES: none new    IR re-eval pend

## 2020-02-11 NOTE — PROGRESS NOTE ADULT - ASSESSMENT
64F PMHx hypothyroidism, recurrent diverticulitis (1/2020) here with abd pain, found to have diverticulitis with fluid collection.    #Acute diverticulitis c/b possible abscess  d/w IR, abscess thought to represent diverticulum, did not undergo drainage  repeat ct 2/9 with increasing abscess; ?colovaginal fistula  leukocytosis improved today; febrile overnight  bcx neg 2/7, 2/3; f/u repeat  npo on ppn  cont betty, picc in place  pain control  discussed with son, surgery; plan to transfer to [facility]  #Hypothyroidism  synthroid 25  #DVT ppx  subq hep

## 2020-02-11 NOTE — PROGRESS NOTE ADULT - SUBJECTIVE AND OBJECTIVE BOX
Over the weekend  pt has been febrile, wbc up to over 20,000 and on CT then abscess has gotten bigger to 6.5 cm..  It just doesn't appear  that IV  antibiotics alone is helping - althought pt says she is feeling better.  Discussed with surgical PA.  The plan is to have IR reeview the latest findings - if they say that they can't drain it then pt will likely go to surgery.  Discussed options with the patient.

## 2020-02-11 NOTE — DISCHARGE NOTE PROVIDER - NSDCCPCAREPLAN_GEN_ALL_CORE_FT
PRINCIPAL DISCHARGE DIAGNOSIS  Diagnosis: Diverticulitis of intestine with abscess  Assessment and Plan of Treatment: YOU ARE BEING TRANSFERRED TO [facility]. PRINCIPAL DISCHARGE DIAGNOSIS  Diagnosis: Diverticulitis of intestine with abscess  Assessment and Plan of Treatment: Complete 5 more days of ciprofloxacin 500mg twice daily and metronidazole 500mg three times a day. Follow up with GI doctor in 1 month for further management.

## 2020-02-11 NOTE — PROGRESS NOTE ADULT - PROBLEM SELECTOR PLAN 1
acute   bigger abscess  ? IR vs OR   No change in abx   if OR - then ? 3- 7 days post op abx   if IR - then ? 21 days post IR abx - cx based
acute   recurrent   Per IR - NO drainage - fluid filled diverticulum   NO abscess to drain & No perforation noted    wbc down and NO temps on IV Meropenem   anticipate at least 6 more days of IV Meropenem 1 g Q 8 - Pt Improved on IV Meropenem - NO OTHER CHOICE   recall if needed
acute illness  NO drainage per IR   Remains febrile and incr wbc on abx     Without any cx to guide abx and persistent symptoms on very broad coverage - need to consider more aggressive intervention as the abx have been in effect for over 7 days WITH persistent symptoms    Expand abx to IV Meropenem ( pseudomonal coverage)   If NO response - needs OR intervention !
See above
See abx

## 2020-02-11 NOTE — DISCHARGE NOTE PROVIDER - CARE PROVIDER_API CALL
Natanael Darnell)  Internal Medicine  [address] [place] [address]  Phone: [phone]  Fax: [phone]  Follow Up Time:

## 2020-02-11 NOTE — PROGRESS NOTE ADULT - ASSESSMENT
66 yo female with diverticular abscess.          Plan:  - will discuss repeat CT with IR  - cont current tx - leukocytosis improved greatly today  - will d/w DR Cole, Dr Tam and surgical team

## 2020-02-11 NOTE — PROGRESS NOTE ADULT - PROBLEM SELECTOR PROBLEM 1
Diverticulitis of intestine with abscess
Diverticulitis of large intestine with abscess without bleeding

## 2020-02-11 NOTE — PROGRESS NOTE ADULT - SUBJECTIVE AND OBJECTIVE BOX
INTERVAL HPI/OVERNIGHT EVENTS:    SUBJECTIVE: Patient seen and examined at bedside.     no cp, sob, +fever  abd pain, llq, crampy, dull, worsen to touch, nonradiating    OBJECTIVE:    VITAL SIGNS:  Vital Signs Last 24 Hrs  T(C): 37.2 (11 Feb 2020 06:55), Max: 39.4 (10 Feb 2020 15:41)  T(F): 99 (11 Feb 2020 06:55), Max: 102.9 (10 Feb 2020 15:41)  HR: 100 (11 Feb 2020 05:16) (81 - 120)  BP: 109/57 (11 Feb 2020 05:16) (96/54 - 148/65)  BP(mean): --  RR: 16 (11 Feb 2020 05:16) (16 - 16)  SpO2: --      PHYSICAL EXAM:    General: NAD  HEENT: NC/AT; PERRL, clear conjunctiva  Neck: supple  Respiratory: CTA b/l  Cardiovascular: +S1/S2; RRR  Abdomen: soft, ttp llq/ND; +BS x4  Extremities: WWP, 2+ peripheral pulses b/l; no LE edema  Skin: normal color and turgor; no rash  Neurological:    MEDICATIONS:  MEDICATIONS  (STANDING):  chlorhexidine 4% Liquid 1 Application(s) Topical <User Schedule>  heparin  Injectable 5000 Unit(s) SubCutaneous every 12 hours  levothyroxine 25 MICROGram(s) Oral daily  meropenem  IVPB 1000 milliGRAM(s) IV Intermittent every 8 hours  Parenteral Nutrition - Adult 1 Each (65 mL/Hr) TPN Continuous <Continuous>    MEDICATIONS  (PRN):  acetaminophen   Tablet .. 650 milliGRAM(s) Oral every 6 hours PRN Temp greater or equal to 38C (100.4F)  morphine  - Injectable 2 milliGRAM(s) IV Push every 3 hours PRN Severe Pain (7 - 10)      ALLERGIES:  Allergies    No Known Allergies    Intolerances        LABS:                        11.0   14.07 )-----------( 591      ( 11 Feb 2020 08:00 )             33.1     Hemoglobin: 11.0 g/dL (02-11 @ 08:00)  Hemoglobin: 11.0 g/dL (02-10 @ 09:52)  Hemoglobin: 11.6 g/dL (02-09 @ 15:11)  Hemoglobin: 12.2 g/dL (02-08 @ 04:30)  Hemoglobin: 12.8 g/dL (02-07 @ 07:00)    CBC Full  -  ( 11 Feb 2020 08:00 )  WBC Count : 14.07 K/uL  RBC Count : 3.44 M/uL  Hemoglobin : 11.0 g/dL  Hematocrit : 33.1 %  Platelet Count - Automated : 591 K/uL  Mean Cell Volume : 96.2 fL  Mean Cell Hemoglobin : 32.0 pg  Mean Cell Hemoglobin Concentration : 33.2 g/dL  Auto Neutrophil # : x  Auto Lymphocyte # : x  Auto Monocyte # : x  Auto Eosinophil # : x  Auto Basophil # : x  Auto Neutrophil % : x  Auto Lymphocyte % : x  Auto Monocyte % : x  Auto Eosinophil % : x  Auto Basophil % : x    02-11    142  |  101  |  22<H>  ----------------------------<  117<H>  4.0   |  23  |  0.6<L>    Ca    9.1      11 Feb 2020 08:00  Phos  3.2     02-11  Mg     2.1     02-11      Creatinine Trend: 0.6<--, 0.5<--, 0.6<--, 0.7<--, 0.8<--, 0.8<--    PT/INR - ( 10 Feb 2020 21:35 )   PT: 15.60 sec;   INR: 1.36 ratio         PTT - ( 10 Feb 2020 21:35 )  PTT:33.9 sec    hs Troponin:              CSF:                      EKG:   MICROBIOLOGY:    IMAGING:      Labs, imaging, EKG personally reviewed    RADIOLOGY & ADDITIONAL TESTS: Reviewed.

## 2020-02-11 NOTE — PROGRESS NOTE ADULT - SUBJECTIVE AND OBJECTIVE BOX
Patient is a 65y old  Female who presents with a chief complaint of Diverticulitis (11 Feb 2020 11:20)      Last 24 hrs:         ICU Vital Signs Last 24 Hrs  T(C): 37.2 (11 Feb 2020 06:55), Max: 39.4 (10 Feb 2020 15:41)  T(F): 99 (11 Feb 2020 06:55), Max: 102.9 (10 Feb 2020 15:41)  HR: 100 (11 Feb 2020 05:16) (81 - 120)  BP: 109/57 (11 Feb 2020 05:16) (96/54 - 148/65)  BP(mean): --  ABP: --  ABP(mean): --  RR: 16 (11 Feb 2020 05:16) (16 - 16)  SpO2: --      Drug Dosing Weight  Height (cm): 165.1 (05 Feb 2020 08:01)  Weight (kg): 54.2 (05 Feb 2020 08:01)  BMI (kg/m2): 19.9 (05 Feb 2020 08:01)  BSA (m2): 1.59 (05 Feb 2020 08:01)    I&O's Detail    10 Feb 2020 07:01  -  11 Feb 2020 07:00  --------------------------------------------------------  IN:    PPN (Peripheral Parenteral Nutrition): 520 mL  Total IN: 520 mL    OUT:  Total OUT: 0 mL    Total NET: 520 mL           PHYSICAL EXAM:    Constitutional:    ENMT:    Neck:    Breasts:    Respiratory:    Gastrointestinal:    Genitourinary:    Rectal:    Extremities    Neurological:    Skin:    Lymph Nodes:    IV access:    MEDICATIONS  (STANDING):  chlorhexidine 4% Liquid 1 Application(s) Topical <User Schedule>  heparin  Injectable 5000 Unit(s) SubCutaneous every 12 hours  levothyroxine 25 MICROGram(s) Oral daily  meropenem  IVPB 1000 milliGRAM(s) IV Intermittent every 8 hours  Parenteral Nutrition - Adult 1 Each (65 mL/Hr) TPN Continuous <Continuous>      Diet, NPO:   Except Medications  With Ice Chips/Sips of Water (02-07-20 @ 17:14)      LABS  02-11    142  |  101  |  22<H>  ----------------------------<  117<H>  4.0   |  23  |  0.6<L>    Ca    9.1      11 Feb 2020 08:00  Phos  3.2     02-11  Mg     2.1     02-11                            11.0   14.07 )-----------( 591      ( 11 Feb 2020 08:00 )             33.1     CAPILLARY BLOOD GLUCOSE      POCT Blood Glucose.: 105 mg/dL (10 Feb 2020 21:00)  POCT Blood Glucose.: 129 mg/dL (10 Feb 2020 17:08)       RADIOLOGY STUDIES Patient is a 65y old  Female who presents with a chief complaint of Diverticulitis (11 Feb 2020 11:20)  pt seen and evaluated   Npo on TPN  Per pt she will be transferred to [facility] today       ICU Vital Signs Last 24 Hrs  T(C): 37.2 (11 Feb 2020 06:55), Max: 39.4 (10 Feb 2020 15:41)  T(F): 99 (11 Feb 2020 06:55), Max: 102.9 (10 Feb 2020 15:41)  HR: 100 (11 Feb 2020 05:16) (81 - 120)  BP: 109/57 (11 Feb 2020 05:16) (96/54 - 148/65)  RR: 16 (11 Feb 2020 05:16) (16 - 16)  Drug Dosing Weight  Height (cm): 165.1 (05 Feb 2020 08:01)  Weight (kg): 54.2 (05 Feb 2020 08:01)  BMI (kg/m2): 19.9 (05 Feb 2020 08:01)  BSA (m2): 1.59 (05 Feb 2020 08:01)    I&O's Detail    10 Feb 2020 07:01  -  11 Feb 2020 07:00  --------------------------------------------------------  IN:    PPN (Peripheral Parenteral Nutrition): 520 mL  Total IN: 520 mL    OUT:  Total OUT: 0 mL    Total NET: 520 mL     PHYSICAL EXAM:  Constitutional: alert and oriented   gastrointestinal:  soft n/d  IV access: picc line    MEDICATIONS  (STANDING):  chlorhexidine 4% Liquid 1 Application(s) Topical <User Schedule>  heparin  Injectable 5000 Unit(s) SubCutaneous every 12 hours  levothyroxine 25 MICROGram(s) Oral daily  meropenem  IVPB 1000 milliGRAM(s) IV Intermittent every 8 hours  Parenteral Nutrition - Adult 1 Each (65 mL/Hr) TPN Continuous <Continuous>      Diet, NPO:   Except Medications  With Ice Chips/Sips of Water (02-07-20 @ 17:14)      LABS  02-11    142  |  101  |  22<H>  ----------------------------<  117<H>  4.0   |  23  |  0.6<L>    Ca    9.1      11 Feb 2020 08:00  Phos  3.2     02-11  Mg     2.1     02-11                        11.0   14.07 )-----------( 591      ( 11 Feb 2020 08:00 )             33.1     CAPILLARY BLOOD GLUCOSE  POCT Blood Glucose.: 105 mg/dL (10 Feb 2020 21:00)  POCT Blood Glucose.: 129 mg/dL (10 Feb 2020 17:08)   RADIOLOGY STUDIES  < from: CT Abdomen and Pelvis w/ IV Cont (02.09.20 @ 17:26) >  IMPRESSION:  Interval increase in size of a perirectal abscess now measuring up to 6.5 cm as detailed above.  A few small locules of air are noted within the vagina for which there is limited evaluation on this exam to exclude underlying colovaginal fistula. If this is clinically suspected, more dedicated imaging can be obtained.

## 2020-02-11 NOTE — DISCHARGE NOTE PROVIDER - NSDCMRMEDTOKEN_GEN_ALL_CORE_FT
levothyroxine 25 mcg (0.025 mg) oral tablet: 1 tab(s) orally once a day  Lypholyte II/Nutrilyte II/TPN Electrolytes intravenous solution: 1 each intravenous   meropenem 1000 mg intravenous injection: 1000 milligram(s) intravenous every 8 hours levothyroxine 25 mcg (0.025 mg) oral tablet: 1 tab(s) orally once a day

## 2020-02-11 NOTE — DISCHARGE NOTE PROVIDER - HOSPITAL COURSE
64F PMHx hypothyroidism, recurrent diverticulitis (1/2020) here with abd pain, found to have diverticulitis with fluid collection. Diagnosed with diverticulitis on ct in nov 2019, started on cipro, flagyl outpt. Abd pain persisted was seen by GI in Dec, started on augmentin. She was admitted in jan 2020 for persistent pain, ct redomonstrating diverticulitis. Seen by id at that time, picc placed, started on ertapneem for 14 days. She was discharged, Abd pain returned, pt was admitted again on 1/29/2020. CT demonstrated concern for intramural abscess, she was continued on carbapenem, seen by id, gi, surgery, ir. CT redemonstrated concern for abscess 3cm; discussed with ir; there was concern abscess that was identified may be inflammed diverticulum, no drainage was undergone. She continued to have fevers with elevation of wbc. Discussed with son, plan to transfer to [facility]. Stable for transfer on 2/11.        accepting physician: Dr. Honey Longoria; surgical service. 64F PMHx hypothyroidism, recurrent diverticulitis (1/2020) here with abd pain, found to have diverticulitis with fluid collection. Diagnosed with diverticulitis on ct in nov 2019, started on cipro, flagyl outpt. Abd pain persisted was seen by GI in Dec, started on augmentin. She was admitted in jan 2020 for persistent pain, ct redomonstrating diverticulitis. Seen by id at that time, picc placed, started on ertapneem for 14 days. She was discharged, Abd pain returned, pt was admitted again on 1/29/2020. CT demonstrated concern for intramural abscess, she was continued on carbapenem, seen by id, gi, surgery, ir. CT redemonstrated concern for abscess 3cm; discussed with ir; there was concern abscess that was identified may be inflammed diverticulum, no drainage was undergone. She was placed npo, ppn started by nutrition. She continued to have fevers with elevation of wbc. Discussed with son, plan to transfer to [facility]. Stable for transfer on 2/11.        accepting physician: Dr. Honey Longoria; surgical service. 64F PMHx hypothyroidism, recurrent diverticulitis (1/2020) here with abd pain, found to have diverticulitis with fluid collection. Diagnosed with diverticulitis on ct in nov 2019, started on cipro, flagyl outpt. Abd pain persisted was seen by GI in Dec, started on augmentin. She was admitted in jan 2020 for persistent pain, ct redomonstrating diverticulitis. Seen by id at that time, picc placed, started on ertapneem for 14 days. She was discharged, Abd pain returned, pt was admitted again on 1/29/2020. CT demonstrated concern for intramural abscess, she was continued on carbapenem, seen by id, gi, surgery, ir. CT redemonstrated concern for abscess 3cm; discussed with ir; there was concern abscess that was identified may be inflammed diverticulum, no drainage was undergone. She was placed npo, ppn started by nutrition. She continued to have fevers with elevation of wbc. She was taken to IR for aspiration on 02/13, but was found to have resolution of diverticular abscess. Patient's diet was advanced and is now medically stable for discharge.

## 2020-02-11 NOTE — CHART NOTE - NSCHARTNOTEFT_GEN_A_CORE
Patient seen at bedside, resting comfortably.    All questions and concerns addressed during visit.     Patient encouraged to contact PA with any further issues.     WBC downtrending.  IR reviewed films, possible Aspiration/Drainage in AM.  Patient's family was transfer to [facility].  Information sent to bed management.  c/w IV ABX.  ID following.

## 2020-02-12 LAB
ANION GAP SERPL CALC-SCNC: 14 MMOL/L — SIGNIFICANT CHANGE UP (ref 7–14)
BUN SERPL-MCNC: 19 MG/DL — SIGNIFICANT CHANGE UP (ref 10–20)
CALCIUM SERPL-MCNC: 8.7 MG/DL — SIGNIFICANT CHANGE UP (ref 8.5–10.1)
CHLORIDE SERPL-SCNC: 103 MMOL/L — SIGNIFICANT CHANGE UP (ref 98–110)
CO2 SERPL-SCNC: 22 MMOL/L — SIGNIFICANT CHANGE UP (ref 17–32)
CREAT SERPL-MCNC: 0.6 MG/DL — LOW (ref 0.7–1.5)
CULTURE RESULTS: SIGNIFICANT CHANGE UP
CULTURE RESULTS: SIGNIFICANT CHANGE UP
GLUCOSE SERPL-MCNC: 119 MG/DL — HIGH (ref 70–99)
HCT VFR BLD CALC: 34.5 % — LOW (ref 37–47)
HGB BLD-MCNC: 11.8 G/DL — LOW (ref 12–16)
MAGNESIUM SERPL-MCNC: 2.1 MG/DL — SIGNIFICANT CHANGE UP (ref 1.8–2.4)
MCHC RBC-ENTMCNC: 33.2 PG — HIGH (ref 27–31)
MCHC RBC-ENTMCNC: 34.2 G/DL — SIGNIFICANT CHANGE UP (ref 32–37)
MCV RBC AUTO: 97.2 FL — SIGNIFICANT CHANGE UP (ref 81–99)
NRBC # BLD: 0 /100 WBCS — SIGNIFICANT CHANGE UP (ref 0–0)
PHOSPHATE SERPL-MCNC: 3.2 MG/DL — SIGNIFICANT CHANGE UP (ref 2.1–4.9)
PLATELET # BLD AUTO: 310 K/UL — SIGNIFICANT CHANGE UP (ref 130–400)
POTASSIUM SERPL-MCNC: 4 MMOL/L — SIGNIFICANT CHANGE UP (ref 3.5–5)
POTASSIUM SERPL-SCNC: 4 MMOL/L — SIGNIFICANT CHANGE UP (ref 3.5–5)
RBC # BLD: 3.55 M/UL — LOW (ref 4.2–5.4)
RBC # FLD: 12.2 % — SIGNIFICANT CHANGE UP (ref 11.5–14.5)
SODIUM SERPL-SCNC: 139 MMOL/L — SIGNIFICANT CHANGE UP (ref 135–146)
SPECIMEN SOURCE: SIGNIFICANT CHANGE UP
SPECIMEN SOURCE: SIGNIFICANT CHANGE UP
WBC # BLD: 9.12 K/UL — SIGNIFICANT CHANGE UP (ref 4.8–10.8)
WBC # FLD AUTO: 9.12 K/UL — SIGNIFICANT CHANGE UP (ref 4.8–10.8)

## 2020-02-12 PROCEDURE — 99233 SBSQ HOSP IP/OBS HIGH 50: CPT

## 2020-02-12 RX ORDER — ELECTROLYTE SOLUTION,INJ
1 VIAL (ML) INTRAVENOUS
Refills: 0 | Status: DISCONTINUED | OUTPATIENT
Start: 2020-02-12 | End: 2020-02-12

## 2020-02-12 RX ADMIN — MEROPENEM 100 MILLIGRAM(S): 1 INJECTION INTRAVENOUS at 21:30

## 2020-02-12 RX ADMIN — Medication 1 EACH: at 20:28

## 2020-02-12 RX ADMIN — Medication 25 MICROGRAM(S): at 05:10

## 2020-02-12 RX ADMIN — Medication 30 MILLILITER(S): at 05:56

## 2020-02-12 RX ADMIN — MEROPENEM 100 MILLIGRAM(S): 1 INJECTION INTRAVENOUS at 14:12

## 2020-02-12 RX ADMIN — MEROPENEM 100 MILLIGRAM(S): 1 INJECTION INTRAVENOUS at 05:09

## 2020-02-12 NOTE — PROGRESS NOTE ADULT - SUBJECTIVE AND OBJECTIVE BOX
HPI:  pt feels better, denies abd pain n/v.      PAST MEDICAL & SURGICAL HISTORY:  Diverticulitis  Hypothyroidism  H/O tubal ligation      Allergies    No Known Allergies          MEDICATIONS  (STANDING):  chlorhexidine 4% Liquid 1 Application(s) Topical <User Schedule>  fat emulsion (Plant Based) 20% Infusion 1.85 Gm/kG/Day (31.334 mL/Hr) IV Continuous <Continuous>  heparin  Injectable 5000 Unit(s) SubCutaneous every 12 hours  levothyroxine 25 MICROGram(s) Oral daily  meropenem  IVPB 1000 milliGRAM(s) IV Intermittent every 8 hours  Parenteral Nutrition - Adult 1 Each (65 mL/Hr) TPN Continuous <Continuous>    MEDICATIONS  (PRN):  acetaminophen   Tablet .. 650 milliGRAM(s) Oral every 6 hours PRN Temp greater or equal to 38C (100.4F)  aluminum hydroxide/magnesium hydroxide/simethicone Suspension 30 milliLiter(s) Oral every 6 hours PRN Dyspepsia  morphine  - Injectable 2 milliGRAM(s) IV Push every 3 hours PRN Severe Pain (7 - 10)      ROS:  General:	no fever, weight loss,  chills  Respiratory and Thorax: no cough, wheeze,  sob  Cardiovascular:	no chest pain, palpitations, dizziness  Gastrointestinal:	no nausea, vomiting, diarrhea, abd pain  Genitourinary:	no dysuria, hematuria        Vital Signs Last 24 Hrs  T(F): 98.8 (12 Feb 2020 05:22), Max: 100.3 (11 Feb 2020 14:17)  HR: 86 (12 Feb 2020 05:22) (86 - 97)  BP: 116/72 (12 Feb 2020 05:22) (113/58 - 121/60)  RR: 16 (12 Feb 2020 05:22) (16 - 16)      PHYSICAL EXAM:      Constitutional: A&Ox4  Respiratory: cta b/l  Cardiovascular: s1 s2 rrr  Gastrointestinal: soft nt  nd + bs no rebound or guarding  Genitourinary: no cva tenderness  Extremities: normal rom, no edema, calf tenderness                            11.8   9.12  )-----------( 310      ( 12 Feb 2020 07:01 )             34.5       02-12    139  |  103  |  19  ----------------------------<  119<H>  4.0   |  22  |  0.6<L>    Ca    8.7      12 Feb 2020 07:01  Phos  3.2     02-12  Mg     2.1     02-12

## 2020-02-12 NOTE — PROGRESS NOTE ADULT - SUBJECTIVE AND OBJECTIVE BOX
HPI:  65 yo F with PMHx of Hypothyroidism, Recurrent Diverticulitis, recently admitted at [facility] and discharged on 01/03/2020 for Diverticulitis failed on outpatient antibiotics, presented with complaint of dull, at times radiating to back left lower quadrant abdominal pain associated with decreased appetite since Sunday, pain currently 8/10, although patient appears comfortable. Patient was seen by GI yesterday, reported to have constipation. Patient denies fever, chills, nausea, vomiting, shortness of breath, last bowel movement this morning, positive flatus. (29 Jan 2020 19:37)    Now on TPN and NPO.  Will need IR drainage, wants to go to [facility]    PAST MEDICAL & SURGICAL HISTORY:  Diverticulitis  Hypothyroidism  H/O tubal ligation      REVIEW OF SYSTEMS:    CONSTITUTIONAL: No weakness, fevers or chills  EYES/ENT: No visual changes;  No vertigo or throat pain   NECK: No pain or stiffness  RESPIRATORY: No cough, wheezing, hemoptysis; No shortness of breath  CARDIOVASCULAR: No chest pain or palpitations  GASTROINTESTINAL: Abdominal pain and rectal pain, No nausea, vomiting, or hematemesis; No diarrhea or constipation. No melena or hematochezia.  GENITOURINARY: No dysuria, frequency or hematuria  NEUROLOGICAL: No numbness or weakness  SKIN: No itching, rashes      MEDICATIONS  (STANDING):  acetaminophen   Tablet .. 650 milliGRAM(s) Oral every 6 hours  chlorhexidine 4% Liquid 1 Application(s) Topical <User Schedule>  ertapenem  IVPB 1000 milliGRAM(s) IV Intermittent every 24 hours  fat emulsion (Plant Based) 20% Infusion 1.7 Gm/kG/Day (31.397 mL/Hr) IV Continuous <Continuous>  lactated ringers. 1000 milliLiter(s) (75 mL/Hr) IV Continuous <Continuous>  levothyroxine 25 MICROGram(s) Oral daily  Parenteral Nutrition - Adult 1 Each (70 mL/Hr) TPN Continuous <Continuous>    MEDICATIONS  (PRN):  morphine  - Injectable 1 milliGRAM(s) IV Push every 6 hours PRN Moderate Pain (4 - 6)      Allergies    No Known Allergies    Intolerances        SOCIAL HISTORY:    FAMILY HISTORY:  FH: myocardial infarction: mother  FH: diabetes mellitus: mother    Vital Signs Last 24 Hrs  T(C): 36.8 (12 Feb 2020 14:11), Max: 37.3 (11 Feb 2020 21:30)  T(F): 98.3 (12 Feb 2020 14:11), Max: 99.1 (11 Feb 2020 21:30)  HR: 84 (12 Feb 2020 14:11) (84 - 86)  BP: 115/57 (12 Feb 2020 14:11) (113/58 - 116/72)  BP(mean): --  RR: 16 (12 Feb 2020 14:11) (16 - 16)  SpO2: --    PHYSICAL EXAM:  GENERAL: NAD, well-developed, well nourished, looks stated age  HEAD:  Atraumatic, Normocephalic  EYES: EOMI, PERRLA, conjunctiva and sclera clear  NECK: Supple, No JVD, no bruits, no masses, no thyroid enlargement  ENMT: No tonsillar erythema, exudated or enlargement, moist mucous membranes  CHEST/LUNG: Clear to auscultation bilaterally; No rales, rhonchi or wheezing, no dullness to percussion  HEART: S1,S2 Regular rate and rhythm; No murmurs, rubs, or gallops  ABDOMEN: Soft, tender lower abdomen, nondistended, no rebound tenderness; No palpable masses, no hernias, no organomegaly; Bowel sounds present and normoactive  EXTREMITIES:  2+ peripheral pulses bilaterally and symmetrically, no clubbing, cyanosis, or edema  LYMPH: No lymphadenopathy  PSYCH: AAOx3, normal mood and affect  NEUROLOGY: non-focal, muscle strength 5/5 all extremities, DTRs 2+ symmetrically  SKIN: No rashes or lesions                              11.8   9.12  )-----------( 310      ( 12 Feb 2020 07:01 )             34.5     02-12    139  |  103  |  19  ----------------------------<  119<H>  4.0   |  22  |  0.6<L>    Ca    8.7      12 Feb 2020 07:01  Phos  3.2     02-12  Mg     2.1     02-12        PT/INR - ( 10 Feb 2020 21:35 )   PT: 15.60 sec;   INR: 1.36 ratio         PTT - ( 10 Feb 2020 21:35 )  PTT:33.9 sec                    RADIOLOGY & ADDITIONAL STUDIES:    CT diverticulitis with abscess

## 2020-02-12 NOTE — PROGRESS NOTE ADULT - ASSESSMENT
Abdominal pain no improvement  Diverticulitis with abscess(larger)  Decreased WBC count    Plan   NPO except meds  Continue IV antibiotics  PPN today  Monitor CBC  IR drainage if no transfer to [facility]

## 2020-02-12 NOTE — PROGRESS NOTE ADULT - ASSESSMENT
Patient is a 65y old  Female who presents with a chief complaint of Diverticulitis.    # Diverticulitis of large intestine with abscess  - NO drainage per IR ,Remains febrile and incr wbc on abx and Also Repeat CT scan as of 2/9/20 shows Interval increase in size of a perirectal abscess  # Sepsis ( Persistent Fever + Leukocytosis)      would recommend:    1. Continue Meropenem for now  2. Monitor Temp. and c/w supportive care  3. Advanced diet as tolerated        - spent more than 35 minutes on total encounter; Patient is a 65y old  Female who presents with a chief complaint of Diverticulitis.    # Diverticulitis of large intestine with abscess  - NO drainage per IR ,Remains febrile and incr wbc on abx and Also Repeat CT scan as of 2/9/20 shows Interval increase in size of a perirectal abscess  # Sepsis ( Persistent Fever + Leukocytosis)      would recommend:    1. Keep  NPO for R guided drainage of the Abscess tomorrow  2. Continue Meropenem until drainage of the abscess and culture resulted   3. Monitor Temp. and c/w supportive care  4. Continue  TPN for now        - spent more than 35 minutes on total encounter;

## 2020-02-12 NOTE — PROGRESS NOTE ADULT - SUBJECTIVE AND OBJECTIVE BOX
Interventional Radiology Follow- Up Note    Vitals: T(F): 98.8 (02-12-20 @ 05:22), Max: 100.3 (02-11-20 @ 14:17)  HR: 86 (02-12-20 @ 05:22) (86 - 97)  BP: 116/72 (02-12-20 @ 05:22) (113/58 - 121/60)  RR: 16 (02-12-20 @ 05:22) (16 - 16)  SpO2: --  Wt(kg): --    LABS:                        11.8   9.12  )-----------( x        ( 12 Feb 2020 07:01 )             34.5     02-11    142  |  101  |  22<H>  ----------------------------<  117<H>  4.0   |  23  |  0.6<L>    Ca    9.1      11 Feb 2020 08:00  Phos  3.2     02-11  Mg     2.1     02-11      PT/INR - ( 10 Feb 2020 21:35 )   PT: 15.60 sec;   INR: 1.36 ratio         PTT - ( 10 Feb 2020 21:35 )  PTT:33.9 sec      Impression:   64yo female with recurrent diverticulitis.   Previously CT (2/3/20) showed small collection containing air and stool adjacent to inflamed diverticula, which appeared to be an inflamed diverticula.   Recent CT 2/9/20 reveals increasing size of collection, now measuring up to 6cm.   Was planning for image guided aspiration/drainage today.   However, patient's family is now refusing procedure.  Discussed with Dr Cole.  please call with any questions.        Please call Interventional Radiology [phone] with any questions, concerns, or issues regarding above.

## 2020-02-12 NOTE — PROGRESS NOTE ADULT - ASSESSMENT
64F PMHx hypothyroidism, recurrent diverticulitis (1/2020) here with abd pain, found to have diverticulitis with fluid collection.    1. Acute diverticulitis, c/b abscess: Repeat CT 02/09 showing increase in size. Was planned for IR drainage today, but pt would like to be transferred to [facility]. Continue meropenem via PICC. NPO on ppn  2. Hypothyroidism: Cont synthroid    DVT/GI PPX      #Progress Note Handoff  Pending (specify): Transfer to [facility]  Family discussion: d/w pt regarding transfer  Disposition: [facility] transfer

## 2020-02-12 NOTE — PROGRESS NOTE ADULT - ASSESSMENT
Pt is a 64yo with perforated diverticulitis with abscess. Pt is awaiting transfer to [facility] today as per there request. They refused IR intervention here.     -c/w NPO, PPN, iv abx  -surgical plan as per [facility] team

## 2020-02-12 NOTE — PROGRESS NOTE ADULT - SUBJECTIVE AND OBJECTIVE BOX
KIM WEISS  65y, Female  Allergy: No Known Allergies    Hospital Day: 14d    Patient seen and examined earlier today. No acute events overnight.    PMH/PSH:  PAST MEDICAL & SURGICAL HISTORY:  Diverticulitis  Hypothyroidism  H/O tubal ligation    VITALS:  T(F): 98.8 (02-12-20 @ 05:22), Max: 100.3 (02-11-20 @ 14:17)  HR: 86 (02-12-20 @ 05:22)  BP: 116/72 (02-12-20 @ 05:22) (113/58 - 121/60)  RR: 16 (02-12-20 @ 05:22)  SpO2: --    TESTS & MEASUREMENTS:  Weight (Kg):       02-10-20 @ 07:01  -  02-11-20 @ 07:00  --------------------------------------------------------  IN: 520 mL / OUT: 0 mL / NET: 520 mL    02-11-20 @ 07:01  -  02-12-20 @ 07:00  --------------------------------------------------------  IN: 385.2 mL / OUT: 0 mL / NET: 385.2 mL                        11.8   9.12  )-----------( 310      ( 12 Feb 2020 07:01 )             34.5     PT/INR - ( 10 Feb 2020 21:35 )   PT: 15.60 sec;   INR: 1.36 ratio         PTT - ( 10 Feb 2020 21:35 )  PTT:33.9 sec  02-12    139  |  103  |  19  ----------------------------<  119<H>  4.0   |  22  |  0.6<L>    Ca    8.7      12 Feb 2020 07:01  Phos  3.2     02-12  Mg     2.1     02-12    Culture - Blood (collected 02-07-20 @ 15:25)  Source: .Blood None  Preliminary Report (02-08-20 @ 23:01):    No growth to date.    Culture - Blood (collected 02-07-20 @ 15:25)  Source: .Blood None  Preliminary Report (02-08-20 @ 23:01):    No growth to date.    MEDICATIONS:  MEDICATIONS  (STANDING):  chlorhexidine 4% Liquid 1 Application(s) Topical <User Schedule>  fat emulsion (Plant Based) 20% Infusion 1.85 Gm/kG/Day (31.334 mL/Hr) IV Continuous <Continuous>  heparin  Injectable 5000 Unit(s) SubCutaneous every 12 hours  levothyroxine 25 MICROGram(s) Oral daily  meropenem  IVPB 1000 milliGRAM(s) IV Intermittent every 8 hours  Parenteral Nutrition - Adult 1 Each (65 mL/Hr) TPN Continuous <Continuous>    MEDICATIONS  (PRN):  acetaminophen   Tablet .. 650 milliGRAM(s) Oral every 6 hours PRN Temp greater or equal to 38C (100.4F)  aluminum hydroxide/magnesium hydroxide/simethicone Suspension 30 milliLiter(s) Oral every 6 hours PRN Dyspepsia  morphine  - Injectable 2 milliGRAM(s) IV Push every 3 hours PRN Severe Pain (7 - 10)      HOME MEDICATIONS:  levothyroxine 25 mcg (0.025 mg) oral tablet (02-11)  Lypholyte II/Nutrilyte II/TPN Electrolytes intravenous solution (02-11)  meropenem 1000 mg intravenous injection (02-11)      REVIEW OF SYSTEMS:  All other review of systems is negative unless indicated above.     PHYSICAL EXAM:  GENERAL: NAD  HEENT: No Swelling  CHEST/LUNG: Good air entry, No wheezing  HEART: RRR, No murmurs  ABDOMEN: Soft, Bowel sounds present  EXTREMITIES:  No clubbing

## 2020-02-12 NOTE — PROGRESS NOTE ADULT - SUBJECTIVE AND OBJECTIVE BOX
Patient is seen and examined at the bed side, is afebrile today.  She is s/p ?? IR guided drainage today. The Leukocytosis trended down to normal.        REVIEW OF SYSTEMS: All other review systems are negative        Vital Signs Last 24 Hrs  T(C): 36.8 (2020 14:11), Max: 37.1 (2020 05:22)  T(F): 98.3 (2020 14:11), Max: 98.8 (2020 05:22)  HR: 84 (2020 14:11) (84 - 86)  BP: 115/57 (2020 14:11) (115/57 - 116/72)  BP(mean): --  RR: 16 (2020 14:11) (16 - 16)  SpO2: --      PHYSICAL EXAM:  GENERAL: Not in distress  CHEST/LUNG:  Air entry bilaterally  HEART: s1 and s2 present   ABDOMEN:  Nontender and Nondistended  EXTREMITIES:  No pedal  edema  CNS: Awake AND Alert        MEDICATIONS  (STANDING):  chlorhexidine 4% Liquid 1 Application(s) Topical <User Schedule>  heparin  Injectable 5000 Unit(s) SubCutaneous every 12 hours  levothyroxine 25 MICROGram(s) Oral daily  meropenem  IVPB 1000 milliGRAM(s) IV Intermittent every 8 hours  Parenteral Nutrition - Adult 1 Each (65 mL/Hr) TPN Continuous <Continuous>  Parenteral Nutrition - Adult 1 Each (65 mL/Hr) TPN Continuous <Continuous>    MEDICATIONS  (PRN):  acetaminophen   Tablet .. 650 milliGRAM(s) Oral every 6 hours PRN Temp greater or equal to 38C (100.4F)  aluminum hydroxide/magnesium hydroxide/simethicone Suspension 30 milliLiter(s) Oral every 6 hours PRN Dyspepsia  morphine  - Injectable 2 milliGRAM(s) IV Push every 3 hours PRN Severe Pain (7 - 10)          Allergies    No Known Allergies          LABS:                        11.8   9.12  )-----------( 310      ( 2020 07:01 )             34.5                           11.0   20.88 )-----------( 567      ( 10 Feb 2020 09:52 )             32.5         02-12    139  |  103  |  19  ----------------------------<  119<H>  4.0   |  22  |  0.6<L>    Ca    8.7      2020 07:01  Phos  3.2     02-12  Mg     2.1     02-12      02-10    139  |  100  |  21<H>  ----------------------------<  130<H>  4.1   |  22  |  0.5<L>    Ca    8.8      10 Feb 2020 09:52  Phos  3.2     -10  Mg     2.1     02-10    TPro  6.4  /  Alb  3.5  /  TBili  <0.2  /  DBili  x   /  AST  27  /  ALT  15  /  AlkPhos  118<H>      PT/INR - ( 10 Feb 2020 21:35 )   PT: 15.60 sec;   INR: 1.36 ratio    PTT - ( 10 Feb 2020 21:35 )  PTT:33.9 sec      Urinalysis Basic - ( 2020 10:30 )  Color: Yellow / Appearance: Clear / S.025 / pH: x  Gluc: x / Ketone: Negative  / Bili: Negative / Urobili: 0.2 mg/dL   Blood: x / Protein: 30 mg/dL / Nitrite: Negative   Leuk Esterase: Negative / RBC: x / WBC 1-2 /HPF   Sq Epi: x / Non Sq Epi: Occasional /HPF / Bacteria: Few      CAPILLARY BLOOD GLUCOSE  POCT Blood Glucose.: 105 mg/dL (10 Feb 2020 21:00)  POCT Blood Glucose.: 129 mg/dL (10 Feb 2020 17:08)        RADIOLOGY & ADDITIONAL TESTS:    < from: CT Abdomen and Pelvis w/ IV Cont (20 @ 17:26) >  Interval increase in size of a perirectal abscess now measuring up to 6.5 cm as detailed above.  A few small locules of air are noted within the vagina for which there is limited evaluation on this exam to exclude underlying colovaginal fistula. If this is clinically suspected, more dedicated imaging can be obtained.        < from: CT Abdomen and Pelvis w/ IV Cont (20 @ 16:29) >  Colonic diverticulosis and diverticulitis is again noted. Compared with the previous study there is increased wall thickening around a diverticulum seen along the posterior margin of the sigmoid colon. (Series 2, image [id number]. There is increased gas and fluid within this diverticulum. The findings are compatible with an intramural abscess in this location.    < end of copied text >      MICROBIOLOGY DATA:    Urine Microscopic-Add On (NC) (20 @ 10:30)    White Blood Cell - Urine: 1-2 /HPF    Bacteria: Few    Epithelial Cells: Occasional /HPF      Urine Microscopic-Add On (NC) (20 @ 16:10)    Epithelial Cells: Occasional /HPF    White Blood Cell - Urine: 1-2 /HPF        Culture - Blood (20 @ 15:25)    Specimen Source: .Blood None    Culture Results:   No growth to date.        Culture - Blood (20 @ 15:25)    Specimen Source: .Blood None    Culture Results:   No growth to date. Patient is seen and examined at the bed side, is afebrile today.  She is scheduled for IR guided drainage tomorrow.  The Leukocytosis trended down to normal.        REVIEW OF SYSTEMS: All other review systems are negative        Vital Signs Last 24 Hrs  T(C): 36.8 (2020 14:11), Max: 37.1 (2020 05:22)  T(F): 98.3 (2020 14:11), Max: 98.8 (2020 05:22)  HR: 84 (2020 14:11) (84 - 86)  BP: 115/57 (2020 14:11) (115/57 - 116/72)  BP(mean): --  RR: 16 (2020 14:11) (16 - 16)  SpO2: --      PHYSICAL EXAM:  GENERAL: Not in distress  CHEST/LUNG:  Air entry bilaterally  HEART: s1 and s2 present   ABDOMEN: LLQ tenderness  EXTREMITIES:  No pedal  edema  CNS: Awake AND Alert        MEDICATIONS  (STANDING):  chlorhexidine 4% Liquid 1 Application(s) Topical <User Schedule>  heparin  Injectable 5000 Unit(s) SubCutaneous every 12 hours  levothyroxine 25 MICROGram(s) Oral daily  meropenem  IVPB 1000 milliGRAM(s) IV Intermittent every 8 hours  Parenteral Nutrition - Adult 1 Each (65 mL/Hr) TPN Continuous <Continuous>  Parenteral Nutrition - Adult 1 Each (65 mL/Hr) TPN Continuous <Continuous>    MEDICATIONS  (PRN):  acetaminophen   Tablet .. 650 milliGRAM(s) Oral every 6 hours PRN Temp greater or equal to 38C (100.4F)  aluminum hydroxide/magnesium hydroxide/simethicone Suspension 30 milliLiter(s) Oral every 6 hours PRN Dyspepsia  morphine  - Injectable 2 milliGRAM(s) IV Push every 3 hours PRN Severe Pain (7 - 10)          Allergies    No Known Allergies          LABS:                        11.8   9.12  )-----------( 310      ( 2020 07:01 )             34.5                           11.0   20.88 )-----------( 567      ( 10 Feb 2020 09:52 )             32.5         02-12    139  |  103  |  19  ----------------------------<  119<H>  4.0   |  22  |  0.6<L>    Ca    8.7      2020 07:01  Phos  3.2     02-12  Mg     2.1     02-12      02-10    139  |  100  |  21<H>  ----------------------------<  130<H>  4.1   |  22  |  0.5<L>    Ca    8.8      10 Feb 2020 09:52  Phos  3.2     -10  Mg     2.1     02-10    TPro  6.4  /  Alb  3.5  /  TBili  <0.2  /  DBili  x   /  AST  27  /  ALT  15  /  AlkPhos  118<H>      PT/INR - ( 10 Feb 2020 21:35 )   PT: 15.60 sec;   INR: 1.36 ratio    PTT - ( 10 Feb 2020 21:35 )  PTT:33.9 sec      Urinalysis Basic - ( 2020 10:30 )  Color: Yellow / Appearance: Clear / S.025 / pH: x  Gluc: x / Ketone: Negative  / Bili: Negative / Urobili: 0.2 mg/dL   Blood: x / Protein: 30 mg/dL / Nitrite: Negative   Leuk Esterase: Negative / RBC: x / WBC 1-2 /HPF   Sq Epi: x / Non Sq Epi: Occasional /HPF / Bacteria: Few      CAPILLARY BLOOD GLUCOSE  POCT Blood Glucose.: 105 mg/dL (10 Feb 2020 21:00)  POCT Blood Glucose.: 129 mg/dL (10 Feb 2020 17:08)        RADIOLOGY & ADDITIONAL TESTS:    < from: CT Abdomen and Pelvis w/ IV Cont (20 @ 17:26) >  Interval increase in size of a perirectal abscess now measuring up to 6.5 cm as detailed above.  A few small locules of air are noted within the vagina for which there is limited evaluation on this exam to exclude underlying colovaginal fistula. If this is clinically suspected, more dedicated imaging can be obtained.        < from: CT Abdomen and Pelvis w/ IV Cont (20 @ 16:29) >  Colonic diverticulosis and diverticulitis is again noted. Compared with the previous study there is increased wall thickening around a diverticulum seen along the posterior margin of the sigmoid colon. (Series 2, image [id number]. There is increased gas and fluid within this diverticulum. The findings are compatible with an intramural abscess in this location.    < end of copied text >      MICROBIOLOGY DATA:    Urine Microscopic-Add On (NC) (20 @ 10:30)    White Blood Cell - Urine: 1-2 /HPF    Bacteria: Few    Epithelial Cells: Occasional /HPF      Urine Microscopic-Add On (NC) (20 @ 16:10)    Epithelial Cells: Occasional /HPF    White Blood Cell - Urine: 1-2 /HPF        Culture - Blood (20 @ 15:25)    Specimen Source: .Blood None    Culture Results:   No growth to date.        Culture - Blood (20 @ 15:25)    Specimen Source: .Blood None    Culture Results:   No growth to date.

## 2020-02-13 LAB
ALBUMIN SERPL ELPH-MCNC: 3.2 G/DL — LOW (ref 3.5–5.2)
ALP SERPL-CCNC: 170 U/L — HIGH (ref 30–115)
ALT FLD-CCNC: 27 U/L — SIGNIFICANT CHANGE UP (ref 0–41)
ANION GAP SERPL CALC-SCNC: 12 MMOL/L — SIGNIFICANT CHANGE UP (ref 7–14)
AST SERPL-CCNC: 28 U/L — SIGNIFICANT CHANGE UP (ref 0–41)
BILIRUB SERPL-MCNC: <0.2 MG/DL — SIGNIFICANT CHANGE UP (ref 0.2–1.2)
BUN SERPL-MCNC: 23 MG/DL — HIGH (ref 10–20)
CALCIUM SERPL-MCNC: 8.9 MG/DL — SIGNIFICANT CHANGE UP (ref 8.5–10.1)
CHLORIDE SERPL-SCNC: 105 MMOL/L — SIGNIFICANT CHANGE UP (ref 98–110)
CO2 SERPL-SCNC: 26 MMOL/L — SIGNIFICANT CHANGE UP (ref 17–32)
CREAT SERPL-MCNC: 0.6 MG/DL — LOW (ref 0.7–1.5)
GLUCOSE SERPL-MCNC: 104 MG/DL — HIGH (ref 70–99)
MAGNESIUM SERPL-MCNC: 2.1 MG/DL — SIGNIFICANT CHANGE UP (ref 1.8–2.4)
PHOSPHATE SERPL-MCNC: 3.4 MG/DL — SIGNIFICANT CHANGE UP (ref 2.1–4.9)
POTASSIUM SERPL-MCNC: 4.3 MMOL/L — SIGNIFICANT CHANGE UP (ref 3.5–5)
POTASSIUM SERPL-SCNC: 4.3 MMOL/L — SIGNIFICANT CHANGE UP (ref 3.5–5)
PROT SERPL-MCNC: 6 G/DL — SIGNIFICANT CHANGE UP (ref 6–8)
SODIUM SERPL-SCNC: 143 MMOL/L — SIGNIFICANT CHANGE UP (ref 135–146)

## 2020-02-13 PROCEDURE — 72191 CT ANGIOGRAPH PELV W/O&W/DYE: CPT | Mod: 26

## 2020-02-13 PROCEDURE — 99233 SBSQ HOSP IP/OBS HIGH 50: CPT

## 2020-02-13 RX ORDER — ELECTROLYTE SOLUTION,INJ
1 VIAL (ML) INTRAVENOUS
Refills: 0 | Status: DISCONTINUED | OUTPATIENT
Start: 2020-02-13 | End: 2020-02-13

## 2020-02-13 RX ORDER — I.V. FAT EMULSION 20 G/100ML
1.85 EMULSION INTRAVENOUS
Qty: 100.27 | Refills: 0 | Status: DISCONTINUED | OUTPATIENT
Start: 2020-02-13 | End: 2020-02-13

## 2020-02-13 RX ADMIN — MEROPENEM 100 MILLIGRAM(S): 1 INJECTION INTRAVENOUS at 05:07

## 2020-02-13 RX ADMIN — MEROPENEM 100 MILLIGRAM(S): 1 INJECTION INTRAVENOUS at 13:21

## 2020-02-13 RX ADMIN — Medication 25 MICROGRAM(S): at 05:06

## 2020-02-13 RX ADMIN — I.V. FAT EMULSION 31.33 GM/KG/DAY: 20 EMULSION INTRAVENOUS at 20:14

## 2020-02-13 RX ADMIN — Medication 1 EACH: at 20:13

## 2020-02-13 RX ADMIN — MEROPENEM 100 MILLIGRAM(S): 1 INJECTION INTRAVENOUS at 21:15

## 2020-02-13 NOTE — PROGRESS NOTE ADULT - SUBJECTIVE AND OBJECTIVE BOX
Patient Age:  65y    Patient Gender:  Female    Procedure (including site / side if known):  CT-guided Drainage of LLQ Fluid Collection    Diagnosis / Indication:  Increased size of collection, likely an abscess    Interventional Radiology Attending Physician:  Dr. Corona    Ordering Attending Physician:  Dr. Cole    Pertinent Medical History:  Diverticulitis    PAST MEDICAL & SURGICAL HISTORY:  Diverticulitis  Hypothyroidism  H/O tubal ligation      Allergies:  No Known Allergies      Pertinent Labs:                        11.3   10.09 )-----------( 653      ( 13 Feb 2020 08:18 )             34.0       02-13    143  |  105  |  23<H>  ----------------------------<  104<H>  4.3   |  26  |  0.6<L>    Ca    8.9      13 Feb 2020 08:18  Phos  3.4     02-13  Mg     2.1     02-13    TPro  6.0  /  Alb  3.2<L>  /  TBili  <0.2  /  DBili  x   /  AST  28  /  ALT  27  /  AlkPhos  170<H>  02-13    Consentable:  Yes    NPO:  Yes    Code Status:  Full Code     Patient and Family aware:   Yes      Risks, benefits, and alternatives to treatment discussed. All questions answered with understanding.    Please call [phone] with any further questions.

## 2020-02-13 NOTE — PROGRESS NOTE ADULT - SUBJECTIVE AND OBJECTIVE BOX
HPI:  Pt feels better, denies much abd pain n/v, f/c. Pt is NPO for IR drainage today      PAST MEDICAL & SURGICAL HISTORY:  Diverticulitis  Hypothyroidism  H/O tubal ligation      Allergies    No Known Allergies          MEDICATIONS  (STANDING):  chlorhexidine 4% Liquid 1 Application(s) Topical <User Schedule>  fat emulsion (Plant Based) 20% Infusion 1.85 Gm/kG/Day (31.334 mL/Hr) IV Continuous <Continuous>  heparin  Injectable 5000 Unit(s) SubCutaneous every 12 hours  levothyroxine 25 MICROGram(s) Oral daily  meropenem  IVPB 1000 milliGRAM(s) IV Intermittent every 8 hours  Parenteral Nutrition - Adult 1 Each (65 mL/Hr) TPN Continuous <Continuous>    MEDICATIONS  (PRN):  acetaminophen   Tablet .. 650 milliGRAM(s) Oral every 6 hours PRN Temp greater or equal to 38C (100.4F)  aluminum hydroxide/magnesium hydroxide/simethicone Suspension 30 milliLiter(s) Oral every 6 hours PRN Dyspepsia  morphine  - Injectable 2 milliGRAM(s) IV Push every 3 hours PRN Severe Pain (7 - 10)      ROS:  General:	no fever, weight loss,  chills  Respiratory and Thorax: no cough, wheeze,  sob  Cardiovascular:	no chest pain, palpitations, dizziness  Gastrointestinal:	no nausea, vomiting, diarrhea, mild abd pain  Genitourinary:	no dysuria, hematuria        Vital Signs Last 24 Hrs  T(C): 36.8 (13 Feb 2020 05:24), Max: 36.8 (12 Feb 2020 14:11)  T(F): 98.2 (13 Feb 2020 05:24), Max: 98.3 (12 Feb 2020 14:11)  HR: 78 (13 Feb 2020 05:24) (78 - 85)  BP: 118/66 (13 Feb 2020 05:24) (115/57 - 129/65)  BP(mean): --  RR: 16 (13 Feb 2020 05:24) (16 - 16)  SpO2: --      PHYSICAL EXAM:      Constitutional: A&Ox4  Respiratory: cta b/l  Cardiovascular: s1 s2 rrr  Gastrointestinal: soft mild LLQ tend, improving  nd + bs no rebound or guarding  Genitourinary: no cva tenderness  Extremities: normal rom, no edema, calf tenderness                                  11.3   10.09 )-----------( 653      ( 13 Feb 2020 08:18 )             34.0       02-13    143  |  105  |  23<H>  ----------------------------<  104<H>  4.3   |  26  |  0.6<L>    Ca    8.9      13 Feb 2020 08:18  Phos  3.4     02-13  Mg     2.1     02-13    TPro  6.0  /  Alb  3.2<L>  /  TBili  <0.2  /  DBili  x   /  AST  28  /  ALT  27  /  AlkPhos  170<H>  02-13          Magnesium, Serum: 2.1 mg/dL (02-13-20 @ 08:18)  Phosphorus Level, Serum: 3.4 mg/dL (02-13-20 @ 08:18

## 2020-02-13 NOTE — CHART NOTE - NSCHARTNOTEFT_GEN_A_CORE
Pt returned from IR. As per CT Scan done while in IR in prone position the fluid collection has resolved and is now very minimal, therefore drainage is not indicated at this time.    D/W DR Cole. He spoke with IR and is aware. Pt may start clears tomorrow and SLOWLY advance from there as long as she remains clinically stable. He would like clears for 24 h and then low residue for 24h with an anticipated DC date of 2/16 should everything go smoothly.     All D/W pt she understands.

## 2020-02-13 NOTE — CHART NOTE - NSCHARTNOTEFT_GEN_A_CORE
Patient seen and examined throughout the course of the day.     Pt states she feels "much better now". Denies any new medical complaints at this time.     All patient's/family questions answered.   Patient and family encouraged to contact PA with any further issues.

## 2020-02-13 NOTE — PROGRESS NOTE ADULT - ASSESSMENT
Pt is a 64yo with perforated diverticulitis with abscess. Pt is awaiting transfer to [facility] for IR drainage today. Transfer to [facility] was denied by insurance    -c/w NPO, PPN, iv abx  -IR drainage today  - will follow      All above d/w DR Cole and surgical team

## 2020-02-13 NOTE — PROGRESS NOTE ADULT - SUBJECTIVE AND OBJECTIVE BOX
Interventional Radiology Follow-Up Note      65y female who presents for CT-guided pelvic drainage of presumed diverticular abscess.        Vitals: T(F): 98.2 (02-13-20 @ 05:24), Max: 98.2 (02-13-20 @ 05:24)  HR: 78 (02-13-20 @ 05:24) (78 - 85)  BP: 118/66 (02-13-20 @ 05:24) (118/66 - 129/65)  RR: 16 (02-13-20 @ 05:24) (16 - 16)  SpO2: --  Wt(kg): --    LABS:                        11.3   10.09 )-----------( 653      ( 13 Feb 2020 08:18 )             34.0     02-13    143  |  105  |  23<H>  ----------------------------<  104<H>  4.3   |  26  |  0.6<L>    Ca    8.9      13 Feb 2020 08:18  Phos  3.4     02-13  Mg     2.1     02-13    TPro  6.0  /  Alb  3.2<L>  /  TBili  <0.2  /  DBili  x   /  AST  28  /  ALT  27  /  AlkPhos  170<H>  02-13    PHYSICAL EXAM:  General:  Non-toxic, in NAD  Neuro:  Alert & oriented x 3  CV:  +S1+S2 regular rate and rhythm  Lung:  Clear to ausculation bilaterally, respirations nonlabored, good inspiratory effort  Abdomen:  Soft, NTND. Normoactive BS  Extremities:  No pedal edema or calf tenderness noted       Impression:  65y Female admitted with DIVERTICULITIS OF INTESTINE WITH ABSCESS.   Imaging performed today in prone position without and following intravenously administered contrast demonstrates little to no drainable fluid; rather, there is a predominantly air-distended diverticulum as demonstrated on prior CT scans.  As such, drainage is not indicated.  Findings discussed at length with the patient, her  (and Dr. Cole via telephone with read-back).        Plan:  No drainable collection at this time.  Please f/u with primary team.       Please call Interventional Radiology [phone] with any questions, concerns, or issues regarding above.

## 2020-02-14 DIAGNOSIS — K57.80 DIVERTICULITIS OF INTESTINE, PART UNSPECIFIED, WITH PERFORATION AND ABSCESS WITHOUT BLEEDING: ICD-10-CM

## 2020-02-14 LAB
ANION GAP SERPL CALC-SCNC: 9 MMOL/L — SIGNIFICANT CHANGE UP (ref 7–14)
BUN SERPL-MCNC: 20 MG/DL — SIGNIFICANT CHANGE UP (ref 10–20)
CALCIUM SERPL-MCNC: 8.8 MG/DL — SIGNIFICANT CHANGE UP (ref 8.5–10.1)
CHLORIDE SERPL-SCNC: 108 MMOL/L — SIGNIFICANT CHANGE UP (ref 98–110)
CO2 SERPL-SCNC: 23 MMOL/L — SIGNIFICANT CHANGE UP (ref 17–32)
CREAT SERPL-MCNC: <0.5 MG/DL — LOW (ref 0.7–1.5)
GLUCOSE SERPL-MCNC: 108 MG/DL — HIGH (ref 70–99)
HCT VFR BLD CALC: 33.6 % — LOW (ref 37–47)
HGB BLD-MCNC: 11.6 G/DL — LOW (ref 12–16)
MAGNESIUM SERPL-MCNC: 2.1 MG/DL — SIGNIFICANT CHANGE UP (ref 1.8–2.4)
MCHC RBC-ENTMCNC: 33.4 PG — HIGH (ref 27–31)
MCHC RBC-ENTMCNC: 34.5 G/DL — SIGNIFICANT CHANGE UP (ref 32–37)
MCV RBC AUTO: 96.8 FL — SIGNIFICANT CHANGE UP (ref 81–99)
NRBC # BLD: 0 /100 WBCS — SIGNIFICANT CHANGE UP (ref 0–0)
PHOSPHATE SERPL-MCNC: 3.4 MG/DL — SIGNIFICANT CHANGE UP (ref 2.1–4.9)
PLATELET # BLD AUTO: 598 K/UL — HIGH (ref 130–400)
POTASSIUM SERPL-MCNC: 4.2 MMOL/L — SIGNIFICANT CHANGE UP (ref 3.5–5)
POTASSIUM SERPL-SCNC: 4.2 MMOL/L — SIGNIFICANT CHANGE UP (ref 3.5–5)
RBC # BLD: 3.47 M/UL — LOW (ref 4.2–5.4)
RBC # FLD: 12.1 % — SIGNIFICANT CHANGE UP (ref 11.5–14.5)
SODIUM SERPL-SCNC: 140 MMOL/L — SIGNIFICANT CHANGE UP (ref 135–146)
WBC # BLD: 11.32 K/UL — HIGH (ref 4.8–10.8)
WBC # FLD AUTO: 11.32 K/UL — HIGH (ref 4.8–10.8)

## 2020-02-14 PROCEDURE — 99233 SBSQ HOSP IP/OBS HIGH 50: CPT

## 2020-02-14 RX ORDER — ELECTROLYTE SOLUTION,INJ
1 VIAL (ML) INTRAVENOUS
Refills: 0 | Status: DISCONTINUED | OUTPATIENT
Start: 2020-02-14 | End: 2020-02-15

## 2020-02-14 RX ADMIN — MEROPENEM 100 MILLIGRAM(S): 1 INJECTION INTRAVENOUS at 05:21

## 2020-02-14 RX ADMIN — Medication 25 MICROGRAM(S): at 05:21

## 2020-02-14 RX ADMIN — MEROPENEM 100 MILLIGRAM(S): 1 INJECTION INTRAVENOUS at 21:35

## 2020-02-14 RX ADMIN — MEROPENEM 100 MILLIGRAM(S): 1 INJECTION INTRAVENOUS at 13:05

## 2020-02-14 RX ADMIN — CHLORHEXIDINE GLUCONATE 1 APPLICATION(S): 213 SOLUTION TOPICAL at 05:20

## 2020-02-14 NOTE — PROGRESS NOTE ADULT - SUBJECTIVE AND OBJECTIVE BOX
KIM WEISS  65y, Female  Allergy: No Known Allergies    Hospital Day: 16d    Patient seen and examined earlier today. No acute events overnight.    PMH/PSH:  PAST MEDICAL & SURGICAL HISTORY:  Diverticulitis  Hypothyroidism  H/O tubal ligation    VITALS:  T(F): 98.5 (02-14-20 @ 05:28), Max: 98.5 (02-14-20 @ 05:28)  HR: 90 (02-14-20 @ 05:28)  BP: 109/58 (02-14-20 @ 05:28) (109/58 - 125/58)  RR: 16 (02-14-20 @ 05:28)  SpO2: --    TESTS & MEASUREMENTS:  Weight (Kg):       02-13-20 @ 07:01  -  02-14-20 @ 07:00  --------------------------------------------------------  IN: 780 mL / OUT: 0 mL / NET: 780 mL                            11.6   11.32 )-----------( 598      ( 14 Feb 2020 06:12 )             33.6     02-14    140  |  108  |  20  ----------------------------<  108<H>  4.2   |  23  |  <0.5<L>    Ca    8.8      14 Feb 2020 06:12  Phos  3.4     02-14  Mg     2.1     02-14    TPro  6.0  /  Alb  3.2<L>  /  TBili  <0.2  /  DBili  x   /  AST  28  /  ALT  27  /  AlkPhos  170<H>  02-13    LIVER FUNCTIONS - ( 13 Feb 2020 08:18 )  Alb: 3.2 g/dL / Pro: 6.0 g/dL / ALK PHOS: 170 U/L / ALT: 27 U/L / AST: 28 U/L / GGT: x           Culture - Blood (collected 02-11-20 @ 08:00)  Source: .Blood None  Preliminary Report (02-12-20 @ 23:01):    No growth to date.    Culture - Blood (collected 02-07-20 @ 15:25)  Source: .Blood None  Final Report (02-12-20 @ 23:00):    No growth at 5 days.    Culture - Blood (collected 02-07-20 @ 15:25)  Source: .Blood None  Final Report (02-12-20 @ 23:00):    No growth at 5 days.    RECENT DIAGNOSTIC ORDERS:  IR Procedure: 16:34  Exam Completed (02-13-20 @ 15:06)  Diet, Full Liquid (02-14-20 @ 08:56)    MEDICATIONS:  MEDICATIONS  (STANDING):  chlorhexidine 4% Liquid 1 Application(s) Topical <User Schedule>  fat emulsion (Plant Based) 20% Infusion 1.85 Gm/kG/Day (31.334 mL/Hr) IV Continuous <Continuous>  heparin  Injectable 5000 Unit(s) SubCutaneous every 12 hours  levothyroxine 25 MICROGram(s) Oral daily  meropenem  IVPB 1000 milliGRAM(s) IV Intermittent every 8 hours  Parenteral Nutrition - Adult 1 Each (65 mL/Hr) TPN Continuous <Continuous>    MEDICATIONS  (PRN):  acetaminophen   Tablet .. 650 milliGRAM(s) Oral every 6 hours PRN Temp greater or equal to 38C (100.4F)  aluminum hydroxide/magnesium hydroxide/simethicone Suspension 30 milliLiter(s) Oral every 6 hours PRN Dyspepsia  morphine  - Injectable 2 milliGRAM(s) IV Push every 3 hours PRN Severe Pain (7 - 10)    HOME MEDICATIONS:  levothyroxine 25 mcg (0.025 mg) oral tablet (02-11)  Lypholyte II/Nutrilyte II/TPN Electrolytes intravenous solution (02-11)  meropenem 1000 mg intravenous injection (02-11)    REVIEW OF SYSTEMS:  All other review of systems is negative unless indicated above.     PHYSICAL EXAM:  GENERAL: NAD  HEENT: No Swelling  CHEST/LUNG: Good air entry, No wheezing  HEART: RRR, No murmurs  ABDOMEN: Soft, Bowel sounds present  EXTREMITIES:  No clubbing

## 2020-02-14 NOTE — PROGRESS NOTE ADULT - SUBJECTIVE AND OBJECTIVE BOX
HPI:  65 yo F with PMHx of Hypothyroidism, Recurrent Diverticulitis, recently admitted at [facility] and discharged on 01/03/2020 for Diverticulitis failed on outpatient antibiotics, presented with complaint of dull, at times radiating to back left lower quadrant abdominal pain associated with decreased appetite since Sunday, pain currently 8/10, although patient appears comfortable. Patient was seen by GI yesterday, reported to have constipation. Patient denies fever, chills, nausea, vomiting, shortness of breath, last bowel movement this morning, positive flatus. (29 Jan 2020 19:37)    Now on TPN and NPO.  No abscess on CT by IR    PAST MEDICAL & SURGICAL HISTORY:  Diverticulitis  Hypothyroidism  H/O tubal ligation      REVIEW OF SYSTEMS:    CONSTITUTIONAL: No weakness, fevers or chills  EYES/ENT: No visual changes;  No vertigo or throat pain   NECK: No pain or stiffness  RESPIRATORY: No cough, wheezing, hemoptysis; No shortness of breath  CARDIOVASCULAR: No chest pain or palpitations  GASTROINTESTINAL: Abdominal pain and rectal pain, No nausea, vomiting, or hematemesis; No diarrhea or constipation. No melena or hematochezia.  GENITOURINARY: No dysuria, frequency or hematuria  NEUROLOGICAL: No numbness or weakness  SKIN: No itching, rashes      MEDICATIONS  (STANDING):  acetaminophen   Tablet .. 650 milliGRAM(s) Oral every 6 hours  chlorhexidine 4% Liquid 1 Application(s) Topical <User Schedule>  ertapenem  IVPB 1000 milliGRAM(s) IV Intermittent every 24 hours  fat emulsion (Plant Based) 20% Infusion 1.7 Gm/kG/Day (31.397 mL/Hr) IV Continuous <Continuous>  lactated ringers. 1000 milliLiter(s) (75 mL/Hr) IV Continuous <Continuous>  levothyroxine 25 MICROGram(s) Oral daily  Parenteral Nutrition - Adult 1 Each (70 mL/Hr) TPN Continuous <Continuous>    MEDICATIONS  (PRN):  morphine  - Injectable 1 milliGRAM(s) IV Push every 6 hours PRN Moderate Pain (4 - 6)      Allergies    No Known Allergies    Intolerances        SOCIAL HISTORY:    FAMILY HISTORY:  FH: myocardial infarction: mother  FH: diabetes mellitus: mother    Vital Signs Last 24 Hrs  T(C): 36.2 (14 Feb 2020 13:56), Max: 36.9 (14 Feb 2020 05:28)  T(F): 97.2 (14 Feb 2020 13:56), Max: 98.5 (14 Feb 2020 05:28)  HR: 89 (14 Feb 2020 13:56) (80 - 90)  BP: 122/63 (14 Feb 2020 13:56) (109/58 - 125/58)  BP(mean): --  RR: 16 (14 Feb 2020 13:56) (16 - 16)  SpO2: --    PHYSICAL EXAM:  GENERAL: NAD, well-developed, well nourished, looks stated age  HEAD:  Atraumatic, Normocephalic  EYES: EOMI, PERRLA, conjunctiva and sclera clear  NECK: Supple, No JVD, no bruits, no masses, no thyroid enlargement  ENMT: No tonsillar erythema, exudated or enlargement, moist mucous membranes  CHEST/LUNG: Clear to auscultation bilaterally; No rales, rhonchi or wheezing, no dullness to percussion  HEART: S1,S2 Regular rate and rhythm; No murmurs, rubs, or gallops  ABDOMEN: Soft, non tender, nondistended, no rebound tenderness; No palpable masses, no hernias, no organomegaly; Bowel sounds present and normoactive  EXTREMITIES:  2+ peripheral pulses bilaterally and symmetrically, no clubbing, cyanosis, or edema  LYMPH: No lymphadenopathy  PSYCH: AAOx3, normal mood and affect  NEUROLOGY: non-focal, muscle strength 5/5 all extremities, DTRs 2+ symmetrically  SKIN: No rashes or lesions                                11.6   11.32 )-----------( 598      ( 14 Feb 2020 06:12 )             33.6     02-14    140  |  108  |  20  ----------------------------<  108<H>  4.2   |  23  |  <0.5<L>    Ca    8.8      14 Feb 2020 06:12  Phos  3.4     02-14  Mg     2.1     02-14    TPro  6.0  /  Alb  3.2<L>  /  TBili  <0.2  /  DBili  x   /  AST  28  /  ALT  27  /  AlkPhos  170<H>  02-13    LIVER FUNCTIONS - ( 13 Feb 2020 08:18 )  Alb: 3.2 g/dL / Pro: 6.0 g/dL / ALK PHOS: 170 U/L / ALT: 27 U/L / AST: 28 U/L / GGT: x                                 RADIOLOGY & ADDITIONAL STUDIES:    CT diverticulitis with abscess

## 2020-02-14 NOTE — PROGRESS NOTE ADULT - ASSESSMENT
Pt is a 64yo with perforated diverticulitis with abscess.   As per IR , abscess resolving and no drainage needed     - start clears and advance slowly when tolerated   - PPN, iv abx  - will follow      All above d/w surgical team

## 2020-02-14 NOTE — PROGRESS NOTE ADULT - ASSESSMENT
64F PMHx hypothyroidism, recurrent diverticulitis (1/2020) here with abd pain, found to have diverticulitis with fluid collection.    1. Acute diverticulitis, c/b abscess: Repeat CT 02/09 showing increase in size of perirectal abscess. Transfer to [facility] denied by insurance. PT went for IR guided aspiration 02/13, but procedure cancelled as abscess had resolved. WIll advance diet slowly and anticipate DC on 02/16.  2. Hypothyroidism: Cont synthroid    DVT/GI PPX      #Progress Note Handoff  Pending (specify): Advancing diet  Family discussion: d/w pt regarding anticipate DC on Sunday  Disposition: Home

## 2020-02-14 NOTE — PROGRESS NOTE ADULT - ASSESSMENT
ASSESSMENT/PLAN  64F PMHx hypothyroidism, recurrent diverticulitis (1/2020) here with abd pain, found to have diverticulitis with fluid collection.  Transfer to [facility] denied by insurance. PT went for IR guided aspiration 02/13, but procedure cancelled as abscess had resolved  spoke with hospitalist   continue TPN today   by tomorrow taper and d/c TPN  for pt diet is well cooked vegetable and peeled fruit   check bmp/phos/mg and correct lytes  calorie count

## 2020-02-14 NOTE — PROGRESS NOTE ADULT - SUBJECTIVE AND OBJECTIVE BOX
Patient is a 65y old  Female who presents with a chief complaint of Diverticulitis (14 Feb 2020 09:57)      Last 24 hrs:         ICU Vital Signs Last 24 Hrs  T(C): 36.9 (14 Feb 2020 05:28), Max: 36.9 (14 Feb 2020 05:28)  T(F): 98.5 (14 Feb 2020 05:28), Max: 98.5 (14 Feb 2020 05:28)  HR: 90 (14 Feb 2020 05:28) (80 - 90)  BP: 109/58 (14 Feb 2020 05:28) (109/58 - 125/58)  BP(mean): --  ABP: --  ABP(mean): --  RR: 16 (14 Feb 2020 05:28) (16 - 16)  SpO2: --      Drug Dosing Weight  Height (cm): 165.1 (05 Feb 2020 08:01)  Weight (kg): 54.2 (05 Feb 2020 08:01)  BMI (kg/m2): 19.9 (05 Feb 2020 08:01)  BSA (m2): 1.59 (05 Feb 2020 08:01)    I&O's Detail    13 Feb 2020 07:01  -  14 Feb 2020 07:00  --------------------------------------------------------  IN:    PPN (Peripheral Parenteral Nutrition): 780 mL  Total IN: 780 mL    OUT:  Total OUT: 0 mL    Total NET: 780 mL           PHYSICAL EXAM:    Constitutional:    ENMT:    Neck:    Breasts:    Respiratory:    Gastrointestinal:    Genitourinary:    Rectal:    Extremities    Neurological:    Skin:    Lymph Nodes:    IV access:    MEDICATIONS  (STANDING):  chlorhexidine 4% Liquid 1 Application(s) Topical <User Schedule>  fat emulsion (Plant Based) 20% Infusion 1.85 Gm/kG/Day (31.334 mL/Hr) IV Continuous <Continuous>  heparin  Injectable 5000 Unit(s) SubCutaneous every 12 hours  levothyroxine 25 MICROGram(s) Oral daily  meropenem  IVPB 1000 milliGRAM(s) IV Intermittent every 8 hours  Parenteral Nutrition - Adult 1 Each (65 mL/Hr) TPN Continuous <Continuous>      Diet, Full Liquid (02-14-20 @ 08:56)      LABS  02-14    140  |  108  |  20  ----------------------------<  108<H>  4.2   |  23  |  <0.5<L>    Ca    8.8      14 Feb 2020 06:12  Phos  3.4     02-14  Mg     2.1     02-14    TPro  6.0  /  Alb  3.2<L>  /  TBili  <0.2  /  DBili  x   /  AST  28  /  ALT  27  /  AlkPhos  170<H>  02-13                          11.6   11.32 )-----------( 598      ( 14 Feb 2020 06:12 )             33.6     CAPILLARY BLOOD GLUCOSE           RADIOLOGY STUDIES Patient is a 65y old  Female who presents with a chief complaint of Diverticulitis (14 Feb 2020 09:57)  pt seen and evaluated this morning she was able to tolerate a cup of broth  spoke with hospitalist   TPN for tonight can be tapered and d/c tomorrow   ICU Vital Signs Last 24 Hrs  T(C): 36.9 (14 Feb 2020 05:28), Max: 36.9 (14 Feb 2020 05:28)  T(F): 98.5 (14 Feb 2020 05:28), Max: 98.5 (14 Feb 2020 05:28)  HR: 90 (14 Feb 2020 05:28) (80 - 90)  BP: 109/58 (14 Feb 2020 05:28) (109/58 - 125/58)  RR: 16 (14 Feb 2020 05:28) (16 - 16)    Drug Dosing Weight  Height (cm): 165.1 (05 Feb 2020 08:01)  Weight (kg): 54.2 (05 Feb 2020 08:01)  BMI (kg/m2): 19.9 (05 Feb 2020 08:01)  BSA (m2): 1.59 (05 Feb 2020 08:01)    I&O's Detail    13 Feb 2020 07:01  -  14 Feb 2020 07:00  --------------------------------------------------------  IN:    PPN (Peripheral Parenteral Nutrition): 780 mL  Total IN: 780 mL    OUT:  Total OUT: 0 mL    Total NET: 780 mL     PHYSICAL EXAM:  Constitutional:  alert and oriented   gastrointestinal:  soft n/d  IV access: picc line sie c/d/i  MEDICATIONS  (STANDING):  chlorhexidine 4% Liquid 1 Application(s) Topical <User Schedule>  fat emulsion (Plant Based) 20% Infusion 1.85 Gm/kG/Day (31.334 mL/Hr) IV Continuous <Continuous>  heparin  Injectable 5000 Unit(s) SubCutaneous every 12 hours  levothyroxine 25 MICROGram(s) Oral daily  meropenem  IVPB 1000 milliGRAM(s) IV Intermittent every 8 hours  Parenteral Nutrition - Adult 1 Each (65 mL/Hr) TPN Continuous <Continuous>      Diet, Full Liquid (02-14-20 @ 08:56)      LABS  02-14    140  |  108  |  20  ----------------------------<  108<H>  4.2   |  23  |  <0.5<L>    Ca    8.8      14 Feb 2020 06:12  Phos  3.4     02-14  Mg     2.1     02-14    TPro  6.0  /  Alb  3.2<L>  /  TBili  <0.2  /  DBili  x   /  AST  28  /  ALT  27  /  AlkPhos  170<H>  02-13                          11.6   11.32 )-----------( 598      ( 14 Feb 2020 06:12 )             33.6

## 2020-02-14 NOTE — PROGRESS NOTE ADULT - ASSESSMENT
No Abdominal pain   Diverticulitis with abscess, decreased in size  Decreased WBC count    Plan   Continue diet  Continue IV antibiotics  Monitor CBC  Probable d/c home this weekend

## 2020-02-14 NOTE — PROGRESS NOTE ADULT - SUBJECTIVE AND OBJECTIVE BOX
Pt feels better, denies much abd pain n/v, f/c. Pt wants to eat   ambulates +    As per CT Scan done while in IR in prone position the fluid collection has resolved and is now very minimal, therefore drainage is not indicated at this time.        PAST MEDICAL & SURGICAL HISTORY:  Diverticulitis  Hypothyroidism  H/O tubal ligation      Allergies    No Known Allergies      MEDICATIONS  (STANDING):  chlorhexidine 4% Liquid 1 Application(s) Topical <User Schedule>  fat emulsion (Plant Based) 20% Infusion 1.85 Gm/kG/Day (31.334 mL/Hr) IV Continuous <Continuous>  heparin  Injectable 5000 Unit(s) SubCutaneous every 12 hours  levothyroxine 25 MICROGram(s) Oral daily  meropenem  IVPB 1000 milliGRAM(s) IV Intermittent every 8 hours  Parenteral Nutrition - Adult 1 Each (65 mL/Hr) TPN Continuous <Continuous>    MEDICATIONS  (PRN):  acetaminophen   Tablet .. 650 milliGRAM(s) Oral every 6 hours PRN Temp greater or equal to 38C (100.4F)  aluminum hydroxide/magnesium hydroxide/simethicone Suspension 30 milliLiter(s) Oral every 6 hours PRN Dyspepsia  morphine  - Injectable 2 milliGRAM(s) IV Push every 3 hours PRN Severe Pain (7 - 10)      ROS:  General:	no fever, weight loss,  chills  Respiratory and Thorax: no cough, wheeze,  sob  Cardiovascular:	no chest pain, palpitations, dizziness  Gastrointestinal:	no nausea, vomiting, diarrhea, mild abd pain  Genitourinary:	no dysuria, hematuria    Vital Signs Last 24 Hrs  T(C): 36.9 (14 Feb 2020 05:28), Max: 36.9 (14 Feb 2020 05:28)  T(F): 98.5 (14 Feb 2020 05:28), Max: 98.5 (14 Feb 2020 05:28)  HR: 90 (14 Feb 2020 05:28) (80 - 90)  BP: 109/58 (14 Feb 2020 05:28) (109/58 - 125/58)  RR: 16 (14 Feb 2020 05:28) (16 - 16)      PHYSICAL EXAM:      Constitutional: A&Ox4  Respiratory: cta b/l  Cardiovascular: s1 s2 rrr  Gastrointestinal: soft mild LLQ tend, improving  nd + bs no rebound or guarding  Genitourinary: no cva tenderness  Extremities: normal rom, no edema, calf tenderness                                  11.3   10.09 )-----------( 653      ( 13 Feb 2020 08:18 )             34.0       02-13    143  |  105  |  23<H>  ----------------------------<  104<H>  4.3   |  26  |  0.6<L>    Ca    8.9      13 Feb 2020 08:18  Phos  3.4     02-13  Mg     2.1     02-13    TPro  6.0  /  Alb  3.2<L>  /  TBili  <0.2  /  DBili  x   /  AST  28  /  ALT  27  /  AlkPhos  170<H>  02-13          Magnesium, Serum: 2.1 mg/dL (02-13-20 @ 08:18)  Phosphorus Level, Serum: 3.4 mg/dL (02-13-20 @ 08:18

## 2020-02-15 ENCOUNTER — TRANSCRIPTION ENCOUNTER (OUTPATIENT)
Age: 65
End: 2020-02-15

## 2020-02-15 VITALS
HEART RATE: 68 BPM | RESPIRATION RATE: 16 BRPM | SYSTOLIC BLOOD PRESSURE: 106 MMHG | DIASTOLIC BLOOD PRESSURE: 55 MMHG | TEMPERATURE: 98 F

## 2020-02-15 LAB
ANION GAP SERPL CALC-SCNC: 13 MMOL/L — SIGNIFICANT CHANGE UP (ref 7–14)
BUN SERPL-MCNC: 18 MG/DL — SIGNIFICANT CHANGE UP (ref 10–20)
CALCIUM SERPL-MCNC: 9 MG/DL — SIGNIFICANT CHANGE UP (ref 8.5–10.1)
CHLORIDE SERPL-SCNC: 107 MMOL/L — SIGNIFICANT CHANGE UP (ref 98–110)
CO2 SERPL-SCNC: 23 MMOL/L — SIGNIFICANT CHANGE UP (ref 17–32)
CREAT SERPL-MCNC: 0.6 MG/DL — LOW (ref 0.7–1.5)
GLUCOSE BLDC GLUCOMTR-MCNC: 120 MG/DL — HIGH (ref 70–99)
GLUCOSE SERPL-MCNC: 86 MG/DL — SIGNIFICANT CHANGE UP (ref 70–99)
MAGNESIUM SERPL-MCNC: 2.1 MG/DL — SIGNIFICANT CHANGE UP (ref 1.8–2.4)
PHOSPHATE SERPL-MCNC: 3.8 MG/DL — SIGNIFICANT CHANGE UP (ref 2.1–4.9)
POTASSIUM SERPL-MCNC: 4.8 MMOL/L — SIGNIFICANT CHANGE UP (ref 3.5–5)
POTASSIUM SERPL-SCNC: 4.8 MMOL/L — SIGNIFICANT CHANGE UP (ref 3.5–5)
SODIUM SERPL-SCNC: 143 MMOL/L — SIGNIFICANT CHANGE UP (ref 135–146)

## 2020-02-15 PROCEDURE — 99238 HOSP IP/OBS DSCHRG MGMT 30/<: CPT

## 2020-02-15 PROCEDURE — 99232 SBSQ HOSP IP/OBS MODERATE 35: CPT

## 2020-02-15 RX ORDER — CIPROFLOXACIN LACTATE 400MG/40ML
1 VIAL (ML) INTRAVENOUS
Qty: 10 | Refills: 0
Start: 2020-02-15 | End: 2020-02-19

## 2020-02-15 RX ORDER — METRONIDAZOLE 500 MG
1 TABLET ORAL
Qty: 15 | Refills: 0
Start: 2020-02-15 | End: 2020-02-19

## 2020-02-15 RX ADMIN — MEROPENEM 100 MILLIGRAM(S): 1 INJECTION INTRAVENOUS at 05:14

## 2020-02-15 RX ADMIN — Medication 25 MICROGRAM(S): at 05:14

## 2020-02-15 NOTE — PROGRESS NOTE ADULT - NSHPATTENDINGPLANDISCUSS_GEN_ALL_CORE
above
Pt/resident/Dr. Cole.
above
Patient.
Patient.
pt
Patient
patient and surgical PA
Pt., resident, Dr. Cole.
Patient
patient and surgery team

## 2020-02-15 NOTE — PROGRESS NOTE ADULT - SUBJECTIVE AND OBJECTIVE BOX
Pt feels better, denies much abd pain n/v, f/c. Pt wants to eat   TOLERATED FULL LIQ   refused PPN  ambulates +    As per CT Scan done while in IR in prone position the fluid collection has resolved and is now very minimal, therefore drainage is not indicated at this time.        PAST MEDICAL & SURGICAL HISTORY:  Diverticulitis  Hypothyroidism  H/O tubal ligation      Allergies    No Known Allergies      MEDICATIONS  (STANDING):  chlorhexidine 4% Liquid 1 Application(s) Topical <User Schedule>  fat emulsion (Plant Based) 20% Infusion 1.85 Gm/kG/Day (31.334 mL/Hr) IV Continuous <Continuous>  heparin  Injectable 5000 Unit(s) SubCutaneous every 12 hours  levothyroxine 25 MICROGram(s) Oral daily  meropenem  IVPB 1000 milliGRAM(s) IV Intermittent every 8 hours  Parenteral Nutrition - Adult 1 Each (65 mL/Hr) TPN Continuous <Continuous>    MEDICATIONS  (PRN):  acetaminophen   Tablet .. 650 milliGRAM(s) Oral every 6 hours PRN Temp greater or equal to 38C (100.4F)  aluminum hydroxide/magnesium hydroxide/simethicone Suspension 30 milliLiter(s) Oral every 6 hours PRN Dyspepsia  morphine  - Injectable 2 milliGRAM(s) IV Push every 3 hours PRN Severe Pain (7 - 10)      ROS:  General:	no fever, weight loss,  chills  Respiratory and Thorax: no cough, wheeze,  sob  Cardiovascular:	no chest pain, palpitations, dizziness  Gastrointestinal:	no nausea, vomiting, diarrhea, mild abd pain  Genitourinary:	no dysuria, hematuria    Vital Signs Last 24 Hrs  T(C): 36.4 (15 Feb 2020 05:35), Max: 36.4 (15 Feb 2020 05:35)  T(F): 97.5 (15 Feb 2020 05:35), Max: 97.5 (15 Feb 2020 05:35)  HR: 68 (15 Feb 2020 05:35) (68 - 89)  BP: 106/55 (15 Feb 2020 05:35) (106/55 - 126/58)  BP(mean): --  RR: 16 (15 Feb 2020 05:35) (16 - 16)  SpO2: --      PHYSICAL EXAM:      Constitutional: A&Ox4  Respiratory: cta b/l  Cardiovascular: s1 s2 rrr  Gastrointestinal: soft mild LLQ tend, improving  nd + bs no rebound or guarding  Genitourinary: no cva tenderness  Extremities: normal rom, no edema, calf tenderness                                  11.3   10.09 )-----------( 653      ( 13 Feb 2020 08:18 )             34.0       02-13    143  |  105  |  23<H>  ----------------------------<  104<H>  4.3   |  26  |  0.6<L>    Ca    8.9      13 Feb 2020 08:18  Phos  3.4     02-13  Mg     2.1     02-13    TPro  6.0  /  Alb  3.2<L>  /  TBili  <0.2  /  DBili  x   /  AST  28  /  ALT  27  /  AlkPhos  170<H>  02-13          Magnesium, Serum: 2.1 mg/dL (02-13-20 @ 08:18)  Phosphorus Level, Serum: 3.4 mg/dL (02-13-20 @ 08:18 Pt feels better, denies much abd pain n/v, f/c. Pt wants to eat   TOLERATED FULL LIQ   refused PPN  ambulates +    As per CT Scan done while in IR in prone position the fluid collection has resolved and is now very minimal, therefore drainage is not indicated at this time.        PAST MEDICAL & SURGICAL HISTORY:  Diverticulitis  Hypothyroidism  H/O tubal ligation      Allergies    No Known Allergies      MEDICATIONS  (STANDING):  chlorhexidine 4% Liquid 1 Application(s) Topical <User Schedule>  fat emulsion (Plant Based) 20% Infusion 1.85 Gm/kG/Day (31.334 mL/Hr) IV Continuous <Continuous>  heparin  Injectable 5000 Unit(s) SubCutaneous every 12 hours  levothyroxine 25 MICROGram(s) Oral daily  meropenem  IVPB 1000 milliGRAM(s) IV Intermittent every 8 hours  Parenteral Nutrition - Adult 1 Each (65 mL/Hr) TPN Continuous <Continuous>    MEDICATIONS  (PRN):  acetaminophen   Tablet .. 650 milliGRAM(s) Oral every 6 hours PRN Temp greater or equal to 38C (100.4F)  aluminum hydroxide/magnesium hydroxide/simethicone Suspension 30 milliLiter(s) Oral every 6 hours PRN Dyspepsia  morphine  - Injectable 2 milliGRAM(s) IV Push every 3 hours PRN Severe Pain (7 - 10)      ROS:  General:	no fever, weight loss,  chills  Respiratory and Thorax: no cough, wheeze,  sob  Cardiovascular:	no chest pain, palpitations, dizziness  Gastrointestinal:	no nausea, vomiting, diarrhea, mild abd pain  Genitourinary:	no dysuria, hematuria    Vital Signs Last 24 Hrs  T(C): 36.4 (15 Feb 2020 05:35), Max: 36.4 (15 Feb 2020 05:35)  T(F): 97.5 (15 Feb 2020 05:35), Max: 97.5 (15 Feb 2020 05:35)  HR: 68 (15 Feb 2020 05:35) (68 - 89)  BP: 106/55 (15 Feb 2020 05:35) (106/55 - 126/58)  RR: 16 (15 Feb 2020 05:35) (16 - 16)        PHYSICAL EXAM:      Constitutional: A&Ox4  Respiratory: cta b/l  Cardiovascular: s1 s2 rrr  Gastrointestinal: soft mild LLQ tend, improving  nd + bs no rebound or guarding  Genitourinary: no cva tenderness  Extremities: normal rom, no edema, calf tenderness                                  11.3   10.09 )-----------( 653      ( 13 Feb 2020 08:18 )             34.0       02-13    143  |  105  |  23<H>  ----------------------------<  104<H>  4.3   |  26  |  0.6<L>    Ca    8.9      13 Feb 2020 08:18  Phos  3.4     02-13  Mg     2.1     02-13    TPro  6.0  /  Alb  3.2<L>  /  TBili  <0.2  /  DBili  x   /  AST  28  /  ALT  27  /  AlkPhos  170<H>  02-13          Magnesium, Serum: 2.1 mg/dL (02-13-20 @ 08:18)  Phosphorus Level, Serum: 3.4 mg/dL (02-13-20 @ 08:18

## 2020-02-15 NOTE — PROGRESS NOTE ADULT - SUBJECTIVE AND OBJECTIVE BOX
KIM WEISS  65y, Female  Allergy: No Known Allergies    Hospital Day: 17d    Patient seen and examined earlier today. No acute events overnight.    PMH/PSH:  PAST MEDICAL & SURGICAL HISTORY:  Diverticulitis  Hypothyroidism  H/O tubal ligation    VITALS:  T(F): 97.5 (02-15-20 @ 05:35), Max: 97.5 (02-15-20 @ 05:35)  HR: 68 (02-15-20 @ 05:35)  BP: 106/55 (02-15-20 @ 05:35) (106/55 - 126/58)  RR: 16 (02-15-20 @ 05:35)  SpO2: --    TESTS & MEASUREMENTS:  Weight (Kg):       02-13-20 @ 07:01  -  02-14-20 @ 07:00  --------------------------------------------------------  IN: 780 mL / OUT: 0 mL / NET: 780 mL                          11.6   11.32 )-----------( 598      ( 14 Feb 2020 06:12 )             33.6     02-14    140  |  108  |  20  ----------------------------<  108<H>  4.2   |  23  |  <0.5<L>    Ca    8.8      14 Feb 2020 06:12  Phos  3.4     02-14  Mg     2.1     02-14    Culture - Blood (collected 02-11-20 @ 08:00)  Source: .Blood None  Preliminary Report (02-12-20 @ 23:01):    No growth to date.    RECENT DIAGNOSTIC ORDERS:  Basic Metabolic Panel: AM Sched. Collection: 15-Feb-2020 04:30 (02-14-20 @ 10:55)  Magnesium, Serum: AM Sched. Collection: 15-Feb-2020 04:30 (02-14-20 @ 10:55)  Phosphorus Level, Serum: AM Sched. Collection: 15-Feb-2020 04:30 (02-14-20 @ 10:55)  Diet, Regular:   Fiber/Residue Restricted (02-15-20 @ 07:29)    MEDICATIONS:  MEDICATIONS  (STANDING):  chlorhexidine 4% Liquid 1 Application(s) Topical <User Schedule>  heparin  Injectable 5000 Unit(s) SubCutaneous every 12 hours  levothyroxine 25 MICROGram(s) Oral daily  meropenem  IVPB 1000 milliGRAM(s) IV Intermittent every 8 hours    MEDICATIONS  (PRN):  acetaminophen   Tablet .. 650 milliGRAM(s) Oral every 6 hours PRN Temp greater or equal to 38C (100.4F)  aluminum hydroxide/magnesium hydroxide/simethicone Suspension 30 milliLiter(s) Oral every 6 hours PRN Dyspepsia  morphine  - Injectable 2 milliGRAM(s) IV Push every 3 hours PRN Severe Pain (7 - 10)    HOME MEDICATIONS:  levothyroxine 25 mcg (0.025 mg) oral tablet (02-11)  Lypholyte II/Nutrilyte II/TPN Electrolytes intravenous solution (02-11)  meropenem 1000 mg intravenous injection (02-11)    REVIEW OF SYSTEMS:  All other review of systems is negative unless indicated above.     PHYSICAL EXAM:  GENERAL: NAD  HEENT: No Swelling  CHEST/LUNG: Good air entry, No wheezing  HEART: RRR, No murmurs  ABDOMEN: Soft, Bowel sounds present  EXTREMITIES:  No clubbing

## 2020-02-15 NOTE — PROGRESS NOTE ADULT - ATTENDING COMMENTS
epurow
Patient seen and examined independently of PA. My addendum supersedes PA notation. Case discussed with housestaff, nursing and patient
Patient seen and examined independently of PA. My addendum supersedes PA notation. Case discussed with housestaff, nursing and patient and GI and surgical teams
Patient seen and examined independently of PA. My addendum supersedes PA notation. Case discussed with housestaff, nursing and patient and nutrition and IR teams
REINA Aranda
patient/family refusing procedure  reconsult IR Prn
As noted above,    No events overnight, no new complaints.  Denies increasing pain, nausea or vomiting.    Afeb (tm 101), vss    Abd soft, no guarding or rebound, mild suprapubic tenderness    WBC pending    Imp: non resolving diverticulitis.  Plan: repeat CT scan in wbc is not significantly improved.  If no significant clinical improvement over the next 44-72 hours will likely proceed to surgery.
As noted above,    Pt seen and evaluated noted to have increase abdominal discomfort today,   denies nausea, vomiting.    afeb (tm 100.9)    abd soft, mild suprapubic discomfort, no guarding or rebound.    WBC 19    Imp: Clinically worse over the last 12 hours.  Plan: Hold off on plans to d/c home today.          continued observation.          Will proceed with surgical intervention this week if no improvement
Patient seen and examined independently of PA. My addendum supersedes PA notation. Case discussed with housestaff, nursing and patient GI and nutritionist
Pt seen and examined with house staff @ 1030 hrs.  alert and stable, afebrile, no pain at rest, ambulates well.    Abd soft and flat, minimal LLQ tenderness with no peritoneal signs.  WBC down to 14K.  Repeat CT noted and re-eval by IR pending.  Case d/w Dr. Cole, will cont IV axbx and nutrition, advance diet slowly.
Patient seen and examined with surgery team on rounds and discussed management plans. patient comfortable sitting in chair dressed. + BM Tolerating diet. abd benign. Discussed with patient for outpatient fu with Dr. Cole. Patient can be discharged today on IV antibiotics Ertapenem . PICC line in place.
Pt seen and examined with house staff @ bedside, 0945 hrs.  Looks well overall, some mild LLQ pain @ rest, tolerates po, no n/v.  Abd soft and flat, no palpable masses, mildly tender low in LLQ.  Labs noted, repeat CT images reviewed with report.  Case d/w Dr. Cole.    Abscess now appears more amenable to percutaneous drainage, which will likely quicken her recovery the acute exacerbation, to be scheduled via IR.  Further Rx decisions after that.
as noted above    Looks and feels improved    afeb, vss    abd, mild low abd discomfort    wbc 14    Imp: diverticulitis with slow gradual improvement.  rec: adv diet as tolerated        d/c planning for outpt abx         f/u with me in the office.
As noted above,    feels a little better.    afeb, vss    abd soft, suprapubic tenderness, no guarding or rebound    wbc 12    Imp: resolving diverticulitis.    Rec: agree with plan, ie...    advance to clear liquid diet  Continue to monitor CBC/lytes  IV abx  CT Scan Abdomen/Pelvis with PO/IV Contrast  Encourage ambulation  DVT/GI Prophylaxis

## 2020-02-15 NOTE — PROGRESS NOTE ADULT - PROVIDER SPECIALTY LIST ADULT
Gastroenterology
Hospitalist
Infectious Disease
Internal Medicine
Intervent Radiology
Nutrition Support
Surgery
Hospitalist

## 2020-02-15 NOTE — DISCHARGE NOTE NURSING/CASE MANAGEMENT/SOCIAL WORK - PATIENT PORTAL LINK FT
You can access the FollowMyHealth Patient Portal offered by [facility] by registering at the following website: [email or web address]. By joining [employer]’s FollowMyHealth portal, you will also be able to view your health information using other applications (apps) compatible with our system.

## 2020-02-15 NOTE — PROGRESS NOTE ADULT - REASON FOR ADMISSION
Diverticulitis

## 2020-02-15 NOTE — PROGRESS NOTE ADULT - ASSESSMENT
64F PMHx hypothyroidism, recurrent diverticulitis (1/2020) here with abd pain, found to have diverticulitis with fluid collection.    1. Acute diverticulitis, c/b abscess: Repeat CT 02/09 showing increase in size of perirectal abscess. Transfer to [facility] denied by insurance. PT went for IR guided aspiration 02/13, but procedure cancelled as abscess had resolved. Diet advanced to solid today, if tolerating, will discharge patient off antibiotics. Today is day 18 of IV antibiotics. She has been on ertapenem/meropenem since admission on Jan 29, 2020  2. Hypothyroidism: Cont synthroid    DVT/GI PPX      #Progress Note Handoff  Pending (specify): Advancing diet, possible DC today  Family discussion: d/w pt regarding possible discharge today  Disposition: Home 64F PMHx hypothyroidism, recurrent diverticulitis (1/2020) here with abd pain, found to have diverticulitis with fluid collection.    1. Acute diverticulitis, c/b abscess: Repeat CT 02/09 showing increase in size of perirectal abscess. Transfer to [facility] denied by insurance. PT went for IR guided aspiration 02/13, but procedure cancelled as abscess had resolved. Diet advanced to solid today, if tolerating, will discharge with 5 more days of cipro/flagyl.   2. Hypothyroidism: Cont synthroid    DVT/GI PPX      #Progress Note Handoff  Pending (specify): Advancing diet, possible DC today  Family discussion: d/w pt regarding possible discharge today  Disposition: Home

## 2020-02-15 NOTE — PROGRESS NOTE ADULT - ASSESSMENT
Pt is a 66yo with perforated diverticulitis with abscess.   As per IR , abscess resolving and no drainage needed     - advance to reg   - PPN taper  - abx upon d/c as per ID   - F/U CBC   - NO FURTHER SURGERY INTERVENTION , CALL AS NEEDED      All above d/w surgical team

## 2020-02-16 LAB
CULTURE RESULTS: SIGNIFICANT CHANGE UP
SPECIMEN SOURCE: SIGNIFICANT CHANGE UP

## 2020-02-19 NOTE — CDI QUERY NOTE - NSCDIOTHERTXTBX_GEN_ALL_CORE_HH
Pt admitted with diverticulitis with abscess  Sepsis documented in the ID note of 2/10/20  Temp on admission 98.2, hr. 115 rr 18 wbc21.09   1/30: temp 98.3, hr. 89 rr 16  wbc 15.02  1/31: temp 101.6, hr. 77 rr 18wbc 14.01  2/1: t98.7 hr. 72 rr16 wbc 12.58  2/6: t 100.9 hr. 94 rr16 wbc 14.09  2/10: temp 101.6 hr. 120 rr 16 wbc 20 .88    Clinical indicators:Pt was treated with IV Ertapenum which was d’cd on2/7/20                                  Pt was then treated with IV Meropenem                                  Pt was discharged on Ciprofloxin and Metronidazole                                  Ct scan + for diverticulitis w abscess.    Please determine: Do you agree with sepsis, if so was sepsis poa?                                  Sepsis ruled out?                                  Other( please specify)                                  Unable to determine

## 2020-02-24 DIAGNOSIS — R10.32 LEFT LOWER QUADRANT PAIN: ICD-10-CM

## 2020-02-24 DIAGNOSIS — K57.20 DIVERTICULITIS OF LARGE INTESTINE WITH PERFORATION AND ABSCESS WITHOUT BLEEDING: ICD-10-CM

## 2020-02-24 DIAGNOSIS — K59.00 CONSTIPATION, UNSPECIFIED: ICD-10-CM
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
## 2020-02-24 DIAGNOSIS — A41.9 SEPSIS, UNSPECIFIED ORGANISM: ICD-10-CM

## 2020-02-24 DIAGNOSIS — E03.9 HYPOTHYROIDISM, UNSPECIFIED: ICD-10-CM

## 2020-02-24 DIAGNOSIS — T80.818A EXTRAVASATION OF OTHER VESICANT AGENT, INITIAL ENCOUNTER: ICD-10-CM

## 2020-03-02 ENCOUNTER — APPOINTMENT (OUTPATIENT)
Dept: GASTROENTEROLOGY | Facility: CLINIC | Age: 65
End: 2020-03-02

## 2020-04-20 NOTE — PATIENT PROFILE ADULT - ANY SIGNIFICANT CHANGE IN ABILITY TO PERFORM THE FOLLOWING ADL SINCE THE ONSET OF PRESENT ILLNESS?
You can access the FollowMyHealth Patient Portal offered by [facility] by registering at the following website: [email or web address]. By joining [employer]’s FollowMyHealth portal, you will also be able to view your health information using other applications (apps) compatible with our system. no

## 2023-01-20 NOTE — CONSULT NOTE ADULT - CONSULT REQUESTED DATE/TIME
02-Jan-2020 12:49
30-Dec-2019 07:52
30-Dec-2019 13:16
31-Dec-2019 10:00
Include Location In Plan?: No
Detail Level: Zone

## 2023-05-21 NOTE — PROGRESS NOTE ADULT - ASSESSMENT
64F PMHx hypothyroidism, recurrent diverticulitis (1/2020) here with abd pain, found to have diverticulitis with fluid collection.    1. Acute diverticulitis, c/b abscess: Repeat CT 02/09 showing increase in size of perirectal abscess. Transfer to [facility] denied by insurance. Pt going for IR guided aspiration today. Continue meropenem   2. Hypothyroidism: Cont synthroid    DVT/GI PPX      #Progress Note Handoff  Pending (specify): IR guided drainage  Family discussion: d/w pt regarding IR drainage  Disposition: TBD S1-S2

## 2024-05-02 NOTE — PATIENT PROFILE ADULT - FALLEN IN THE PAST
Patient presents with:  Cerumen Impaction: Patient c/o ? Cerumen impaction- left ear since last week. Medication Refill: Patient request Rx refill: Temovate 0.5% cream      Respiratory:  Negative for apnea, cough, shortness of breath and wheezing. Cardiovascular:  Negative for chest pain and palpitations. Gastrointestinal:  Negative for abdominal pain. Genitourinary:  Negative for dysuria, frequency and urgency. Neurological:  Negative for dizziness, syncope, numbness and headaches. Hematological:  Negative for adenopathy. Objective   Physical Exam  Vitals and nursing note reviewed. Constitutional:       Appearance: Normal appearance. HENT:      Head: Normocephalic and atraumatic. Cardiovascular:      Rate and Rhythm: Normal rate and regular rhythm. Pulmonary:      Effort: No respiratory distress. Breath sounds: No wheezing. Abdominal:      General: There is no distension. Tenderness: There is no abdominal tenderness. There is no rebound. Skin:     Coloration: Skin is not jaundiced. Findings: No rash. Neurological:      General: No focal deficit present. Mental Status: She is alert and oriented to person, place, and time. Cranial Nerves: No cranial nerve deficit. Motor: No weakness.                 An electronic signature was used to authenticate this note.    --Raul Poole,  no <-- Click to add NO pertinent Family History

## 2024-06-14 NOTE — ED ADULT TRIAGE NOTE - BSA (M2)
[facility] Cardiology Progress Note  Dr. Monique Bartlett      Referring Physician: Feliciano Dominguez DO  CHIEF COMPLAINT: CAD, atrial fibrillation  Chief Complaint   Patient presents with    Coronary Artery Disease     Annual....pt has no c/c       HISTORY OF PRESENT ILLNESS:   Patient is 64 years old female with CAD, s/p PCI to OM, paroxysmal atrial fibrillation, is here for follow up visit.  Patient denies any chest pain, no shortness of breath, no lightheadedness, no dizziness, no palpitations, no pedal edema, no PND, no orthopnea, no syncope, no presyncopal episodes.  Functional capacity is at baseline    Past Medical History:   Diagnosis Date    Arthritis     CAD (coronary artery disease)     9-17-20 yisel 2.0x22 francisca om.     COVID-19 virus infection 07/2020    Depression     GERD (gastroesophageal reflux disease)     Graves disease     Hypertension     States she has never had HTN    Hypothyroidism     SECONDARY TO GRAVES DISEASE    NSTEMI (non-ST elevated myocardial infarction) ([facility]) 09/16/2020    Obesity     S/P total knee arthroplasty, left 11/07/2022         Past Surgical History:   Procedure Laterality Date    CARDIAC CATHETERIZATION  09/17/2020    stent x 1    CHOLECYSTECTOMY, LAPAROSCOPIC N/A 09/05/2023    LAPAROSCOPIC ROBOTIC XI ASSISTED CHOLECYSTECTOMY with ioc performed by Kimberly Eden MD at [facility] OR    COLONOSCOPY  11/10/2010    DR GUILLERMO    CORONARY ANGIOPLASTY WITH STENT PLACEMENT  09/17/2020    [facility]     ERCP N/A 09/04/2023    ERCP ENDOSCOPIC RETROGRADE CHOLANGIOPANCREATOGRAPHY ABORTED performed by Gentry Phillips MD at [facility] OR    ERCP N/A 09/07/2023    LAPAROSCOPIC ASSISTED ERCP ENDOSCOPIC RETROGRADE CHOLANGIOPANCREATOGRAPHY performed by Miri Escalera MD at [facility] OR    GASTRIC BYPASS SURGERY  05/02/2006    DR HAIRSTON    TOTAL KNEE ARTHROPLASTY Left 11/07/2022    LEFT KNEE TOTAL ARTHROPLASTY performed by Nik Trivedi [place] [facility] [place]    TOTAL KNEE ARTHROPLASTY Right 
1.54

## 2025-04-25 NOTE — DISCHARGE NOTE PROVIDER - NSDCHHATTENDCERT_GEN_ALL_CORE
Statement Selected My signature below certifies that the above stated patient is homebound and upon completion of the Face-To-Face encounter, has the need for intermittent skilled nursing, physical therapy and/or speech or occupational therapy services in their home for their current diagnosis as outlined in their initial plan of care. These services will continue to be monitored by myself or another physician.